# Patient Record
Sex: FEMALE | Race: WHITE | NOT HISPANIC OR LATINO | Employment: OTHER | URBAN - METROPOLITAN AREA
[De-identification: names, ages, dates, MRNs, and addresses within clinical notes are randomized per-mention and may not be internally consistent; named-entity substitution may affect disease eponyms.]

---

## 2022-01-01 ENCOUNTER — APPOINTMENT (OUTPATIENT)
Dept: CARDIOLOGY | Facility: CLINIC | Age: 87
DRG: 853 | End: 2022-01-01
Attending: SPECIALIST
Payer: MEDICARE

## 2022-01-01 ENCOUNTER — APPOINTMENT (OUTPATIENT)
Dept: GENERAL RADIOLOGY | Facility: CLINIC | Age: 87
DRG: 853 | End: 2022-01-01
Attending: EMERGENCY MEDICINE
Payer: MEDICARE

## 2022-01-01 ENCOUNTER — ANESTHESIA (OUTPATIENT)
Dept: SURGERY | Facility: CLINIC | Age: 87
DRG: 853 | End: 2022-01-01
Payer: MEDICARE

## 2022-01-01 ENCOUNTER — HOSPITAL ENCOUNTER (INPATIENT)
Facility: CLINIC | Age: 87
LOS: 14 days | DRG: 853 | End: 2022-05-08
Attending: EMERGENCY MEDICINE | Admitting: HOSPITALIST
Payer: MEDICARE

## 2022-01-01 ENCOUNTER — APPOINTMENT (OUTPATIENT)
Dept: GENERAL RADIOLOGY | Facility: CLINIC | Age: 87
DRG: 853 | End: 2022-01-01
Attending: NURSE PRACTITIONER
Payer: MEDICARE

## 2022-01-01 ENCOUNTER — ANESTHESIA EVENT (OUTPATIENT)
Dept: SURGERY | Facility: CLINIC | Age: 87
DRG: 853 | End: 2022-01-01
Payer: MEDICARE

## 2022-01-01 ENCOUNTER — APPOINTMENT (OUTPATIENT)
Dept: OCCUPATIONAL THERAPY | Facility: CLINIC | Age: 87
DRG: 853 | End: 2022-01-01
Payer: MEDICARE

## 2022-01-01 ENCOUNTER — APPOINTMENT (OUTPATIENT)
Dept: CT IMAGING | Facility: CLINIC | Age: 87
DRG: 853 | End: 2022-01-01
Attending: NURSE PRACTITIONER
Payer: MEDICARE

## 2022-01-01 ENCOUNTER — PREP FOR PROCEDURE (OUTPATIENT)
Dept: OTHER | Facility: CLINIC | Age: 87
End: 2022-01-01
Payer: MEDICARE

## 2022-01-01 ENCOUNTER — HOSPITAL ENCOUNTER (EMERGENCY)
Facility: CLINIC | Age: 87
Discharge: HOME | DRG: 853 | End: 2022-04-22
Attending: EMERGENCY MEDICINE | Admitting: EMERGENCY MEDICINE
Payer: MEDICARE

## 2022-01-01 ENCOUNTER — APPOINTMENT (OUTPATIENT)
Dept: CARDIOLOGY | Facility: CLINIC | Age: 87
DRG: 853 | End: 2022-01-01
Attending: PEDIATRICS
Payer: MEDICARE

## 2022-01-01 ENCOUNTER — APPOINTMENT (OUTPATIENT)
Dept: GENERAL RADIOLOGY | Facility: CLINIC | Age: 87
DRG: 853 | End: 2022-01-01
Attending: SPECIALIST
Payer: MEDICARE

## 2022-01-01 ENCOUNTER — APPOINTMENT (OUTPATIENT)
Dept: OCCUPATIONAL THERAPY | Facility: CLINIC | Age: 87
DRG: 853 | End: 2022-01-01
Attending: PHYSICIAN ASSISTANT
Payer: MEDICARE

## 2022-01-01 ENCOUNTER — APPOINTMENT (OUTPATIENT)
Dept: CARDIOLOGY | Facility: CLINIC | Age: 87
DRG: 853 | End: 2022-01-01
Attending: HOSPITALIST
Payer: MEDICARE

## 2022-01-01 ENCOUNTER — APPOINTMENT (OUTPATIENT)
Dept: CT IMAGING | Facility: CLINIC | Age: 87
DRG: 853 | End: 2022-01-01
Attending: EMERGENCY MEDICINE
Payer: MEDICARE

## 2022-01-01 ENCOUNTER — APPOINTMENT (OUTPATIENT)
Dept: ULTRASOUND IMAGING | Facility: CLINIC | Age: 87
DRG: 853 | End: 2022-01-01
Attending: HOSPITALIST
Payer: MEDICARE

## 2022-01-01 ENCOUNTER — PREP FOR PROCEDURE (OUTPATIENT)
Dept: OTHER | Facility: CLINIC | Age: 87
End: 2022-01-01

## 2022-01-01 VITALS
SYSTOLIC BLOOD PRESSURE: 119 MMHG | BODY MASS INDEX: 22.47 KG/M2 | DIASTOLIC BLOOD PRESSURE: 70 MMHG | HEART RATE: 100 BPM | WEIGHT: 143.2 LBS | RESPIRATION RATE: 24 BRPM | HEIGHT: 67 IN | TEMPERATURE: 98.5 F | OXYGEN SATURATION: 95 %

## 2022-01-01 VITALS
WEIGHT: 128 LBS | RESPIRATION RATE: 18 BRPM | DIASTOLIC BLOOD PRESSURE: 74 MMHG | HEIGHT: 61 IN | BODY MASS INDEX: 24.17 KG/M2 | SYSTOLIC BLOOD PRESSURE: 133 MMHG | OXYGEN SATURATION: 95 % | TEMPERATURE: 98.2 F | HEART RATE: 77 BPM

## 2022-01-01 DIAGNOSIS — N28.9 RENAL LESION: ICD-10-CM

## 2022-01-01 DIAGNOSIS — J98.11 ATELECTASIS: ICD-10-CM

## 2022-01-01 DIAGNOSIS — M00.9 JOINT INFECTION (H): Primary | ICD-10-CM

## 2022-01-01 DIAGNOSIS — M25.511 ACUTE PAIN OF RIGHT SHOULDER: ICD-10-CM

## 2022-01-01 DIAGNOSIS — M00.9 PYOGENIC ARTHRITIS OF RIGHT SHOULDER REGION, DUE TO UNSPECIFIED ORGANISM (H): Primary | ICD-10-CM

## 2022-01-01 DIAGNOSIS — M00.9 PYOGENIC ARTHRITIS OF RIGHT SHOULDER REGION, DUE TO UNSPECIFIED ORGANISM (H): ICD-10-CM

## 2022-01-01 LAB
ALBUMIN SERPL-MCNC: 1.4 G/DL (ref 3.4–5)
ALBUMIN SERPL-MCNC: 1.9 G/DL (ref 3.4–5)
ALBUMIN SERPL-MCNC: 2.2 G/DL (ref 3.4–5)
ALBUMIN UR-MCNC: 10 MG/DL
ALP SERPL-CCNC: 125 U/L (ref 40–150)
ALP SERPL-CCNC: 67 U/L (ref 40–150)
ALP SERPL-CCNC: 88 U/L (ref 40–150)
ALT SERPL W P-5'-P-CCNC: 30 U/L (ref 0–50)
ALT SERPL W P-5'-P-CCNC: 33 U/L (ref 0–50)
ALT SERPL W P-5'-P-CCNC: 38 U/L (ref 0–50)
ANION GAP SERPL CALCULATED.3IONS-SCNC: 10 MMOL/L (ref 3–14)
ANION GAP SERPL CALCULATED.3IONS-SCNC: 2 MMOL/L (ref 3–14)
ANION GAP SERPL CALCULATED.3IONS-SCNC: 4 MMOL/L (ref 3–14)
ANION GAP SERPL CALCULATED.3IONS-SCNC: 4 MMOL/L (ref 3–14)
ANION GAP SERPL CALCULATED.3IONS-SCNC: 5 MMOL/L (ref 3–14)
ANION GAP SERPL CALCULATED.3IONS-SCNC: 5 MMOL/L (ref 3–14)
ANION GAP SERPL CALCULATED.3IONS-SCNC: 6 MMOL/L (ref 3–14)
ANION GAP SERPL CALCULATED.3IONS-SCNC: 8 MMOL/L (ref 3–14)
APPEARANCE FLD: ABNORMAL
APPEARANCE UR: CLEAR
AST SERPL W P-5'-P-CCNC: 26 U/L (ref 0–45)
AST SERPL W P-5'-P-CCNC: 37 U/L (ref 0–45)
AST SERPL W P-5'-P-CCNC: 39 U/L (ref 0–45)
ATRIAL RATE - MUSE: 88 BPM
BACTERIA BLD CULT: ABNORMAL
BACTERIA BLD CULT: NO GROWTH
BACTERIA SNV CULT: ABNORMAL
BACTERIA SNV CULT: ABNORMAL
BACTERIA UR CULT: NO GROWTH
BASOPHILS # BLD AUTO: 0.1 10E3/UL (ref 0–0.2)
BASOPHILS # BLD MANUAL: 0 10E3/UL (ref 0–0.2)
BASOPHILS NFR BLD AUTO: 0 %
BASOPHILS NFR BLD MANUAL: 0 %
BILIRUB SERPL-MCNC: 0.2 MG/DL (ref 0.2–1.3)
BILIRUB SERPL-MCNC: 0.5 MG/DL (ref 0.2–1.3)
BILIRUB SERPL-MCNC: 0.6 MG/DL (ref 0.2–1.3)
BILIRUB UR QL STRIP: NEGATIVE
BUN SERPL-MCNC: 10 MG/DL (ref 7–30)
BUN SERPL-MCNC: 11 MG/DL (ref 7–30)
BUN SERPL-MCNC: 11 MG/DL (ref 7–30)
BUN SERPL-MCNC: 12 MG/DL (ref 7–30)
BUN SERPL-MCNC: 14 MG/DL (ref 7–30)
BUN SERPL-MCNC: 14 MG/DL (ref 7–30)
BUN SERPL-MCNC: 15 MG/DL (ref 7–30)
BUN SERPL-MCNC: 18 MG/DL (ref 7–30)
CALCIUM SERPL-MCNC: 6.8 MG/DL (ref 8.5–10.1)
CALCIUM SERPL-MCNC: 6.9 MG/DL (ref 8.5–10.1)
CALCIUM SERPL-MCNC: 7.7 MG/DL (ref 8.5–10.1)
CALCIUM SERPL-MCNC: 8 MG/DL (ref 8.5–10.1)
CALCIUM SERPL-MCNC: 8.1 MG/DL (ref 8.5–10.1)
CALCIUM SERPL-MCNC: 8.2 MG/DL (ref 8.5–10.1)
CELL COUNT BODY FLUID SOURCE: ABNORMAL
CHLORIDE BLD-SCNC: 105 MMOL/L (ref 94–109)
CHLORIDE BLD-SCNC: 107 MMOL/L (ref 94–109)
CHLORIDE BLD-SCNC: 108 MMOL/L (ref 94–109)
CHLORIDE BLD-SCNC: 108 MMOL/L (ref 94–109)
CHLORIDE BLD-SCNC: 109 MMOL/L (ref 94–109)
CHLORIDE BLD-SCNC: 109 MMOL/L (ref 94–109)
CHLORIDE BLD-SCNC: 110 MMOL/L (ref 94–109)
CHLORIDE BLD-SCNC: 111 MMOL/L (ref 94–109)
CO2 SERPL-SCNC: 20 MMOL/L (ref 20–32)
CO2 SERPL-SCNC: 20 MMOL/L (ref 20–32)
CO2 SERPL-SCNC: 21 MMOL/L (ref 20–32)
CO2 SERPL-SCNC: 25 MMOL/L (ref 20–32)
CO2 SERPL-SCNC: 28 MMOL/L (ref 20–32)
CO2 SERPL-SCNC: 29 MMOL/L (ref 20–32)
COLOR FLD: YELLOW
COLOR UR AUTO: ABNORMAL
CREAT BLD-MCNC: 0.8 MG/DL (ref 0.5–1)
CREAT SERPL-MCNC: 0.54 MG/DL (ref 0.52–1.04)
CREAT SERPL-MCNC: 0.61 MG/DL (ref 0.52–1.04)
CREAT SERPL-MCNC: 0.64 MG/DL (ref 0.52–1.04)
CREAT SERPL-MCNC: 0.68 MG/DL (ref 0.52–1.04)
CREAT SERPL-MCNC: 0.69 MG/DL (ref 0.52–1.04)
CREAT SERPL-MCNC: 0.78 MG/DL (ref 0.52–1.04)
CREAT SERPL-MCNC: 0.81 MG/DL (ref 0.52–1.04)
CREAT SERPL-MCNC: 0.82 MG/DL (ref 0.52–1.04)
CREAT SERPL-MCNC: 0.88 MG/DL (ref 0.52–1.04)
CRP SERPL-MCNC: 122 MG/L (ref 0–8)
CRP SERPL-MCNC: 130 MG/L (ref 0–8)
CRP SERPL-MCNC: 135 MG/L (ref 0–8)
CRP SERPL-MCNC: 147 MG/L (ref 0–8)
CRP SERPL-MCNC: 154 MG/L (ref 0–8)
CRP SERPL-MCNC: 168 MG/L (ref 0–8)
CRP SERPL-MCNC: 176 MG/L (ref 0–8)
CRP SERPL-MCNC: 185 MG/L (ref 0–8)
CRP SERPL-MCNC: 203 MG/L (ref 0–8)
CRP SERPL-MCNC: 235 MG/L (ref 0–8)
CRP SERPL-MCNC: 260 MG/L (ref 0–8)
CRP SERPL-MCNC: 296 MG/L (ref 0–8)
CRP SERPL-MCNC: 78.7 MG/L (ref 0–8)
CRYSTALS SNV MICRO: ABNORMAL
DIASTOLIC BLOOD PRESSURE - MUSE: NORMAL MMHG
ENTEROCOCCUS FAECALIS: NOT DETECTED
ENTEROCOCCUS FAECIUM: NOT DETECTED
EOSINOPHIL # BLD AUTO: 0 10E3/UL (ref 0–0.7)
EOSINOPHIL # BLD MANUAL: 0 10E3/UL (ref 0–0.7)
EOSINOPHIL NFR BLD AUTO: 0 %
EOSINOPHIL NFR BLD MANUAL: 0 %
ERYTHROCYTE [DISTWIDTH] IN BLOOD BY AUTOMATED COUNT: 15.1 % (ref 10–15)
ERYTHROCYTE [DISTWIDTH] IN BLOOD BY AUTOMATED COUNT: 15.4 % (ref 10–15)
ERYTHROCYTE [DISTWIDTH] IN BLOOD BY AUTOMATED COUNT: 15.4 % (ref 10–15)
ERYTHROCYTE [DISTWIDTH] IN BLOOD BY AUTOMATED COUNT: 15.5 % (ref 10–15)
ERYTHROCYTE [DISTWIDTH] IN BLOOD BY AUTOMATED COUNT: 15.5 % (ref 10–15)
ERYTHROCYTE [DISTWIDTH] IN BLOOD BY AUTOMATED COUNT: 15.6 % (ref 10–15)
ERYTHROCYTE [DISTWIDTH] IN BLOOD BY AUTOMATED COUNT: 15.6 % (ref 10–15)
ERYTHROCYTE [DISTWIDTH] IN BLOOD BY AUTOMATED COUNT: 15.7 % (ref 10–15)
ERYTHROCYTE [DISTWIDTH] IN BLOOD BY AUTOMATED COUNT: 15.8 % (ref 10–15)
ERYTHROCYTE [DISTWIDTH] IN BLOOD BY AUTOMATED COUNT: 15.9 % (ref 10–15)
GFR SERPL CREATININE-BSD FRML MDRD: 64 ML/MIN/1.73M2
GFR SERPL CREATININE-BSD FRML MDRD: 69 ML/MIN/1.73M2
GFR SERPL CREATININE-BSD FRML MDRD: 70 ML/MIN/1.73M2
GFR SERPL CREATININE-BSD FRML MDRD: 74 ML/MIN/1.73M2
GFR SERPL CREATININE-BSD FRML MDRD: 84 ML/MIN/1.73M2
GFR SERPL CREATININE-BSD FRML MDRD: 84 ML/MIN/1.73M2
GFR SERPL CREATININE-BSD FRML MDRD: 86 ML/MIN/1.73M2
GFR SERPL CREATININE-BSD FRML MDRD: 87 ML/MIN/1.73M2
GFR SERPL CREATININE-BSD FRML MDRD: 89 ML/MIN/1.73M2
GFR SERPL CREATININE-BSD FRML MDRD: >60 ML/MIN/1.73M2
GLUCOSE BLD-MCNC: 100 MG/DL (ref 70–99)
GLUCOSE BLD-MCNC: 110 MG/DL (ref 70–99)
GLUCOSE BLD-MCNC: 110 MG/DL (ref 70–99)
GLUCOSE BLD-MCNC: 112 MG/DL (ref 70–99)
GLUCOSE BLD-MCNC: 119 MG/DL (ref 70–99)
GLUCOSE BLD-MCNC: 148 MG/DL (ref 70–99)
GLUCOSE BLD-MCNC: 94 MG/DL (ref 70–99)
GLUCOSE BLD-MCNC: 98 MG/DL (ref 70–99)
GLUCOSE BLDC GLUCOMTR-MCNC: 107 MG/DL (ref 70–99)
GLUCOSE BLDC GLUCOMTR-MCNC: 96 MG/DL (ref 70–99)
GLUCOSE UR STRIP-MCNC: NEGATIVE MG/DL
GRAM STAIN RESULT: ABNORMAL
HCO3 BLDV-SCNC: 23 MMOL/L (ref 21–28)
HCT VFR BLD AUTO: 26.2 % (ref 35–47)
HCT VFR BLD AUTO: 27.2 % (ref 35–47)
HCT VFR BLD AUTO: 27.4 % (ref 35–47)
HCT VFR BLD AUTO: 27.7 % (ref 35–47)
HCT VFR BLD AUTO: 28.2 % (ref 35–47)
HCT VFR BLD AUTO: 28.3 % (ref 35–47)
HCT VFR BLD AUTO: 30.7 % (ref 35–47)
HCT VFR BLD AUTO: 33.2 % (ref 35–47)
HCT VFR BLD AUTO: 33.2 % (ref 35–47)
HCT VFR BLD AUTO: 37.8 % (ref 35–47)
HGB BLD-MCNC: 10 G/DL (ref 11.7–15.7)
HGB BLD-MCNC: 10.6 G/DL (ref 11.7–15.7)
HGB BLD-MCNC: 10.8 G/DL (ref 11.7–15.7)
HGB BLD-MCNC: 12.5 G/DL (ref 11.7–15.7)
HGB BLD-MCNC: 8.6 G/DL (ref 11.7–15.7)
HGB BLD-MCNC: 8.9 G/DL (ref 11.7–15.7)
HGB BLD-MCNC: 8.9 G/DL (ref 11.7–15.7)
HGB BLD-MCNC: 9 G/DL (ref 11.7–15.7)
HGB BLD-MCNC: 9.1 G/DL (ref 11.7–15.7)
HGB BLD-MCNC: 9.5 G/DL (ref 11.7–15.7)
HGB UR QL STRIP: ABNORMAL
IMM GRANULOCYTES # BLD: 0.2 10E3/UL
IMM GRANULOCYTES NFR BLD: 1 %
INTERPRETATION ECG - MUSE: NORMAL
KETONES UR STRIP-MCNC: NEGATIVE MG/DL
LACTATE BLD-SCNC: 2.1 MMOL/L
LACTATE SERPL-SCNC: 0.6 MMOL/L (ref 0.7–2)
LACTATE SERPL-SCNC: 1 MMOL/L (ref 0.7–2)
LACTATE SERPL-SCNC: 1.3 MMOL/L (ref 0.7–2)
LACTATE SERPL-SCNC: 2.4 MMOL/L (ref 0.7–2)
LEUKOCYTE ESTERASE UR QL STRIP: NEGATIVE
LISTERIA SPECIES (DETECTED/NOT DETECTED): NOT DETECTED
LVEF ECHO: NORMAL
LYMPHOCYTES # BLD AUTO: 1 10E3/UL (ref 0.8–5.3)
LYMPHOCYTES # BLD MANUAL: 0.7 10E3/UL (ref 0.8–5.3)
LYMPHOCYTES NFR BLD AUTO: 5 %
LYMPHOCYTES NFR BLD MANUAL: 4 %
LYMPHOCYTES NFR FLD MANUAL: NORMAL %
MAGNESIUM SERPL-MCNC: 1.8 MG/DL (ref 1.6–2.3)
MCH RBC QN AUTO: 30.4 PG (ref 26.5–33)
MCH RBC QN AUTO: 30.4 PG (ref 26.5–33)
MCH RBC QN AUTO: 30.5 PG (ref 26.5–33)
MCH RBC QN AUTO: 30.6 PG (ref 26.5–33)
MCH RBC QN AUTO: 30.7 PG (ref 26.5–33)
MCH RBC QN AUTO: 30.9 PG (ref 26.5–33)
MCH RBC QN AUTO: 31.2 PG (ref 26.5–33)
MCHC RBC AUTO-ENTMCNC: 31.6 G/DL (ref 31.5–36.5)
MCHC RBC AUTO-ENTMCNC: 31.9 G/DL (ref 31.5–36.5)
MCHC RBC AUTO-ENTMCNC: 32.5 G/DL (ref 31.5–36.5)
MCHC RBC AUTO-ENTMCNC: 32.5 G/DL (ref 31.5–36.5)
MCHC RBC AUTO-ENTMCNC: 32.6 G/DL (ref 31.5–36.5)
MCHC RBC AUTO-ENTMCNC: 32.7 G/DL (ref 31.5–36.5)
MCHC RBC AUTO-ENTMCNC: 32.8 G/DL (ref 31.5–36.5)
MCHC RBC AUTO-ENTMCNC: 33.1 G/DL (ref 31.5–36.5)
MCHC RBC AUTO-ENTMCNC: 33.2 G/DL (ref 31.5–36.5)
MCHC RBC AUTO-ENTMCNC: 33.6 G/DL (ref 31.5–36.5)
MCV RBC AUTO: 91 FL (ref 78–100)
MCV RBC AUTO: 92 FL (ref 78–100)
MCV RBC AUTO: 93 FL (ref 78–100)
MCV RBC AUTO: 93 FL (ref 78–100)
MCV RBC AUTO: 94 FL (ref 78–100)
MCV RBC AUTO: 95 FL (ref 78–100)
MCV RBC AUTO: 97 FL (ref 78–100)
MCV RBC AUTO: 97 FL (ref 78–100)
METAMYELOCYTES # BLD MANUAL: 0.2 10E3/UL
METAMYELOCYTES NFR BLD MANUAL: 1 %
MONOCYTES # BLD AUTO: 1.4 10E3/UL (ref 0–1.3)
MONOCYTES # BLD MANUAL: 0.2 10E3/UL (ref 0–1.3)
MONOCYTES NFR BLD AUTO: 6 %
MONOCYTES NFR BLD MANUAL: 1 %
MONOS+MACROS NFR FLD MANUAL: NORMAL %
MUCOUS THREADS #/AREA URNS LPF: PRESENT /LPF
NEUTROPHILS # BLD AUTO: 19.8 10E3/UL (ref 1.6–8.3)
NEUTROPHILS # BLD MANUAL: 17 10E3/UL (ref 1.6–8.3)
NEUTROPHILS NFR BLD AUTO: 88 %
NEUTROPHILS NFR BLD MANUAL: 94 %
NEUTS BAND NFR FLD MANUAL: NORMAL %
NITRATE UR QL: NEGATIVE
NRBC # BLD AUTO: 0 10E3/UL
NRBC # BLD AUTO: 0.2 10E3/UL
NRBC BLD AUTO-RTO: 0 /100
NRBC BLD MANUAL-RTO: 1 %
NT-PROBNP SERPL-MCNC: ABNORMAL PG/ML (ref 0–1800)
P AXIS - MUSE: 87 DEGREES
PCO2 BLDV: 49 MM HG (ref 40–50)
PH BLDV: 7.28 [PH] (ref 7.32–7.43)
PH UR STRIP: 6 [PH] (ref 5–7)
PLAT MORPH BLD: ABNORMAL
PLATELET # BLD AUTO: 239 10E3/UL (ref 150–450)
PLATELET # BLD AUTO: 244 10E3/UL (ref 150–450)
PLATELET # BLD AUTO: 246 10E3/UL (ref 150–450)
PLATELET # BLD AUTO: 257 10E3/UL (ref 150–450)
PLATELET # BLD AUTO: 260 10E3/UL (ref 150–450)
PLATELET # BLD AUTO: 290 10E3/UL (ref 150–450)
PLATELET # BLD AUTO: 298 10E3/UL (ref 150–450)
PLATELET # BLD AUTO: 340 10E3/UL (ref 150–450)
PLATELET # BLD AUTO: 359 10E3/UL (ref 150–450)
PLATELET # BLD AUTO: 418 10E3/UL (ref 150–450)
PO2 BLDV: 26 MM HG (ref 25–47)
POTASSIUM BLD-SCNC: 3.5 MMOL/L (ref 3.4–5.3)
POTASSIUM BLD-SCNC: 3.6 MMOL/L (ref 3.4–5.3)
POTASSIUM BLD-SCNC: 3.7 MMOL/L (ref 3.4–5.3)
POTASSIUM BLD-SCNC: 4 MMOL/L (ref 3.4–5.3)
POTASSIUM BLD-SCNC: 4.1 MMOL/L (ref 3.4–5.3)
POTASSIUM BLD-SCNC: 4.1 MMOL/L (ref 3.4–5.3)
POTASSIUM BLD-SCNC: 4.2 MMOL/L (ref 3.4–5.3)
POTASSIUM BLD-SCNC: 4.2 MMOL/L (ref 3.4–5.3)
PR INTERVAL - MUSE: 188 MS
PROCALCITONIN SERPL-MCNC: 1.72 NG/ML
PROT SERPL-MCNC: 4.5 G/DL (ref 6.8–8.8)
PROT SERPL-MCNC: 5.4 G/DL (ref 6.8–8.8)
PROT SERPL-MCNC: 6.2 G/DL (ref 6.8–8.8)
QRS DURATION - MUSE: 74 MS
QT - MUSE: 354 MS
QTC - MUSE: 428 MS
R AXIS - MUSE: 64 DEGREES
RBC # BLD AUTO: 2.81 10E6/UL (ref 3.8–5.2)
RBC # BLD AUTO: 2.91 10E6/UL (ref 3.8–5.2)
RBC # BLD AUTO: 2.91 10E6/UL (ref 3.8–5.2)
RBC # BLD AUTO: 2.93 10E6/UL (ref 3.8–5.2)
RBC # BLD AUTO: 2.97 10E6/UL (ref 3.8–5.2)
RBC # BLD AUTO: 3.12 10E6/UL (ref 3.8–5.2)
RBC # BLD AUTO: 3.29 10E6/UL (ref 3.8–5.2)
RBC # BLD AUTO: 3.43 10E6/UL (ref 3.8–5.2)
RBC # BLD AUTO: 3.54 10E6/UL (ref 3.8–5.2)
RBC # BLD AUTO: 4.01 10E6/UL (ref 3.8–5.2)
RBC MORPH BLD: ABNORMAL
RBC URINE: 1 /HPF
SAO2 % BLDV: 39 % (ref 94–100)
SARS-COV-2 RNA RESP QL NAA+PROBE: NEGATIVE
SARS-COV-2 RNA RESP QL NAA+PROBE: NEGATIVE
SODIUM SERPL-SCNC: 132 MMOL/L (ref 133–144)
SODIUM SERPL-SCNC: 136 MMOL/L (ref 133–144)
SODIUM SERPL-SCNC: 136 MMOL/L (ref 133–144)
SODIUM SERPL-SCNC: 138 MMOL/L (ref 133–144)
SODIUM SERPL-SCNC: 140 MMOL/L (ref 133–144)
SP GR UR STRIP: 1.02 (ref 1–1.03)
SQUAMOUS EPITHELIAL: <1 /HPF
STAPHYLOCOCCUS AUREUS: DETECTED
STAPHYLOCOCCUS EPIDERMIDIS: NOT DETECTED
STAPHYLOCOCCUS LUGDUNENSIS: NOT DETECTED
STREPTOCOCCUS AGALACTIAE: NOT DETECTED
STREPTOCOCCUS ANGINOSUS GROUP: NOT DETECTED
STREPTOCOCCUS PNEUMONIAE: NOT DETECTED
STREPTOCOCCUS PYOGENES: NOT DETECTED
STREPTOCOCCUS SPECIES: NOT DETECTED
SYSTOLIC BLOOD PRESSURE - MUSE: NORMAL MMHG
T AXIS - MUSE: 71 DEGREES
TROPONIN I SERPL HS-MCNC: 147 NG/L
TROPONIN I SERPL HS-MCNC: 204 NG/L
TROPONIN I SERPL HS-MCNC: 22 NG/L
TSH SERPL DL<=0.005 MIU/L-ACNC: 1.22 MU/L (ref 0.4–4)
UROBILINOGEN UR STRIP-MCNC: NORMAL MG/DL
VENTRICULAR RATE- MUSE: 88 BPM
WBC # BLD AUTO: 15.6 10E3/UL (ref 4–11)
WBC # BLD AUTO: 16 10E3/UL (ref 4–11)
WBC # BLD AUTO: 16.9 10E3/UL (ref 4–11)
WBC # BLD AUTO: 17.2 10E3/UL (ref 4–11)
WBC # BLD AUTO: 18.1 10E3/UL (ref 4–11)
WBC # BLD AUTO: 18.2 10E3/UL (ref 4–11)
WBC # BLD AUTO: 18.3 10E3/UL (ref 4–11)
WBC # BLD AUTO: 19.3 10E3/UL (ref 4–11)
WBC # BLD AUTO: 21.5 10E3/UL (ref 4–11)
WBC # BLD AUTO: 22.4 10E3/UL (ref 4–11)
WBC URINE: 2 /HPF

## 2022-01-01 PROCEDURE — 97535 SELF CARE MNGMENT TRAINING: CPT | Mod: GO | Performed by: OCCUPATIONAL THERAPIST

## 2022-01-01 PROCEDURE — 99356 PR PROLONGED SERV,INPATIENT,1ST HR: CPT | Performed by: REGISTERED NURSE

## 2022-01-01 PROCEDURE — 97110 THERAPEUTIC EXERCISES: CPT | Mod: GO | Performed by: OCCUPATIONAL THERAPIST

## 2022-01-01 PROCEDURE — 250N000009 HC RX 250: Performed by: INTERNAL MEDICINE

## 2022-01-01 PROCEDURE — 93325 DOPPLER ECHO COLOR FLOW MAPG: CPT | Mod: 26 | Performed by: INTERNAL MEDICINE

## 2022-01-01 PROCEDURE — 250N000009 HC RX 250: Performed by: HOSPITALIST

## 2022-01-01 PROCEDURE — 36415 COLL VENOUS BLD VENIPUNCTURE: CPT | Performed by: HOSPITALIST

## 2022-01-01 PROCEDURE — 250N000025 HC SEVOFLURANE, PER MIN: Performed by: ORTHOPAEDIC SURGERY

## 2022-01-01 PROCEDURE — 99222 1ST HOSP IP/OBS MODERATE 55: CPT | Performed by: PHYSICIAN ASSISTANT

## 2022-01-01 PROCEDURE — 250N000013 HC RX MED GY IP 250 OP 250 PS 637: Performed by: INTERNAL MEDICINE

## 2022-01-01 PROCEDURE — 250N000013 HC RX MED GY IP 250 OP 250 PS 637: Performed by: EMERGENCY MEDICINE

## 2022-01-01 PROCEDURE — 36415 COLL VENOUS BLD VENIPUNCTURE: CPT | Performed by: SPECIALIST

## 2022-01-01 PROCEDURE — 93312 ECHO TRANSESOPHAGEAL: CPT | Mod: 26 | Performed by: INTERNAL MEDICINE

## 2022-01-01 PROCEDURE — 250N000011 HC RX IP 250 OP 636: Performed by: ORTHOPAEDIC SURGERY

## 2022-01-01 PROCEDURE — 99233 SBSQ HOSP IP/OBS HIGH 50: CPT | Performed by: HOSPITALIST

## 2022-01-01 PROCEDURE — 99233 SBSQ HOSP IP/OBS HIGH 50: CPT | Performed by: INTERNAL MEDICINE

## 2022-01-01 PROCEDURE — 83605 ASSAY OF LACTIC ACID: CPT | Performed by: HOSPITALIST

## 2022-01-01 PROCEDURE — 86140 C-REACTIVE PROTEIN: CPT | Performed by: PHYSICIAN ASSISTANT

## 2022-01-01 PROCEDURE — 250N000011 HC RX IP 250 OP 636: Performed by: SPECIALIST

## 2022-01-01 PROCEDURE — 93325 DOPPLER ECHO COLOR FLOW MAPG: CPT

## 2022-01-01 PROCEDURE — 96366 THER/PROPH/DIAG IV INF ADDON: CPT

## 2022-01-01 PROCEDURE — 93005 ELECTROCARDIOGRAM TRACING: CPT

## 2022-01-01 PROCEDURE — 83605 ASSAY OF LACTIC ACID: CPT | Performed by: PEDIATRICS

## 2022-01-01 PROCEDURE — 20610 DRAIN/INJ JOINT/BURSA W/O US: CPT

## 2022-01-01 PROCEDURE — 258N000001 HC RX 258: Performed by: ORTHOPAEDIC SURGERY

## 2022-01-01 PROCEDURE — 250N000013 HC RX MED GY IP 250 OP 250 PS 637: Performed by: HOSPITALIST

## 2022-01-01 PROCEDURE — 999N000157 HC STATISTIC RCP TIME EA 10 MIN

## 2022-01-01 PROCEDURE — 250N000009 HC RX 250: Performed by: ANESTHESIOLOGY

## 2022-01-01 PROCEDURE — 36415 COLL VENOUS BLD VENIPUNCTURE: CPT | Performed by: PHYSICIAN ASSISTANT

## 2022-01-01 PROCEDURE — 87075 CULTR BACTERIA EXCEPT BLOOD: CPT | Performed by: ORTHOPAEDIC SURGERY

## 2022-01-01 PROCEDURE — 250N000011 HC RX IP 250 OP 636: Performed by: NURSE PRACTITIONER

## 2022-01-01 PROCEDURE — 250N000013 HC RX MED GY IP 250 OP 250 PS 637: Performed by: PHYSICIAN ASSISTANT

## 2022-01-01 PROCEDURE — 999N000183 HC STATISTIC TEE INCLUDES SEDATION

## 2022-01-01 PROCEDURE — 250N000011 HC RX IP 250 OP 636: Performed by: HOSPITALIST

## 2022-01-01 PROCEDURE — 82803 BLOOD GASES ANY COMBINATION: CPT

## 2022-01-01 PROCEDURE — 83605 ASSAY OF LACTIC ACID: CPT | Performed by: NURSE PRACTITIONER

## 2022-01-01 PROCEDURE — 250N000011 HC RX IP 250 OP 636: Performed by: NURSE ANESTHETIST, CERTIFIED REGISTERED

## 2022-01-01 PROCEDURE — 96367 TX/PROPH/DG ADDL SEQ IV INF: CPT

## 2022-01-01 PROCEDURE — 71045 X-RAY EXAM CHEST 1 VIEW: CPT

## 2022-01-01 PROCEDURE — 76770 US EXAM ABDO BACK WALL COMP: CPT

## 2022-01-01 PROCEDURE — 80048 BASIC METABOLIC PNL TOTAL CA: CPT | Performed by: NURSE PRACTITIONER

## 2022-01-01 PROCEDURE — 250N000011 HC RX IP 250 OP 636: Performed by: PHYSICIAN ASSISTANT

## 2022-01-01 PROCEDURE — 85027 COMPLETE CBC AUTOMATED: CPT | Performed by: HOSPITALIST

## 2022-01-01 PROCEDURE — 999N000184 HC STATISTIC TELEMETRY

## 2022-01-01 PROCEDURE — 93320 DOPPLER ECHO COMPLETE: CPT | Mod: 26 | Performed by: INTERNAL MEDICINE

## 2022-01-01 PROCEDURE — 210N000002 HC R&B HEART CARE

## 2022-01-01 PROCEDURE — 999N000141 HC STATISTIC PRE-PROCEDURE NURSING ASSESSMENT: Performed by: ORTHOPAEDIC SURGERY

## 2022-01-01 PROCEDURE — 36415 COLL VENOUS BLD VENIPUNCTURE: CPT | Performed by: NURSE PRACTITIONER

## 2022-01-01 PROCEDURE — 272N000001 HC OR GENERAL SUPPLY STERILE: Performed by: ORTHOPAEDIC SURGERY

## 2022-01-01 PROCEDURE — 258N000003 HC RX IP 258 OP 636

## 2022-01-01 PROCEDURE — 94640 AIRWAY INHALATION TREATMENT: CPT

## 2022-01-01 PROCEDURE — 120N000001 HC R&B MED SURG/OB

## 2022-01-01 PROCEDURE — U0005 INFEC AGEN DETEC AMPLI PROBE: HCPCS | Performed by: HOSPITALIST

## 2022-01-01 PROCEDURE — 87149 DNA/RNA DIRECT PROBE: CPT | Performed by: EMERGENCY MEDICINE

## 2022-01-01 PROCEDURE — 99223 1ST HOSP IP/OBS HIGH 75: CPT | Performed by: REGISTERED NURSE

## 2022-01-01 PROCEDURE — 87040 BLOOD CULTURE FOR BACTERIA: CPT | Performed by: HOSPITALIST

## 2022-01-01 PROCEDURE — 84484 ASSAY OF TROPONIN QUANT: CPT | Performed by: EMERGENCY MEDICINE

## 2022-01-01 PROCEDURE — 87077 CULTURE AEROBIC IDENTIFY: CPT | Performed by: EMERGENCY MEDICINE

## 2022-01-01 PROCEDURE — 93308 TTE F-UP OR LMTD: CPT | Mod: 26 | Performed by: INTERNAL MEDICINE

## 2022-01-01 PROCEDURE — 250N000011 HC RX IP 250 OP 636: Performed by: EMERGENCY MEDICINE

## 2022-01-01 PROCEDURE — 99233 SBSQ HOSP IP/OBS HIGH 50: CPT | Performed by: REGISTERED NURSE

## 2022-01-01 PROCEDURE — 99232 SBSQ HOSP IP/OBS MODERATE 35: CPT | Performed by: INTERNAL MEDICINE

## 2022-01-01 PROCEDURE — 93306 TTE W/DOPPLER COMPLETE: CPT | Mod: 26 | Performed by: INTERNAL MEDICINE

## 2022-01-01 PROCEDURE — 370N000017 HC ANESTHESIA TECHNICAL FEE, PER MIN: Performed by: ORTHOPAEDIC SURGERY

## 2022-01-01 PROCEDURE — 96365 THER/PROPH/DIAG IV INF INIT: CPT

## 2022-01-01 PROCEDURE — 360N000075 HC SURGERY LEVEL 2, PER MIN: Performed by: ORTHOPAEDIC SURGERY

## 2022-01-01 PROCEDURE — 99358 PROLONG SERVICE W/O CONTACT: CPT | Performed by: REGISTERED NURSE

## 2022-01-01 PROCEDURE — C9113 INJ PANTOPRAZOLE SODIUM, VIA: HCPCS | Performed by: HOSPITALIST

## 2022-01-01 PROCEDURE — 258N000003 HC RX IP 258 OP 636: Performed by: HOSPITALIST

## 2022-01-01 PROCEDURE — 250N000013 HC RX MED GY IP 250 OP 250 PS 637: Performed by: REGISTERED NURSE

## 2022-01-01 PROCEDURE — 87077 CULTURE AEROBIC IDENTIFY: CPT | Performed by: SPECIALIST

## 2022-01-01 PROCEDURE — 99232 SBSQ HOSP IP/OBS MODERATE 35: CPT | Performed by: HOSPITALIST

## 2022-01-01 PROCEDURE — 250N000009 HC RX 250: Performed by: NURSE ANESTHETIST, CERTIFIED REGISTERED

## 2022-01-01 PROCEDURE — 83735 ASSAY OF MAGNESIUM: CPT | Performed by: NURSE PRACTITIONER

## 2022-01-01 PROCEDURE — 97530 THERAPEUTIC ACTIVITIES: CPT | Mod: GO

## 2022-01-01 PROCEDURE — 258N000003 HC RX IP 258 OP 636: Performed by: ANESTHESIOLOGY

## 2022-01-01 PROCEDURE — 99291 CRITICAL CARE FIRST HOUR: CPT | Performed by: NURSE PRACTITIONER

## 2022-01-01 PROCEDURE — 84443 ASSAY THYROID STIM HORMONE: CPT | Performed by: INTERNAL MEDICINE

## 2022-01-01 PROCEDURE — 99283 EMERGENCY DEPT VISIT LOW MDM: CPT

## 2022-01-01 PROCEDURE — 710N000011 HC RECOVERY PHASE 1, LEVEL 3, PER MIN: Performed by: ORTHOPAEDIC SURGERY

## 2022-01-01 PROCEDURE — 84145 PROCALCITONIN (PCT): CPT | Performed by: HOSPITALIST

## 2022-01-01 PROCEDURE — 97535 SELF CARE MNGMENT TRAINING: CPT | Mod: GO

## 2022-01-01 PROCEDURE — 96375 TX/PRO/DX INJ NEW DRUG ADDON: CPT

## 2022-01-01 PROCEDURE — 250N000011 HC RX IP 250 OP 636: Performed by: ANESTHESIOLOGY

## 2022-01-01 PROCEDURE — 250N000009 HC RX 250: Performed by: EMERGENCY MEDICINE

## 2022-01-01 PROCEDURE — 93010 ELECTROCARDIOGRAM REPORT: CPT | Performed by: INTERNAL MEDICINE

## 2022-01-01 PROCEDURE — 86140 C-REACTIVE PROTEIN: CPT | Performed by: HOSPITALIST

## 2022-01-01 PROCEDURE — 250N000011 HC RX IP 250 OP 636: Performed by: INTERNAL MEDICINE

## 2022-01-01 PROCEDURE — 87086 URINE CULTURE/COLONY COUNT: CPT | Performed by: EMERGENCY MEDICINE

## 2022-01-01 PROCEDURE — 81001 URINALYSIS AUTO W/SCOPE: CPT | Performed by: EMERGENCY MEDICINE

## 2022-01-01 PROCEDURE — 200N000001 HC R&B ICU

## 2022-01-01 PROCEDURE — 87205 SMEAR GRAM STAIN: CPT | Performed by: ORTHOPAEDIC SURGERY

## 2022-01-01 PROCEDURE — 83880 ASSAY OF NATRIURETIC PEPTIDE: CPT | Performed by: HOSPITALIST

## 2022-01-01 PROCEDURE — 87077 CULTURE AEROBIC IDENTIFY: CPT | Performed by: HOSPITALIST

## 2022-01-01 PROCEDURE — 82565 ASSAY OF CREATININE: CPT | Performed by: HOSPITALIST

## 2022-01-01 PROCEDURE — 0RBJ4ZZ EXCISION OF RIGHT SHOULDER JOINT, PERCUTANEOUS ENDOSCOPIC APPROACH: ICD-10-PCS | Performed by: ORTHOPAEDIC SURGERY

## 2022-01-01 PROCEDURE — 99223 1ST HOSP IP/OBS HIGH 75: CPT | Mod: AI | Performed by: HOSPITALIST

## 2022-01-01 PROCEDURE — 85014 HEMATOCRIT: CPT | Performed by: HOSPITALIST

## 2022-01-01 PROCEDURE — 97166 OT EVAL MOD COMPLEX 45 MIN: CPT | Mod: GO | Performed by: OCCUPATIONAL THERAPIST

## 2022-01-01 PROCEDURE — 258N000003 HC RX IP 258 OP 636: Performed by: PHYSICIAN ASSISTANT

## 2022-01-01 PROCEDURE — 99221 1ST HOSP IP/OBS SF/LOW 40: CPT | Performed by: STUDENT IN AN ORGANIZED HEALTH CARE EDUCATION/TRAINING PROGRAM

## 2022-01-01 PROCEDURE — 250N000009 HC RX 250

## 2022-01-01 PROCEDURE — 80053 COMPREHEN METABOLIC PANEL: CPT | Performed by: EMERGENCY MEDICINE

## 2022-01-01 PROCEDURE — 93010 ELECTROCARDIOGRAM REPORT: CPT | Mod: 76 | Performed by: INTERNAL MEDICINE

## 2022-01-01 PROCEDURE — 85027 COMPLETE CBC AUTOMATED: CPT | Performed by: NURSE PRACTITIONER

## 2022-01-01 PROCEDURE — 80048 BASIC METABOLIC PNL TOTAL CA: CPT | Performed by: HOSPITALIST

## 2022-01-01 PROCEDURE — 85025 COMPLETE CBC W/AUTO DIFF WBC: CPT | Performed by: EMERGENCY MEDICINE

## 2022-01-01 PROCEDURE — 85014 HEMATOCRIT: CPT | Performed by: PEDIATRICS

## 2022-01-01 PROCEDURE — 73030 X-RAY EXAM OF SHOULDER: CPT | Mod: RT

## 2022-01-01 PROCEDURE — 93321 DOPPLER ECHO F-UP/LMTD STD: CPT | Mod: 26 | Performed by: INTERNAL MEDICINE

## 2022-01-01 PROCEDURE — 74177 CT ABD & PELVIS W/CONTRAST: CPT | Mod: ME

## 2022-01-01 PROCEDURE — G1004 CDSM NDSC: HCPCS

## 2022-01-01 PROCEDURE — 99291 CRITICAL CARE FIRST HOUR: CPT | Performed by: PEDIATRICS

## 2022-01-01 PROCEDURE — 93306 TTE W/DOPPLER COMPLETE: CPT

## 2022-01-01 PROCEDURE — 99285 EMERGENCY DEPT VISIT HI MDM: CPT | Mod: 25

## 2022-01-01 PROCEDURE — 250N000011 HC RX IP 250 OP 636

## 2022-01-01 PROCEDURE — 82565 ASSAY OF CREATININE: CPT

## 2022-01-01 PROCEDURE — 258N000003 HC RX IP 258 OP 636: Performed by: INTERNAL MEDICINE

## 2022-01-01 PROCEDURE — C9803 HOPD COVID-19 SPEC COLLECT: HCPCS

## 2022-01-01 PROCEDURE — 360N000077 HC SURGERY LEVEL 4, PER MIN: Performed by: ORTHOPAEDIC SURGERY

## 2022-01-01 PROCEDURE — 999N000128 HC STATISTIC PERIPHERAL IV START W/O US GUIDANCE

## 2022-01-01 PROCEDURE — 84484 ASSAY OF TROPONIN QUANT: CPT | Performed by: INTERNAL MEDICINE

## 2022-01-01 PROCEDURE — 87070 CULTURE OTHR SPECIMN AEROBIC: CPT | Performed by: ORTHOPAEDIC SURGERY

## 2022-01-01 PROCEDURE — 80053 COMPREHEN METABOLIC PANEL: CPT | Performed by: INTERNAL MEDICINE

## 2022-01-01 PROCEDURE — 87070 CULTURE OTHR SPECIMN AEROBIC: CPT | Performed by: EMERGENCY MEDICINE

## 2022-01-01 PROCEDURE — 258N000003 HC RX IP 258 OP 636: Performed by: EMERGENCY MEDICINE

## 2022-01-01 PROCEDURE — 87040 BLOOD CULTURE FOR BACTERIA: CPT | Performed by: SPECIALIST

## 2022-01-01 PROCEDURE — 89060 EXAM SYNOVIAL FLUID CRYSTALS: CPT | Mod: TC | Performed by: EMERGENCY MEDICINE

## 2022-01-01 PROCEDURE — 80053 COMPREHEN METABOLIC PANEL: CPT | Performed by: HOSPITALIST

## 2022-01-01 PROCEDURE — 83605 ASSAY OF LACTIC ACID: CPT | Performed by: EMERGENCY MEDICINE

## 2022-01-01 PROCEDURE — 96376 TX/PRO/DX INJ SAME DRUG ADON: CPT

## 2022-01-01 PROCEDURE — 87205 SMEAR GRAM STAIN: CPT | Performed by: EMERGENCY MEDICINE

## 2022-01-01 PROCEDURE — U0005 INFEC AGEN DETEC AMPLI PROBE: HCPCS | Performed by: EMERGENCY MEDICINE

## 2022-01-01 PROCEDURE — 89050 BODY FLUID CELL COUNT: CPT | Performed by: EMERGENCY MEDICINE

## 2022-01-01 PROCEDURE — 99291 CRITICAL CARE FIRST HOUR: CPT | Performed by: INTERNAL MEDICINE

## 2022-01-01 PROCEDURE — 96368 THER/DIAG CONCURRENT INF: CPT

## 2022-01-01 PROCEDURE — 710N000009 HC RECOVERY PHASE 1, LEVEL 1, PER MIN: Performed by: ORTHOPAEDIC SURGERY

## 2022-01-01 PROCEDURE — 0R9J3ZX DRAINAGE OF RIGHT SHOULDER JOINT, PERCUTANEOUS APPROACH, DIAGNOSTIC: ICD-10-PCS | Performed by: EMERGENCY MEDICINE

## 2022-01-01 PROCEDURE — 36415 COLL VENOUS BLD VENIPUNCTURE: CPT | Performed by: EMERGENCY MEDICINE

## 2022-01-01 PROCEDURE — 84484 ASSAY OF TROPONIN QUANT: CPT | Performed by: NURSE PRACTITIONER

## 2022-01-01 PROCEDURE — 36415 COLL VENOUS BLD VENIPUNCTURE: CPT | Performed by: PEDIATRICS

## 2022-01-01 PROCEDURE — 99207 PR SC NO CHARGE VISIT: CPT | Performed by: PHYSICIAN ASSISTANT

## 2022-01-01 PROCEDURE — 96361 HYDRATE IV INFUSION ADD-ON: CPT

## 2022-01-01 PROCEDURE — 250N000009 HC RX 250: Performed by: ORTHOPAEDIC SURGERY

## 2022-01-01 RX ORDER — MORPHINE SULFATE 10 MG/5ML
5-10 SOLUTION ORAL
Status: DISCONTINUED | OUTPATIENT
Start: 2022-01-01 | End: 2022-01-01 | Stop reason: HOSPADM

## 2022-01-01 RX ORDER — OXYCODONE AND ACETAMINOPHEN 5; 325 MG/1; MG/1
1 TABLET ORAL EVERY 6 HOURS PRN
Qty: 25 TABLET | Refills: 0 | Status: SHIPPED | OUTPATIENT
Start: 2022-01-01

## 2022-01-01 RX ORDER — SODIUM CHLORIDE, SODIUM LACTATE, POTASSIUM CHLORIDE, CALCIUM CHLORIDE 600; 310; 30; 20 MG/100ML; MG/100ML; MG/100ML; MG/100ML
INJECTION, SOLUTION INTRAVENOUS CONTINUOUS
Status: DISCONTINUED | OUTPATIENT
Start: 2022-01-01 | End: 2022-01-01

## 2022-01-01 RX ORDER — DILTIAZEM HYDROCHLORIDE 5 MG/ML
10 INJECTION INTRAVENOUS ONCE
Status: DISCONTINUED | OUTPATIENT
Start: 2022-01-01 | End: 2022-01-01

## 2022-01-01 RX ORDER — NALOXONE HYDROCHLORIDE 0.4 MG/ML
0.2 INJECTION, SOLUTION INTRAMUSCULAR; INTRAVENOUS; SUBCUTANEOUS
Status: DISCONTINUED | OUTPATIENT
Start: 2022-01-01 | End: 2022-01-01 | Stop reason: HOSPADM

## 2022-01-01 RX ORDER — AMIODARONE HYDROCHLORIDE 200 MG/1
200 TABLET ORAL 2 TIMES DAILY
Status: DISCONTINUED | OUTPATIENT
Start: 2022-01-01 | End: 2022-01-01 | Stop reason: CLARIF

## 2022-01-01 RX ORDER — CYCLOBENZAPRINE HCL 10 MG
10 TABLET ORAL EVERY 8 HOURS PRN
Status: DISCONTINUED | OUTPATIENT
Start: 2022-01-01 | End: 2022-01-01

## 2022-01-01 RX ORDER — NALOXONE HYDROCHLORIDE 0.4 MG/ML
0.2 INJECTION, SOLUTION INTRAMUSCULAR; INTRAVENOUS; SUBCUTANEOUS
Status: DISCONTINUED | OUTPATIENT
Start: 2022-01-01 | End: 2022-01-01

## 2022-01-01 RX ORDER — HYDRALAZINE HYDROCHLORIDE 20 MG/ML
2.5-5 INJECTION INTRAMUSCULAR; INTRAVENOUS EVERY 10 MIN PRN
Status: DISCONTINUED | OUTPATIENT
Start: 2022-01-01 | End: 2022-01-01 | Stop reason: HOSPADM

## 2022-01-01 RX ORDER — HYDROMORPHONE HCL IN WATER/PF 6 MG/30 ML
0.2 PATIENT CONTROLLED ANALGESIA SYRINGE INTRAVENOUS EVERY 5 MIN PRN
Status: DISCONTINUED | OUTPATIENT
Start: 2022-01-01 | End: 2022-01-01 | Stop reason: HOSPADM

## 2022-01-01 RX ORDER — NALOXONE HYDROCHLORIDE 0.4 MG/ML
0.4 INJECTION, SOLUTION INTRAMUSCULAR; INTRAVENOUS; SUBCUTANEOUS
Status: DISCONTINUED | OUTPATIENT
Start: 2022-01-01 | End: 2022-01-01

## 2022-01-01 RX ORDER — LIDOCAINE HYDROCHLORIDE 20 MG/ML
INJECTION, SOLUTION INFILTRATION; PERINEURAL PRN
Status: DISCONTINUED | OUTPATIENT
Start: 2022-01-01 | End: 2022-01-01

## 2022-01-01 RX ORDER — CEFAZOLIN SODIUM/WATER 2 G/20 ML
2 SYRINGE (ML) INTRAVENOUS
Status: COMPLETED | OUTPATIENT
Start: 2022-01-01 | End: 2022-01-01

## 2022-01-01 RX ORDER — SODIUM CHLORIDE 9 MG/ML
INJECTION, SOLUTION INTRAVENOUS CONTINUOUS
Status: DISCONTINUED | OUTPATIENT
Start: 2022-01-01 | End: 2022-01-01

## 2022-01-01 RX ORDER — LIDOCAINE 50 MG/G
OINTMENT TOPICAL ONCE
Status: COMPLETED | OUTPATIENT
Start: 2022-01-01 | End: 2022-01-01

## 2022-01-01 RX ORDER — LORAZEPAM 2 MG/ML
0.25 CONCENTRATE ORAL EVERY 4 HOURS PRN
Status: DISCONTINUED | OUTPATIENT
Start: 2022-01-01 | End: 2022-01-01

## 2022-01-01 RX ORDER — SODIUM CHLORIDE, SODIUM LACTATE, POTASSIUM CHLORIDE, CALCIUM CHLORIDE 600; 310; 30; 20 MG/100ML; MG/100ML; MG/100ML; MG/100ML
INJECTION, SOLUTION INTRAVENOUS CONTINUOUS
Status: DISCONTINUED | OUTPATIENT
Start: 2022-01-01 | End: 2022-01-01 | Stop reason: HOSPADM

## 2022-01-01 RX ORDER — NALOXONE HYDROCHLORIDE 0.4 MG/ML
0.4 INJECTION, SOLUTION INTRAMUSCULAR; INTRAVENOUS; SUBCUTANEOUS
Status: DISCONTINUED | OUTPATIENT
Start: 2022-01-01 | End: 2022-01-01 | Stop reason: HOSPADM

## 2022-01-01 RX ORDER — HYDROMORPHONE HCL IN WATER/PF 6 MG/30 ML
0.4 PATIENT CONTROLLED ANALGESIA SYRINGE INTRAVENOUS ONCE
Status: COMPLETED | OUTPATIENT
Start: 2022-01-01 | End: 2022-01-01

## 2022-01-01 RX ORDER — ROSUVASTATIN CALCIUM 20 MG/1
20 TABLET, COATED ORAL DAILY
COMMUNITY
Start: 2021-01-01

## 2022-01-01 RX ORDER — OXYCODONE HYDROCHLORIDE 5 MG/1
5 TABLET ORAL EVERY 6 HOURS PRN
Status: DISCONTINUED | OUTPATIENT
Start: 2022-01-01 | End: 2022-01-01 | Stop reason: ALTCHOICE

## 2022-01-01 RX ORDER — POLYETHYLENE GLYCOL 3350 17 G/17G
17 POWDER, FOR SOLUTION ORAL DAILY PRN
Status: DISCONTINUED | OUTPATIENT
Start: 2022-01-01 | End: 2022-01-01 | Stop reason: HOSPADM

## 2022-01-01 RX ORDER — CEFAZOLIN SODIUM/WATER 2 G/20 ML
2 SYRINGE (ML) INTRAVENOUS SEE ADMIN INSTRUCTIONS
Status: DISCONTINUED | OUTPATIENT
Start: 2022-01-01 | End: 2022-01-01 | Stop reason: HOSPADM

## 2022-01-01 RX ORDER — METOPROLOL TARTRATE 1 MG/ML
5 INJECTION, SOLUTION INTRAVENOUS EVERY 4 HOURS PRN
Status: DISCONTINUED | OUTPATIENT
Start: 2022-01-01 | End: 2022-01-01

## 2022-01-01 RX ORDER — FUROSEMIDE 10 MG/ML
40 INJECTION INTRAMUSCULAR; INTRAVENOUS ONCE
Status: COMPLETED | OUTPATIENT
Start: 2022-01-01 | End: 2022-01-01

## 2022-01-01 RX ORDER — LIDOCAINE 40 MG/G
CREAM TOPICAL
Status: DISCONTINUED | OUTPATIENT
Start: 2022-01-01 | End: 2022-01-01 | Stop reason: HOSPADM

## 2022-01-01 RX ORDER — FUROSEMIDE 10 MG/ML
20 INJECTION INTRAMUSCULAR; INTRAVENOUS ONCE
Status: COMPLETED | OUTPATIENT
Start: 2022-01-01 | End: 2022-01-01

## 2022-01-01 RX ORDER — ONDANSETRON 2 MG/ML
INJECTION INTRAMUSCULAR; INTRAVENOUS PRN
Status: DISCONTINUED | OUTPATIENT
Start: 2022-01-01 | End: 2022-01-01

## 2022-01-01 RX ORDER — LIDOCAINE 40 MG/G
CREAM TOPICAL
Status: DISCONTINUED | OUTPATIENT
Start: 2022-01-01 | End: 2022-01-01

## 2022-01-01 RX ORDER — POLYETHYLENE GLYCOL 3350 17 G/17G
17 POWDER, FOR SOLUTION ORAL DAILY
Status: DISCONTINUED | OUTPATIENT
Start: 2022-01-01 | End: 2022-01-01

## 2022-01-01 RX ORDER — METOPROLOL TARTRATE 1 MG/ML
5 INJECTION, SOLUTION INTRAVENOUS EVERY 4 HOURS
Status: DISCONTINUED | OUTPATIENT
Start: 2022-01-01 | End: 2022-01-01 | Stop reason: ALTCHOICE

## 2022-01-01 RX ORDER — ACETAMINOPHEN 325 MG/1
975 TABLET ORAL EVERY 8 HOURS
Status: DISPENSED | OUTPATIENT
Start: 2022-01-01 | End: 2022-01-01

## 2022-01-01 RX ORDER — ATROPINE SULFATE 10 MG/ML
2-4 SOLUTION/ DROPS OPHTHALMIC
Status: DISCONTINUED | OUTPATIENT
Start: 2022-01-01 | End: 2022-01-01 | Stop reason: HOSPADM

## 2022-01-01 RX ORDER — OXYCODONE AND ACETAMINOPHEN 5; 325 MG/1; MG/1
.5-1 TABLET ORAL EVERY 6 HOURS PRN
Qty: 6 TABLET | Refills: 0 | Status: ON HOLD | OUTPATIENT
Start: 2022-01-01 | End: 2022-01-01

## 2022-01-01 RX ORDER — FENTANYL CITRATE 0.05 MG/ML
25 INJECTION, SOLUTION INTRAMUSCULAR; INTRAVENOUS EVERY 5 MIN PRN
Status: DISCONTINUED | OUTPATIENT
Start: 2022-01-01 | End: 2022-01-01 | Stop reason: HOSPADM

## 2022-01-01 RX ORDER — OXYCODONE AND ACETAMINOPHEN 5; 325 MG/1; MG/1
1 TABLET ORAL EVERY 6 HOURS PRN
Status: DISCONTINUED | OUTPATIENT
Start: 2022-01-01 | End: 2022-01-01

## 2022-01-01 RX ORDER — LIDOCAINE HYDROCHLORIDE 40 MG/ML
1.5 SOLUTION TOPICAL ONCE
Status: COMPLETED | OUTPATIENT
Start: 2022-01-01 | End: 2022-01-01

## 2022-01-01 RX ORDER — FLUMAZENIL 0.1 MG/ML
0.2 INJECTION, SOLUTION INTRAVENOUS
Status: DISCONTINUED | OUTPATIENT
Start: 2022-01-01 | End: 2022-01-01

## 2022-01-01 RX ORDER — SODIUM CHLORIDE, SODIUM LACTATE, POTASSIUM CHLORIDE, CALCIUM CHLORIDE 600; 310; 30; 20 MG/100ML; MG/100ML; MG/100ML; MG/100ML
INJECTION, SOLUTION INTRAVENOUS CONTINUOUS
Status: ACTIVE | OUTPATIENT
Start: 2022-01-01 | End: 2022-01-01

## 2022-01-01 RX ORDER — HYDROMORPHONE HCL IN WATER/PF 6 MG/30 ML
0.4 PATIENT CONTROLLED ANALGESIA SYRINGE INTRAVENOUS
Status: DISCONTINUED | OUTPATIENT
Start: 2022-01-01 | End: 2022-01-01

## 2022-01-01 RX ORDER — NITROGLYCERIN 0.4 MG/1
0.4 TABLET SUBLINGUAL EVERY 5 MIN PRN
Status: DISCONTINUED | OUTPATIENT
Start: 2022-01-01 | End: 2022-01-01

## 2022-01-01 RX ORDER — FENTANYL CITRATE 50 UG/ML
INJECTION, SOLUTION INTRAMUSCULAR; INTRAVENOUS PRN
Status: DISCONTINUED | OUTPATIENT
Start: 2022-01-01 | End: 2022-01-01

## 2022-01-01 RX ORDER — ACETAMINOPHEN 650 MG/1
650 SUPPOSITORY RECTAL EVERY 6 HOURS PRN
Status: DISCONTINUED | OUTPATIENT
Start: 2022-01-01 | End: 2022-01-01 | Stop reason: HOSPADM

## 2022-01-01 RX ORDER — ROSUVASTATIN CALCIUM 20 MG/1
20 TABLET, COATED ORAL DAILY
Status: DISCONTINUED | OUTPATIENT
Start: 2022-01-01 | End: 2022-01-01 | Stop reason: CLARIF

## 2022-01-01 RX ORDER — MORPHINE SULFATE 20 MG/ML
5-10 SOLUTION ORAL
Status: DISCONTINUED | OUTPATIENT
Start: 2022-01-01 | End: 2022-01-01 | Stop reason: HOSPADM

## 2022-01-01 RX ORDER — IOPAMIDOL 755 MG/ML
58 INJECTION, SOLUTION INTRAVASCULAR ONCE
Status: COMPLETED | OUTPATIENT
Start: 2022-01-01 | End: 2022-01-01

## 2022-01-01 RX ORDER — METOPROLOL TARTRATE 1 MG/ML
5 INJECTION, SOLUTION INTRAVENOUS EVERY 4 HOURS PRN
Status: DISCONTINUED | OUTPATIENT
Start: 2022-01-01 | End: 2022-01-01 | Stop reason: CLARIF

## 2022-01-01 RX ORDER — HYDROMORPHONE HCL IN WATER/PF 6 MG/30 ML
0.4 PATIENT CONTROLLED ANALGESIA SYRINGE INTRAVENOUS ONCE
Status: DISCONTINUED | OUTPATIENT
Start: 2022-01-01 | End: 2022-01-01 | Stop reason: HOSPADM

## 2022-01-01 RX ORDER — PROPOFOL 10 MG/ML
INJECTION, EMULSION INTRAVENOUS CONTINUOUS PRN
Status: DISCONTINUED | OUTPATIENT
Start: 2022-01-01 | End: 2022-01-01

## 2022-01-01 RX ORDER — PROPOFOL 10 MG/ML
INJECTION, EMULSION INTRAVENOUS PRN
Status: DISCONTINUED | OUTPATIENT
Start: 2022-01-01 | End: 2022-01-01

## 2022-01-01 RX ORDER — OXYCODONE AND ACETAMINOPHEN 5; 325 MG/1; MG/1
1 TABLET ORAL ONCE
Status: COMPLETED | OUTPATIENT
Start: 2022-01-01 | End: 2022-01-01

## 2022-01-01 RX ORDER — DEXAMETHASONE SODIUM PHOSPHATE 4 MG/ML
INJECTION, SOLUTION INTRA-ARTICULAR; INTRALESIONAL; INTRAMUSCULAR; INTRAVENOUS; SOFT TISSUE PRN
Status: DISCONTINUED | OUTPATIENT
Start: 2022-01-01 | End: 2022-01-01

## 2022-01-01 RX ORDER — DEXTROSE MONOHYDRATE 25 G/50ML
9.5 INJECTION, SOLUTION INTRAVENOUS
Status: DISCONTINUED | OUTPATIENT
Start: 2022-01-01 | End: 2022-01-01

## 2022-01-01 RX ORDER — AMOXICILLIN 250 MG
1 CAPSULE ORAL 2 TIMES DAILY
Status: DISCONTINUED | OUTPATIENT
Start: 2022-01-01 | End: 2022-01-01

## 2022-01-01 RX ORDER — AMIODARONE HYDROCHLORIDE 200 MG/1
200 TABLET ORAL DAILY
Status: DISCONTINUED | OUTPATIENT
Start: 2022-05-16 | End: 2022-01-01 | Stop reason: CLARIF

## 2022-01-01 RX ORDER — VANCOMYCIN HYDROCHLORIDE 500 MG/10ML
500 INJECTION, POWDER, LYOPHILIZED, FOR SOLUTION INTRAVENOUS EVERY 12 HOURS
Status: DISCONTINUED | OUTPATIENT
Start: 2022-01-01 | End: 2022-01-01 | Stop reason: CLARIF

## 2022-01-01 RX ORDER — MAGNESIUM HYDROXIDE 1200 MG/15ML
LIQUID ORAL PRN
Status: DISCONTINUED | OUTPATIENT
Start: 2022-01-01 | End: 2022-01-01 | Stop reason: HOSPADM

## 2022-01-01 RX ORDER — METOPROLOL TARTRATE 1 MG/ML
5 INJECTION, SOLUTION INTRAVENOUS ONCE
Status: COMPLETED | OUTPATIENT
Start: 2022-01-01 | End: 2022-01-01

## 2022-01-01 RX ORDER — ACETAMINOPHEN 325 MG/1
975 TABLET ORAL 3 TIMES DAILY
Status: DISCONTINUED | OUTPATIENT
Start: 2022-01-01 | End: 2022-01-01 | Stop reason: HOSPADM

## 2022-01-01 RX ORDER — SODIUM CHLORIDE 9 MG/ML
INJECTION, SOLUTION INTRAVENOUS CONTINUOUS PRN
Status: DISCONTINUED | OUTPATIENT
Start: 2022-01-01 | End: 2022-01-01 | Stop reason: CLARIF

## 2022-01-01 RX ORDER — ASPIRIN 325 MG
325 TABLET ORAL ONCE
Status: DISCONTINUED | OUTPATIENT
Start: 2022-01-01 | End: 2022-01-01 | Stop reason: HOSPADM

## 2022-01-01 RX ORDER — CEFTRIAXONE 2 G/1
2 INJECTION, POWDER, FOR SOLUTION INTRAMUSCULAR; INTRAVENOUS ONCE
Status: COMPLETED | OUTPATIENT
Start: 2022-01-01 | End: 2022-01-01

## 2022-01-01 RX ORDER — DEXTROSE MONOHYDRATE 25 G/50ML
9.5 INJECTION, SOLUTION INTRAVENOUS
Status: DISCONTINUED | OUTPATIENT
Start: 2022-01-01 | End: 2022-01-01 | Stop reason: CLARIF

## 2022-01-01 RX ORDER — HALOPERIDOL 2 MG/ML
1 SOLUTION ORAL
Status: DISCONTINUED | OUTPATIENT
Start: 2022-01-01 | End: 2022-01-01 | Stop reason: HOSPADM

## 2022-01-01 RX ORDER — PROCHLORPERAZINE MALEATE 5 MG
5 TABLET ORAL EVERY 6 HOURS PRN
Status: DISCONTINUED | OUTPATIENT
Start: 2022-01-01 | End: 2022-01-01 | Stop reason: HOSPADM

## 2022-01-01 RX ORDER — CEFTRIAXONE 2 G/1
2 INJECTION, POWDER, FOR SOLUTION INTRAMUSCULAR; INTRAVENOUS ONCE
Status: DISCONTINUED | OUTPATIENT
Start: 2022-01-01 | End: 2022-01-01

## 2022-01-01 RX ORDER — ONDANSETRON 4 MG/1
4 TABLET, ORALLY DISINTEGRATING ORAL EVERY 30 MIN PRN
Status: DISCONTINUED | OUTPATIENT
Start: 2022-01-01 | End: 2022-01-01 | Stop reason: HOSPADM

## 2022-01-01 RX ORDER — ONDANSETRON 2 MG/ML
4 INJECTION INTRAMUSCULAR; INTRAVENOUS EVERY 6 HOURS PRN
Status: DISCONTINUED | OUTPATIENT
Start: 2022-01-01 | End: 2022-01-01 | Stop reason: HOSPADM

## 2022-01-01 RX ORDER — LORAZEPAM 2 MG/ML
1 CONCENTRATE ORAL
Status: DISCONTINUED | OUTPATIENT
Start: 2022-01-01 | End: 2022-01-01 | Stop reason: HOSPADM

## 2022-01-01 RX ORDER — IBUPROFEN 200 MG
200 TABLET ORAL EVERY 4 HOURS PRN
COMMUNITY

## 2022-01-01 RX ORDER — ONDANSETRON 4 MG/1
4 TABLET, ORALLY DISINTEGRATING ORAL EVERY 6 HOURS PRN
Status: DISCONTINUED | OUTPATIENT
Start: 2022-01-01 | End: 2022-01-01 | Stop reason: HOSPADM

## 2022-01-01 RX ORDER — GLYCOPYRROLATE 1 MG/1
2 TABLET ORAL EVERY 4 HOURS PRN
Status: DISCONTINUED | OUTPATIENT
Start: 2022-01-01 | End: 2022-01-01

## 2022-01-01 RX ORDER — LORAZEPAM 2 MG/ML
1 INJECTION INTRAMUSCULAR
Status: DISCONTINUED | OUTPATIENT
Start: 2022-01-01 | End: 2022-01-01 | Stop reason: HOSPADM

## 2022-01-01 RX ORDER — HYDROMORPHONE HCL IN WATER/PF 6 MG/30 ML
0.2 PATIENT CONTROLLED ANALGESIA SYRINGE INTRAVENOUS
Status: DISCONTINUED | OUTPATIENT
Start: 2022-01-01 | End: 2022-01-01

## 2022-01-01 RX ORDER — AZITHROMYCIN 500 MG/1
500 INJECTION, POWDER, LYOPHILIZED, FOR SOLUTION INTRAVENOUS ONCE
Status: COMPLETED | OUTPATIENT
Start: 2022-01-01 | End: 2022-01-01

## 2022-01-01 RX ORDER — METHYLPREDNISOLONE 4 MG
TABLET, DOSE PACK ORAL SEE ADMIN INSTRUCTIONS
COMMUNITY

## 2022-01-01 RX ORDER — FENTANYL CITRATE 50 UG/ML
25 INJECTION, SOLUTION INTRAMUSCULAR; INTRAVENOUS
Status: DISCONTINUED | OUTPATIENT
Start: 2022-01-01 | End: 2022-01-01

## 2022-01-01 RX ORDER — ONDANSETRON 2 MG/ML
4 INJECTION INTRAMUSCULAR; INTRAVENOUS EVERY 6 HOURS PRN
Status: DISCONTINUED | OUTPATIENT
Start: 2022-01-01 | End: 2022-01-01

## 2022-01-01 RX ORDER — HYDROMORPHONE HYDROCHLORIDE 1 MG/ML
0.3 INJECTION, SOLUTION INTRAMUSCULAR; INTRAVENOUS; SUBCUTANEOUS ONCE
Status: COMPLETED | OUTPATIENT
Start: 2022-01-01 | End: 2022-01-01

## 2022-01-01 RX ORDER — ONDANSETRON 2 MG/ML
4 INJECTION INTRAMUSCULAR; INTRAVENOUS EVERY 30 MIN PRN
Status: DISCONTINUED | OUTPATIENT
Start: 2022-01-01 | End: 2022-01-01

## 2022-01-01 RX ORDER — LABETALOL HYDROCHLORIDE 5 MG/ML
10 INJECTION, SOLUTION INTRAVENOUS
Status: DISCONTINUED | OUTPATIENT
Start: 2022-01-01 | End: 2022-01-01 | Stop reason: HOSPADM

## 2022-01-01 RX ORDER — FLUMAZENIL 0.1 MG/ML
0.2 INJECTION, SOLUTION INTRAVENOUS
Status: DISCONTINUED | OUTPATIENT
Start: 2022-01-01 | End: 2022-01-01 | Stop reason: HOSPADM

## 2022-01-01 RX ORDER — ONDANSETRON 4 MG/1
4 TABLET, ORALLY DISINTEGRATING ORAL EVERY 6 HOURS PRN
Status: DISCONTINUED | OUTPATIENT
Start: 2022-01-01 | End: 2022-01-01

## 2022-01-01 RX ORDER — SODIUM CHLORIDE 9 MG/ML
INJECTION, SOLUTION INTRAVENOUS CONTINUOUS PRN
Status: DISCONTINUED | OUTPATIENT
Start: 2022-01-01 | End: 2022-01-01

## 2022-01-01 RX ORDER — ASPIRIN 81 MG/1
81 TABLET ORAL 2 TIMES DAILY
Status: DISCONTINUED | OUTPATIENT
Start: 2022-01-01 | End: 2022-01-01

## 2022-01-01 RX ORDER — ACETAMINOPHEN 325 MG/1
650 TABLET ORAL EVERY 6 HOURS PRN
Qty: 30 TABLET | Refills: 0 | DISCHARGE
Start: 2022-01-01

## 2022-01-01 RX ORDER — FENTANYL CITRATE 50 UG/ML
25 INJECTION, SOLUTION INTRAMUSCULAR; INTRAVENOUS
Status: DISCONTINUED | OUTPATIENT
Start: 2022-01-01 | End: 2022-01-01 | Stop reason: CLARIF

## 2022-01-01 RX ORDER — AMOXICILLIN 250 MG
1 CAPSULE ORAL 2 TIMES DAILY PRN
Qty: 20 TABLET | DISCHARGE
Start: 2022-01-01

## 2022-01-01 RX ORDER — CEFAZOLIN SODIUM 1 G/3ML
1 INJECTION, POWDER, FOR SOLUTION INTRAMUSCULAR; INTRAVENOUS EVERY 8 HOURS
Status: DISCONTINUED | OUTPATIENT
Start: 2022-01-01 | End: 2022-01-01

## 2022-01-01 RX ORDER — IOPAMIDOL 755 MG/ML
65 INJECTION, SOLUTION INTRAVASCULAR ONCE
Status: COMPLETED | OUTPATIENT
Start: 2022-01-01 | End: 2022-01-01

## 2022-01-01 RX ORDER — ACETAMINOPHEN 325 MG/1
650 TABLET ORAL 4 TIMES DAILY
Status: DISCONTINUED | OUTPATIENT
Start: 2022-01-01 | End: 2022-01-01

## 2022-01-01 RX ORDER — HYDROMORPHONE HCL IN WATER/PF 6 MG/30 ML
.2-.4 PATIENT CONTROLLED ANALGESIA SYRINGE INTRAVENOUS
Status: DISCONTINUED | OUTPATIENT
Start: 2022-01-01 | End: 2022-01-01 | Stop reason: HOSPADM

## 2022-01-01 RX ORDER — HYDROMORPHONE HYDROCHLORIDE 1 MG/ML
1-2 SOLUTION ORAL
Status: DISCONTINUED | OUTPATIENT
Start: 2022-01-01 | End: 2022-01-01 | Stop reason: ALTCHOICE

## 2022-01-01 RX ORDER — LORAZEPAM 1 MG/1
1 TABLET ORAL
Status: DISCONTINUED | OUTPATIENT
Start: 2022-01-01 | End: 2022-01-01 | Stop reason: ALTCHOICE

## 2022-01-01 RX ORDER — LIDOCAINE HYDROCHLORIDE 20 MG/ML
INJECTION, SOLUTION INFILTRATION; PERINEURAL
Status: COMPLETED | OUTPATIENT
Start: 2022-01-01 | End: 2022-01-01

## 2022-01-01 RX ORDER — AMOXICILLIN 250 MG
1 CAPSULE ORAL 2 TIMES DAILY
Status: DISCONTINUED | OUTPATIENT
Start: 2022-01-01 | End: 2022-01-01 | Stop reason: HOSPADM

## 2022-01-01 RX ORDER — GLYCOPYRROLATE 0.2 MG/ML
0.1 INJECTION, SOLUTION INTRAMUSCULAR; INTRAVENOUS ONCE
Status: COMPLETED | OUTPATIENT
Start: 2022-01-01 | End: 2022-01-01

## 2022-01-01 RX ORDER — ACETAMINOPHEN 325 MG/1
650 TABLET ORAL EVERY 6 HOURS PRN
Status: DISCONTINUED | OUTPATIENT
Start: 2022-01-01 | End: 2022-01-01 | Stop reason: HOSPADM

## 2022-01-01 RX ORDER — OXYCODONE HYDROCHLORIDE 5 MG/1
5 TABLET ORAL EVERY 4 HOURS PRN
Status: DISCONTINUED | OUTPATIENT
Start: 2022-01-01 | End: 2022-01-01 | Stop reason: HOSPADM

## 2022-01-01 RX ORDER — EPINEPHRINE IN SOD CHLOR,ISO 1 MG/10 ML
SYRINGE (ML) INTRAVENOUS PRN
Status: DISCONTINUED | OUTPATIENT
Start: 2022-01-01 | End: 2022-01-01 | Stop reason: HOSPADM

## 2022-01-01 RX ORDER — BISACODYL 10 MG
10 SUPPOSITORY, RECTAL RECTAL DAILY PRN
Status: DISCONTINUED | OUTPATIENT
Start: 2022-01-01 | End: 2022-01-01 | Stop reason: HOSPADM

## 2022-01-01 RX ORDER — ACETAMINOPHEN 325 MG/1
650 TABLET ORAL EVERY 4 HOURS PRN
Status: DISCONTINUED | OUTPATIENT
Start: 2022-01-01 | End: 2022-01-01

## 2022-01-01 RX ORDER — ALBUTEROL SULFATE 0.83 MG/ML
2.5 SOLUTION RESPIRATORY (INHALATION) EVERY 4 HOURS PRN
Status: DISCONTINUED | OUTPATIENT
Start: 2022-01-01 | End: 2022-01-01 | Stop reason: CLARIF

## 2022-01-01 RX ORDER — CEFAZOLIN SODIUM 2 G/100ML
2 INJECTION, SOLUTION INTRAVENOUS EVERY 8 HOURS
Status: DISCONTINUED | OUTPATIENT
Start: 2022-01-01 | End: 2022-01-01 | Stop reason: CLARIF

## 2022-01-01 RX ORDER — DEXMEDETOMIDINE HYDROCHLORIDE 4 UG/ML
INJECTION, SOLUTION INTRAVENOUS PRN
Status: DISCONTINUED | OUTPATIENT
Start: 2022-01-01 | End: 2022-01-01

## 2022-01-01 RX ORDER — AMOXICILLIN 250 MG
2 CAPSULE ORAL 2 TIMES DAILY
Status: DISCONTINUED | OUTPATIENT
Start: 2022-01-01 | End: 2022-01-01 | Stop reason: HOSPADM

## 2022-01-01 RX ORDER — SODIUM CHLORIDE, SODIUM LACTATE, POTASSIUM CHLORIDE, CALCIUM CHLORIDE 600; 310; 30; 20 MG/100ML; MG/100ML; MG/100ML; MG/100ML
INJECTION, SOLUTION INTRAVENOUS CONTINUOUS PRN
Status: DISCONTINUED | OUTPATIENT
Start: 2022-01-01 | End: 2022-01-01

## 2022-01-01 RX ORDER — HYDROXYZINE HYDROCHLORIDE 25 MG/1
25 TABLET, FILM COATED ORAL 3 TIMES DAILY
Status: DISCONTINUED | OUTPATIENT
Start: 2022-01-01 | End: 2022-01-01 | Stop reason: CLARIF

## 2022-01-01 RX ORDER — CALCIUM CARBONATE 500 MG/1
500 TABLET, CHEWABLE ORAL 3 TIMES DAILY PRN
Status: DISCONTINUED | OUTPATIENT
Start: 2022-01-01 | End: 2022-01-01 | Stop reason: HOSPADM

## 2022-01-01 RX ORDER — ONDANSETRON 2 MG/ML
4 INJECTION INTRAMUSCULAR; INTRAVENOUS EVERY 30 MIN PRN
Status: DISCONTINUED | OUTPATIENT
Start: 2022-01-01 | End: 2022-01-01 | Stop reason: HOSPADM

## 2022-01-01 RX ORDER — DIGOXIN 0.25 MG/ML
250 INJECTION INTRAMUSCULAR; INTRAVENOUS ONCE
Status: COMPLETED | OUTPATIENT
Start: 2022-01-01 | End: 2022-01-01

## 2022-01-01 RX ORDER — ACETAMINOPHEN 500 MG
1000 TABLET ORAL 3 TIMES DAILY
Status: DISCONTINUED | OUTPATIENT
Start: 2022-01-01 | End: 2022-01-01

## 2022-01-01 RX ORDER — HYDROMORPHONE HCL IN WATER/PF 6 MG/30 ML
0.2 PATIENT CONTROLLED ANALGESIA SYRINGE INTRAVENOUS ONCE
Status: COMPLETED | OUTPATIENT
Start: 2022-01-01 | End: 2022-01-01

## 2022-01-01 RX ADMIN — ACETAMINOPHEN 975 MG: 325 TABLET ORAL at 04:13

## 2022-01-01 RX ADMIN — CEFAZOLIN SODIUM 2 G: 2 INJECTION, SOLUTION INTRAVENOUS at 21:46

## 2022-01-01 RX ADMIN — DILTIAZEM HYDROCHLORIDE 5 MG/HR: 5 INJECTION, SOLUTION INTRAVENOUS at 18:10

## 2022-01-01 RX ADMIN — OXYCODONE HYDROCHLORIDE AND ACETAMINOPHEN 1 TABLET: 5; 325 TABLET ORAL at 08:09

## 2022-01-01 RX ADMIN — DEXMEDETOMIDINE HYDROCHLORIDE 8 MCG: 200 INJECTION INTRAVENOUS at 12:00

## 2022-01-01 RX ADMIN — MORPHINE SULFATE 10 MG: 10 SOLUTION ORAL at 12:19

## 2022-01-01 RX ADMIN — PROCHLORPERAZINE MALEATE 5 MG: 5 TABLET ORAL at 00:58

## 2022-01-01 RX ADMIN — CEFAZOLIN SODIUM 2 G: 2 INJECTION, SOLUTION INTRAVENOUS at 05:18

## 2022-01-01 RX ADMIN — ATROPINE SULFATE 4 DROP: 10 SOLUTION OPHTHALMIC at 12:38

## 2022-01-01 RX ADMIN — TOPICAL ANESTHETIC 1 ML: 200 SPRAY DENTAL; PERIODONTAL at 15:19

## 2022-01-01 RX ADMIN — ASPIRIN 81 MG: 81 TABLET, COATED ORAL at 08:37

## 2022-01-01 RX ADMIN — ONDANSETRON 4 MG: 2 INJECTION INTRAMUSCULAR; INTRAVENOUS at 12:20

## 2022-01-01 RX ADMIN — CALCIUM CARBONATE (ANTACID) CHEW TAB 500 MG 500 MG: 500 CHEW TAB at 15:21

## 2022-01-01 RX ADMIN — MORPHINE SULFATE 10 MG: 10 SOLUTION ORAL at 23:56

## 2022-01-01 RX ADMIN — HYDROMORPHONE HYDROCHLORIDE 1 MG: 2 TABLET ORAL at 22:32

## 2022-01-01 RX ADMIN — ONDANSETRON 4 MG: 2 INJECTION INTRAMUSCULAR; INTRAVENOUS at 14:53

## 2022-01-01 RX ADMIN — HYDROMORPHONE HYDROCHLORIDE 0.2 MG: 0.2 INJECTION, SOLUTION INTRAMUSCULAR; INTRAVENOUS; SUBCUTANEOUS at 08:38

## 2022-01-01 RX ADMIN — ONDANSETRON 4 MG: 2 INJECTION INTRAMUSCULAR; INTRAVENOUS at 17:28

## 2022-01-01 RX ADMIN — ROCURONIUM BROMIDE 30 MG: 50 INJECTION, SOLUTION INTRAVENOUS at 13:58

## 2022-01-01 RX ADMIN — ASPIRIN 81 MG: 81 TABLET, COATED ORAL at 20:56

## 2022-01-01 RX ADMIN — CEFTRIAXONE SODIUM 2 G: 2 INJECTION, POWDER, FOR SOLUTION INTRAMUSCULAR; INTRAVENOUS at 17:12

## 2022-01-01 RX ADMIN — HYDROMORPHONE HYDROCHLORIDE 1 MG: 2 TABLET ORAL at 17:42

## 2022-01-01 RX ADMIN — SUCCINYLCHOLINE CHLORIDE 80 MG: 20 INJECTION, SOLUTION INTRAMUSCULAR; INTRAVENOUS; PARENTERAL at 11:19

## 2022-01-01 RX ADMIN — ALBUTEROL SULFATE 2.5 MG: 2.5 SOLUTION RESPIRATORY (INHALATION) at 02:18

## 2022-01-01 RX ADMIN — PROPOFOL 30 MG: 10 INJECTION, EMULSION INTRAVENOUS at 12:05

## 2022-01-01 RX ADMIN — METOPROLOL TARTRATE 5 MG: 5 INJECTION INTRAVENOUS at 20:10

## 2022-01-01 RX ADMIN — AMIODARONE HYDROCHLORIDE 200 MG: 200 TABLET ORAL at 21:07

## 2022-01-01 RX ADMIN — ASPIRIN 81 MG: 81 TABLET, COATED ORAL at 21:01

## 2022-01-01 RX ADMIN — OXYCODONE HYDROCHLORIDE 5 MG: 5 TABLET ORAL at 14:35

## 2022-01-01 RX ADMIN — CEFAZOLIN SODIUM 2 G: 2 INJECTION, SOLUTION INTRAVENOUS at 06:10

## 2022-01-01 RX ADMIN — PHENYLEPHRINE HYDROCHLORIDE 100 MCG: 10 INJECTION INTRAVENOUS at 14:01

## 2022-01-01 RX ADMIN — CEFAZOLIN SODIUM 2 G: 2 INJECTION, SOLUTION INTRAVENOUS at 04:11

## 2022-01-01 RX ADMIN — ACETAMINOPHEN 975 MG: 325 TABLET ORAL at 08:11

## 2022-01-01 RX ADMIN — ONDANSETRON 4 MG: 2 INJECTION INTRAMUSCULAR; INTRAVENOUS at 15:34

## 2022-01-01 RX ADMIN — MORPHINE SULFATE 10 MG: 10 SOLUTION ORAL at 14:03

## 2022-01-01 RX ADMIN — ACETAMINOPHEN 975 MG: 325 TABLET ORAL at 12:08

## 2022-01-01 RX ADMIN — ASPIRIN 81 MG: 81 TABLET, COATED ORAL at 08:40

## 2022-01-01 RX ADMIN — FENTANYL CITRATE 25 MCG: 50 INJECTION, SOLUTION INTRAMUSCULAR; INTRAVENOUS at 16:38

## 2022-01-01 RX ADMIN — SODIUM CHLORIDE, POTASSIUM CHLORIDE, SODIUM LACTATE AND CALCIUM CHLORIDE: 600; 310; 30; 20 INJECTION, SOLUTION INTRAVENOUS at 15:22

## 2022-01-01 RX ADMIN — MIDAZOLAM HYDROCHLORIDE 2 MG: 1 INJECTION, SOLUTION INTRAMUSCULAR; INTRAVENOUS at 15:23

## 2022-01-01 RX ADMIN — ACETAMINOPHEN 975 MG: 325 TABLET ORAL at 09:19

## 2022-01-01 RX ADMIN — MIDAZOLAM HYDROCHLORIDE 1 MG: 1 INJECTION, SOLUTION INTRAMUSCULAR; INTRAVENOUS at 15:29

## 2022-01-01 RX ADMIN — CEFAZOLIN SODIUM 2 G: 2 INJECTION, SOLUTION INTRAVENOUS at 20:41

## 2022-01-01 RX ADMIN — CEFAZOLIN SODIUM 2 G: 2 INJECTION, SOLUTION INTRAVENOUS at 12:54

## 2022-01-01 RX ADMIN — ACETAMINOPHEN 650 MG: 325 TABLET ORAL at 23:55

## 2022-01-01 RX ADMIN — SODIUM CHLORIDE, POTASSIUM CHLORIDE, SODIUM LACTATE AND CALCIUM CHLORIDE: 600; 310; 30; 20 INJECTION, SOLUTION INTRAVENOUS at 12:56

## 2022-01-01 RX ADMIN — SODIUM CHLORIDE 60 ML: 900 INJECTION INTRAVENOUS at 14:23

## 2022-01-01 RX ADMIN — ROSUVASTATIN CALCIUM 20 MG: 20 TABLET, FILM COATED ORAL at 08:24

## 2022-01-01 RX ADMIN — ALBUTEROL SULFATE 2.5 MG: 2.5 SOLUTION RESPIRATORY (INHALATION) at 05:21

## 2022-01-01 RX ADMIN — ROSUVASTATIN CALCIUM 20 MG: 20 TABLET, FILM COATED ORAL at 08:19

## 2022-01-01 RX ADMIN — AMIODARONE HYDROCHLORIDE 200 MG: 200 TABLET ORAL at 08:11

## 2022-01-01 RX ADMIN — ACETAMINOPHEN 650 MG: 325 TABLET ORAL at 06:07

## 2022-01-01 RX ADMIN — VANCOMYCIN HYDROCHLORIDE 1500 MG: 5 INJECTION, POWDER, LYOPHILIZED, FOR SOLUTION INTRAVENOUS at 20:33

## 2022-01-01 RX ADMIN — CALCIUM CARBONATE (ANTACID) CHEW TAB 500 MG 500 MG: 500 CHEW TAB at 22:08

## 2022-01-01 RX ADMIN — PROPOFOL 30 MCG/KG/MIN: 10 INJECTION, EMULSION INTRAVENOUS at 11:40

## 2022-01-01 RX ADMIN — HYDROMORPHONE HYDROCHLORIDE 2 MG: 2 TABLET ORAL at 21:14

## 2022-01-01 RX ADMIN — HYDROMORPHONE HYDROCHLORIDE 1 MG: 2 TABLET ORAL at 00:03

## 2022-01-01 RX ADMIN — LORAZEPAM 1 MG: 1 TABLET ORAL at 13:04

## 2022-01-01 RX ADMIN — FENTANYL CITRATE 25 MCG: 50 INJECTION, SOLUTION INTRAMUSCULAR; INTRAVENOUS at 15:25

## 2022-01-01 RX ADMIN — MORPHINE SULFATE 10 MG: 10 SOLUTION ORAL at 14:17

## 2022-01-01 RX ADMIN — HYDROXYZINE HYDROCHLORIDE 25 MG: 25 TABLET, FILM COATED ORAL at 08:04

## 2022-01-01 RX ADMIN — AMIODARONE HYDROCHLORIDE 150 MG: 1.5 INJECTION, SOLUTION INTRAVENOUS at 20:17

## 2022-01-01 RX ADMIN — ACETAMINOPHEN 650 MG: 325 TABLET ORAL at 13:14

## 2022-01-01 RX ADMIN — FENTANYL CITRATE 25 MCG: 50 INJECTION, SOLUTION INTRAMUSCULAR; INTRAVENOUS at 17:00

## 2022-01-01 RX ADMIN — SODIUM CHLORIDE 1000 ML: 9 INJECTION, SOLUTION INTRAVENOUS at 12:00

## 2022-01-01 RX ADMIN — LORAZEPAM 1 MG: 1 TABLET ORAL at 03:46

## 2022-01-01 RX ADMIN — MORPHINE SULFATE 10 MG: 10 SOLUTION ORAL at 09:54

## 2022-01-01 RX ADMIN — CEFAZOLIN SODIUM 2 G: 2 INJECTION, SOLUTION INTRAVENOUS at 13:25

## 2022-01-01 RX ADMIN — ASPIRIN 325 MG: 81 TABLET, COATED ORAL at 16:53

## 2022-01-01 RX ADMIN — ROSUVASTATIN CALCIUM 20 MG: 20 TABLET, FILM COATED ORAL at 08:31

## 2022-01-01 RX ADMIN — METOPROLOL TARTRATE 5 MG: 5 INJECTION INTRAVENOUS at 13:16

## 2022-01-01 RX ADMIN — PHENYLEPHRINE HYDROCHLORIDE 50 MCG: 10 INJECTION INTRAVENOUS at 14:35

## 2022-01-01 RX ADMIN — CEFAZOLIN SODIUM 2 G: 2 INJECTION, SOLUTION INTRAVENOUS at 04:05

## 2022-01-01 RX ADMIN — ROSUVASTATIN CALCIUM 20 MG: 20 TABLET, FILM COATED ORAL at 08:34

## 2022-01-01 RX ADMIN — AMIODARONE HYDROCHLORIDE 200 MG: 200 TABLET ORAL at 20:41

## 2022-01-01 RX ADMIN — CEFAZOLIN SODIUM 2 G: 2 INJECTION, SOLUTION INTRAVENOUS at 12:09

## 2022-01-01 RX ADMIN — ASPIRIN 81 MG: 81 TABLET, COATED ORAL at 21:03

## 2022-01-01 RX ADMIN — ACETAMINOPHEN 975 MG: 325 TABLET ORAL at 21:01

## 2022-01-01 RX ADMIN — SODIUM CHLORIDE 1 MG/MIN: 9 INJECTION, SOLUTION INTRAVENOUS at 20:49

## 2022-01-01 RX ADMIN — CYCLOBENZAPRINE HYDROCHLORIDE 10 MG: 5 TABLET, FILM COATED ORAL at 08:03

## 2022-01-01 RX ADMIN — METOPROLOL TARTRATE 5 MG: 5 INJECTION INTRAVENOUS at 07:59

## 2022-01-01 RX ADMIN — PANTOPRAZOLE SODIUM 40 MG: 40 INJECTION, POWDER, FOR SOLUTION INTRAVENOUS at 08:15

## 2022-01-01 RX ADMIN — OXYCODONE HYDROCHLORIDE 5 MG: 5 TABLET ORAL at 08:03

## 2022-01-01 RX ADMIN — OXYCODONE HYDROCHLORIDE 5 MG: 5 TABLET ORAL at 18:03

## 2022-01-01 RX ADMIN — HYDROXYZINE HYDROCHLORIDE 25 MG: 25 TABLET, FILM COATED ORAL at 21:46

## 2022-01-01 RX ADMIN — HYDROXYZINE HYDROCHLORIDE 25 MG: 25 TABLET, FILM COATED ORAL at 13:09

## 2022-01-01 RX ADMIN — ACETAMINOPHEN 975 MG: 325 TABLET ORAL at 04:27

## 2022-01-01 RX ADMIN — MORPHINE SULFATE 10 MG: 10 SOLUTION ORAL at 02:09

## 2022-01-01 RX ADMIN — SODIUM CHLORIDE: 9 INJECTION, SOLUTION INTRAVENOUS at 23:25

## 2022-01-01 RX ADMIN — CEFAZOLIN SODIUM 2 G: 2 INJECTION, SOLUTION INTRAVENOUS at 04:10

## 2022-01-01 RX ADMIN — CALCIUM CARBONATE (ANTACID) CHEW TAB 500 MG 500 MG: 500 CHEW TAB at 13:16

## 2022-01-01 RX ADMIN — HYDROXYZINE HYDROCHLORIDE 25 MG: 25 TABLET, FILM COATED ORAL at 16:11

## 2022-01-01 RX ADMIN — ASPIRIN 81 MG: 81 TABLET, COATED ORAL at 08:12

## 2022-01-01 RX ADMIN — ALBUTEROL SULFATE 2.5 MG: 2.5 SOLUTION RESPIRATORY (INHALATION) at 19:10

## 2022-01-01 RX ADMIN — SODIUM CHLORIDE: 9 INJECTION, SOLUTION INTRAVENOUS at 16:39

## 2022-01-01 RX ADMIN — AMIODARONE HYDROCHLORIDE 200 MG: 200 TABLET ORAL at 08:38

## 2022-01-01 RX ADMIN — AMIODARONE HYDROCHLORIDE 200 MG: 200 TABLET ORAL at 21:01

## 2022-01-01 RX ADMIN — METOPROLOL TARTRATE 5 MG: 5 INJECTION INTRAVENOUS at 11:18

## 2022-01-01 RX ADMIN — CEFAZOLIN SODIUM 2 G: 2 INJECTION, SOLUTION INTRAVENOUS at 12:57

## 2022-01-01 RX ADMIN — ACETAMINOPHEN 650 MG: 325 TABLET ORAL at 17:49

## 2022-01-01 RX ADMIN — DILTIAZEM HYDROCHLORIDE 15 MG/HR: 5 INJECTION, SOLUTION INTRAVENOUS at 16:34

## 2022-01-01 RX ADMIN — CEFAZOLIN SODIUM 2 G: 2 INJECTION, SOLUTION INTRAVENOUS at 13:09

## 2022-01-01 RX ADMIN — CEFAZOLIN SODIUM 2 G: 2 INJECTION, SOLUTION INTRAVENOUS at 21:54

## 2022-01-01 RX ADMIN — FENTANYL CITRATE 75 MCG: 50 INJECTION, SOLUTION INTRAMUSCULAR; INTRAVENOUS at 14:28

## 2022-01-01 RX ADMIN — ROSUVASTATIN CALCIUM 20 MG: 20 TABLET, FILM COATED ORAL at 09:38

## 2022-01-01 RX ADMIN — ACETAMINOPHEN 975 MG: 325 TABLET ORAL at 04:37

## 2022-01-01 RX ADMIN — METOPROLOL TARTRATE 5 MG: 5 INJECTION INTRAVENOUS at 07:26

## 2022-01-01 RX ADMIN — ASPIRIN 81 MG: 81 TABLET, COATED ORAL at 20:41

## 2022-01-01 RX ADMIN — FENTANYL CITRATE 50 MCG: 50 INJECTION, SOLUTION INTRAMUSCULAR; INTRAVENOUS at 15:23

## 2022-01-01 RX ADMIN — ACETAMINOPHEN 650 MG: 325 TABLET ORAL at 18:52

## 2022-01-01 RX ADMIN — ROSUVASTATIN CALCIUM 20 MG: 20 TABLET, FILM COATED ORAL at 08:37

## 2022-01-01 RX ADMIN — DILTIAZEM HYDROCHLORIDE 10 MG/HR: 5 INJECTION, SOLUTION INTRAVENOUS at 17:54

## 2022-01-01 RX ADMIN — ASPIRIN 81 MG: 81 TABLET, COATED ORAL at 20:09

## 2022-01-01 RX ADMIN — CEFAZOLIN SODIUM 2 G: 2 INJECTION, SOLUTION INTRAVENOUS at 20:08

## 2022-01-01 RX ADMIN — CEFAZOLIN SODIUM 2 G: 2 INJECTION, SOLUTION INTRAVENOUS at 20:49

## 2022-01-01 RX ADMIN — CEFAZOLIN SODIUM 2 G: 2 INJECTION, SOLUTION INTRAVENOUS at 20:02

## 2022-01-01 RX ADMIN — LIDOCAINE HYDROCHLORIDE 100 MG: 20 INJECTION, SOLUTION INFILTRATION; PERINEURAL at 11:18

## 2022-01-01 RX ADMIN — CEFAZOLIN SODIUM 2 G: 2 INJECTION, SOLUTION INTRAVENOUS at 11:57

## 2022-01-01 RX ADMIN — OXYCODONE HYDROCHLORIDE 5 MG: 5 TABLET ORAL at 13:49

## 2022-01-01 RX ADMIN — POLYETHYLENE GLYCOL 3350 17 G: 17 POWDER, FOR SOLUTION ORAL at 08:19

## 2022-01-01 RX ADMIN — CEFAZOLIN SODIUM 2 G: 2 INJECTION, SOLUTION INTRAVENOUS at 21:08

## 2022-01-01 RX ADMIN — Medication 1 MG: at 21:07

## 2022-01-01 RX ADMIN — ACETAMINOPHEN 650 MG: 325 TABLET ORAL at 19:43

## 2022-01-01 RX ADMIN — FENTANYL CITRATE 25 MCG: 50 INJECTION, SOLUTION INTRAMUSCULAR; INTRAVENOUS at 13:58

## 2022-01-01 RX ADMIN — FENTANYL CITRATE 50 MCG: 50 INJECTION, SOLUTION INTRAMUSCULAR; INTRAVENOUS at 11:42

## 2022-01-01 RX ADMIN — HYDROMORPHONE HYDROCHLORIDE 1 MG: 2 TABLET ORAL at 06:05

## 2022-01-01 RX ADMIN — HYDROMORPHONE HYDROCHLORIDE 2 MG: 2 TABLET ORAL at 05:18

## 2022-01-01 RX ADMIN — ACETAMINOPHEN 650 MG: 325 TABLET ORAL at 21:46

## 2022-01-01 RX ADMIN — POLYETHYLENE GLYCOL 3350 17 G: 17 POWDER, FOR SOLUTION ORAL at 08:31

## 2022-01-01 RX ADMIN — OXYCODONE HYDROCHLORIDE 5 MG: 5 TABLET ORAL at 00:50

## 2022-01-01 RX ADMIN — Medication 1 MG: at 21:01

## 2022-01-01 RX ADMIN — CEFAZOLIN SODIUM 2 G: 2 INJECTION, SOLUTION INTRAVENOUS at 13:11

## 2022-01-01 RX ADMIN — AMIODARONE HYDROCHLORIDE 200 MG: 200 TABLET ORAL at 08:04

## 2022-01-01 RX ADMIN — LORAZEPAM 1 MG: 1 TABLET ORAL at 16:35

## 2022-01-01 RX ADMIN — ASPIRIN 81 MG: 81 TABLET, COATED ORAL at 08:38

## 2022-01-01 RX ADMIN — SODIUM CHLORIDE, POTASSIUM CHLORIDE, SODIUM LACTATE AND CALCIUM CHLORIDE: 600; 310; 30; 20 INJECTION, SOLUTION INTRAVENOUS at 11:14

## 2022-01-01 RX ADMIN — HYDROMORPHONE HYDROCHLORIDE 1 MG: 2 TABLET ORAL at 15:26

## 2022-01-01 RX ADMIN — GLYCOPYRROLATE 0.1 MG: 0.2 INJECTION, SOLUTION INTRAMUSCULAR; INTRAVENOUS at 15:13

## 2022-01-01 RX ADMIN — SENNOSIDES AND DOCUSATE SODIUM 2 TABLET: 50; 8.6 TABLET ORAL at 08:31

## 2022-01-01 RX ADMIN — HYDROMORPHONE HYDROCHLORIDE 1 MG: 2 TABLET ORAL at 17:28

## 2022-01-01 RX ADMIN — ROSUVASTATIN CALCIUM 20 MG: 20 TABLET, FILM COATED ORAL at 08:04

## 2022-01-01 RX ADMIN — FENTANYL CITRATE 25 MCG: 50 INJECTION, SOLUTION INTRAMUSCULAR; INTRAVENOUS at 13:25

## 2022-01-01 RX ADMIN — HYDROMORPHONE HYDROCHLORIDE 0.2 MG: 0.2 INJECTION, SOLUTION INTRAMUSCULAR; INTRAVENOUS; SUBCUTANEOUS at 19:09

## 2022-01-01 RX ADMIN — ACETAMINOPHEN 650 MG: 325 TABLET ORAL at 17:38

## 2022-01-01 RX ADMIN — METOPROLOL TARTRATE 5 MG: 5 INJECTION INTRAVENOUS at 16:45

## 2022-01-01 RX ADMIN — OXYCODONE HYDROCHLORIDE AND ACETAMINOPHEN 1 TABLET: 5; 325 TABLET ORAL at 01:21

## 2022-01-01 RX ADMIN — IOPAMIDOL 58 ML: 755 INJECTION, SOLUTION INTRAVENOUS at 18:45

## 2022-01-01 RX ADMIN — CEFAZOLIN SODIUM 2 G: 2 INJECTION, SOLUTION INTRAVENOUS at 21:45

## 2022-01-01 RX ADMIN — ACETAMINOPHEN 650 MG: 325 TABLET ORAL at 21:44

## 2022-01-01 RX ADMIN — METOPROLOL TARTRATE 5 MG: 5 INJECTION INTRAVENOUS at 10:07

## 2022-01-01 RX ADMIN — LORAZEPAM 1 MG: 1 TABLET ORAL at 09:57

## 2022-01-01 RX ADMIN — ACETAMINOPHEN 975 MG: 325 TABLET ORAL at 21:56

## 2022-01-01 RX ADMIN — ACETAMINOPHEN 650 MG: 325 TABLET ORAL at 05:28

## 2022-01-01 RX ADMIN — ACETAMINOPHEN 650 MG: 325 TABLET ORAL at 18:02

## 2022-01-01 RX ADMIN — PHENYLEPHRINE HYDROCHLORIDE 50 MCG: 10 INJECTION INTRAVENOUS at 14:44

## 2022-01-01 RX ADMIN — ACETAMINOPHEN 650 MG: 325 TABLET ORAL at 12:57

## 2022-01-01 RX ADMIN — ROSUVASTATIN CALCIUM 20 MG: 20 TABLET, FILM COATED ORAL at 08:14

## 2022-01-01 RX ADMIN — ACETAMINOPHEN 650 MG: 325 TABLET ORAL at 08:03

## 2022-01-01 RX ADMIN — MORPHINE SULFATE 10 MG: 10 SOLUTION ORAL at 11:11

## 2022-01-01 RX ADMIN — CEFAZOLIN SODIUM 2 G: 2 INJECTION, SOLUTION INTRAVENOUS at 04:37

## 2022-01-01 RX ADMIN — AMIODARONE HYDROCHLORIDE 200 MG: 200 TABLET ORAL at 21:03

## 2022-01-01 RX ADMIN — LIDOCAINE HYDROCHLORIDE 2 ML: 20 INJECTION, SOLUTION INFILTRATION; PERINEURAL at 13:34

## 2022-01-01 RX ADMIN — CALCIUM CARBONATE (ANTACID) CHEW TAB 500 MG 500 MG: 500 CHEW TAB at 10:36

## 2022-01-01 RX ADMIN — OXYCODONE HYDROCHLORIDE AND ACETAMINOPHEN 1 TABLET: 5; 325 TABLET ORAL at 07:04

## 2022-01-01 RX ADMIN — MORPHINE SULFATE 10 MG: 10 SOLUTION ORAL at 12:01

## 2022-01-01 RX ADMIN — CEFAZOLIN SODIUM 2 G: 2 INJECTION, SOLUTION INTRAVENOUS at 05:54

## 2022-01-01 RX ADMIN — ACETAMINOPHEN 650 MG: 325 TABLET ORAL at 14:34

## 2022-01-01 RX ADMIN — OXYCODONE HYDROCHLORIDE 5 MG: 5 TABLET ORAL at 13:08

## 2022-01-01 RX ADMIN — PHENYLEPHRINE HYDROCHLORIDE 100 MCG: 10 INJECTION INTRAVENOUS at 11:33

## 2022-01-01 RX ADMIN — DEXAMETHASONE SODIUM PHOSPHATE 4 MG: 4 INJECTION, SOLUTION INTRA-ARTICULAR; INTRALESIONAL; INTRAMUSCULAR; INTRAVENOUS; SOFT TISSUE at 11:54

## 2022-01-01 RX ADMIN — ACETAMINOPHEN 650 MG: 325 TABLET ORAL at 13:15

## 2022-01-01 RX ADMIN — CEFAZOLIN SODIUM 2 G: 2 INJECTION, SOLUTION INTRAVENOUS at 04:27

## 2022-01-01 RX ADMIN — PANTOPRAZOLE SODIUM 40 MG: 40 INJECTION, POWDER, FOR SOLUTION INTRAVENOUS at 08:33

## 2022-01-01 RX ADMIN — CEFAZOLIN SODIUM 2 G: 2 INJECTION, SOLUTION INTRAVENOUS at 05:00

## 2022-01-01 RX ADMIN — SENNOSIDES AND DOCUSATE SODIUM 2 TABLET: 50; 8.6 TABLET ORAL at 08:40

## 2022-01-01 RX ADMIN — Medication 2 G: at 14:04

## 2022-01-01 RX ADMIN — SENNOSIDES AND DOCUSATE SODIUM 1 TABLET: 50; 8.6 TABLET ORAL at 08:19

## 2022-01-01 RX ADMIN — LORAZEPAM 1 MG: 1 TABLET ORAL at 11:46

## 2022-01-01 RX ADMIN — SODIUM CHLORIDE: 9 INJECTION, SOLUTION INTRAVENOUS at 10:47

## 2022-01-01 RX ADMIN — HYDROMORPHONE HYDROCHLORIDE 0.2 MG: 0.2 INJECTION, SOLUTION INTRAMUSCULAR; INTRAVENOUS; SUBCUTANEOUS at 02:42

## 2022-01-01 RX ADMIN — ASPIRIN 81 MG: 81 TABLET, COATED ORAL at 08:24

## 2022-01-01 RX ADMIN — SODIUM CHLORIDE, POTASSIUM CHLORIDE, SODIUM LACTATE AND CALCIUM CHLORIDE: 600; 310; 30; 20 INJECTION, SOLUTION INTRAVENOUS at 15:27

## 2022-01-01 RX ADMIN — SODIUM CHLORIDE: 9 INJECTION, SOLUTION INTRAVENOUS at 15:16

## 2022-01-01 RX ADMIN — VANCOMYCIN HYDROCHLORIDE 500 MG: 500 INJECTION, POWDER, LYOPHILIZED, FOR SOLUTION INTRAVENOUS at 08:15

## 2022-01-01 RX ADMIN — HYDROXYZINE HYDROCHLORIDE 25 MG: 25 TABLET, FILM COATED ORAL at 08:38

## 2022-01-01 RX ADMIN — AMIODARONE HYDROCHLORIDE 200 MG: 200 TABLET ORAL at 08:05

## 2022-01-01 RX ADMIN — PHENYLEPHRINE HYDROCHLORIDE 50 MCG: 10 INJECTION INTRAVENOUS at 14:33

## 2022-01-01 RX ADMIN — HYDROMORPHONE HYDROCHLORIDE 1 MG: 2 TABLET ORAL at 11:46

## 2022-01-01 RX ADMIN — ROSUVASTATIN CALCIUM 20 MG: 20 TABLET, FILM COATED ORAL at 08:38

## 2022-01-01 RX ADMIN — CEFAZOLIN SODIUM 2 G: 2 INJECTION, SOLUTION INTRAVENOUS at 21:56

## 2022-01-01 RX ADMIN — ROCURONIUM BROMIDE 5 MG: 50 INJECTION, SOLUTION INTRAVENOUS at 11:18

## 2022-01-01 RX ADMIN — CYCLOBENZAPRINE HYDROCHLORIDE 10 MG: 5 TABLET, FILM COATED ORAL at 19:06

## 2022-01-01 RX ADMIN — FENTANYL CITRATE 25 MCG: 50 INJECTION, SOLUTION INTRAMUSCULAR; INTRAVENOUS at 13:15

## 2022-01-01 RX ADMIN — HYDROXYZINE HYDROCHLORIDE 25 MG: 25 TABLET, FILM COATED ORAL at 21:45

## 2022-01-01 RX ADMIN — HYDROMORPHONE HYDROCHLORIDE 0.2 MG: 0.2 INJECTION, SOLUTION INTRAMUSCULAR; INTRAVENOUS; SUBCUTANEOUS at 18:12

## 2022-01-01 RX ADMIN — Medication 2 G: at 11:21

## 2022-01-01 RX ADMIN — CALCIUM CARBONATE (ANTACID) CHEW TAB 500 MG 500 MG: 500 CHEW TAB at 21:55

## 2022-01-01 RX ADMIN — FUROSEMIDE 40 MG: 10 INJECTION, SOLUTION INTRAVENOUS at 08:21

## 2022-01-01 RX ADMIN — IOPAMIDOL 65 ML: 755 INJECTION, SOLUTION INTRAVENOUS at 14:23

## 2022-01-01 RX ADMIN — SENNOSIDES AND DOCUSATE SODIUM 1 TABLET: 50; 8.6 TABLET ORAL at 20:09

## 2022-01-01 RX ADMIN — LIDOCAINE HYDROCHLORIDE 1.5 ML: 40 SOLUTION TOPICAL at 15:15

## 2022-01-01 RX ADMIN — OXYCODONE HYDROCHLORIDE 5 MG: 5 TABLET ORAL at 17:57

## 2022-01-01 RX ADMIN — FENTANYL CITRATE 25 MCG: 50 INJECTION, SOLUTION INTRAMUSCULAR; INTRAVENOUS at 16:46

## 2022-01-01 RX ADMIN — MORPHINE SULFATE 10 MG: 10 SOLUTION ORAL at 06:11

## 2022-01-01 RX ADMIN — SUGAMMADEX 200 MG: 100 INJECTION, SOLUTION INTRAVENOUS at 14:59

## 2022-01-01 RX ADMIN — HYDROMORPHONE HYDROCHLORIDE 0.4 MG: 0.2 INJECTION, SOLUTION INTRAMUSCULAR; INTRAVENOUS; SUBCUTANEOUS at 17:34

## 2022-01-01 RX ADMIN — HALOPERIDOL 1 MG: 2 SOLUTION ORAL at 12:36

## 2022-01-01 RX ADMIN — LORAZEPAM 1 MG: 1 TABLET ORAL at 02:07

## 2022-01-01 RX ADMIN — ACETAMINOPHEN 650 MG: 325 TABLET ORAL at 09:42

## 2022-01-01 RX ADMIN — HALOPERIDOL 1 MG: 2 SOLUTION ORAL at 12:01

## 2022-01-01 RX ADMIN — AZITHROMYCIN MONOHYDRATE 500 MG: 500 INJECTION, POWDER, LYOPHILIZED, FOR SOLUTION INTRAVENOUS at 18:16

## 2022-01-01 RX ADMIN — METOPROLOL TARTRATE 5 MG: 5 INJECTION INTRAVENOUS at 05:13

## 2022-01-01 RX ADMIN — HYDROMORPHONE HYDROCHLORIDE 0.2 MG: 0.2 INJECTION, SOLUTION INTRAMUSCULAR; INTRAVENOUS; SUBCUTANEOUS at 15:25

## 2022-01-01 RX ADMIN — CEFAZOLIN SODIUM 2 G: 2 INJECTION, SOLUTION INTRAVENOUS at 12:36

## 2022-01-01 RX ADMIN — ACETAMINOPHEN 650 MG: 325 TABLET ORAL at 08:38

## 2022-01-01 RX ADMIN — PHENYLEPHRINE HYDROCHLORIDE 100 MCG: 10 INJECTION INTRAVENOUS at 11:32

## 2022-01-01 RX ADMIN — HYDROMORPHONE HYDROCHLORIDE 0.3 MG: 1 INJECTION, SOLUTION INTRAMUSCULAR; INTRAVENOUS; SUBCUTANEOUS at 14:16

## 2022-01-01 RX ADMIN — OXYCODONE HYDROCHLORIDE 5 MG: 5 TABLET ORAL at 09:17

## 2022-01-01 RX ADMIN — SENNOSIDES AND DOCUSATE SODIUM 1 TABLET: 50; 8.6 TABLET ORAL at 21:01

## 2022-01-01 RX ADMIN — ASPIRIN 81 MG: 81 TABLET, COATED ORAL at 09:16

## 2022-01-01 RX ADMIN — FENTANYL CITRATE 25 MCG: 50 INJECTION, SOLUTION INTRAMUSCULAR; INTRAVENOUS at 15:29

## 2022-01-01 RX ADMIN — SODIUM CHLORIDE: 9 INJECTION, SOLUTION INTRAVENOUS at 15:59

## 2022-01-01 RX ADMIN — ROSUVASTATIN CALCIUM 20 MG: 20 TABLET, FILM COATED ORAL at 14:32

## 2022-01-01 RX ADMIN — ACETAMINOPHEN 975 MG: 325 TABLET ORAL at 20:09

## 2022-01-01 RX ADMIN — HYDROMORPHONE HYDROCHLORIDE 1 MG: 2 TABLET ORAL at 10:36

## 2022-01-01 RX ADMIN — ACETAMINOPHEN 975 MG: 325 TABLET ORAL at 22:14

## 2022-01-01 RX ADMIN — ACETAMINOPHEN 975 MG: 325 TABLET ORAL at 16:22

## 2022-01-01 RX ADMIN — PANTOPRAZOLE SODIUM 40 MG: 40 INJECTION, POWDER, FOR SOLUTION INTRAVENOUS at 08:03

## 2022-01-01 RX ADMIN — CALCIUM CARBONATE (ANTACID) CHEW TAB 500 MG 500 MG: 500 CHEW TAB at 02:50

## 2022-01-01 RX ADMIN — CEFAZOLIN SODIUM 2 G: 2 INJECTION, SOLUTION INTRAVENOUS at 05:51

## 2022-01-01 RX ADMIN — DEXAMETHASONE SODIUM PHOSPHATE 4 MG: 4 INJECTION, SOLUTION INTRA-ARTICULAR; INTRALESIONAL; INTRAMUSCULAR; INTRAVENOUS; SOFT TISSUE at 14:26

## 2022-01-01 RX ADMIN — FENTANYL CITRATE 50 MCG: 50 INJECTION, SOLUTION INTRAMUSCULAR; INTRAVENOUS at 12:00

## 2022-01-01 RX ADMIN — DIGOXIN 250 MCG: 0.25 INJECTION INTRAMUSCULAR; INTRAVENOUS at 17:53

## 2022-01-01 RX ADMIN — ASPIRIN 81 MG: 81 TABLET, COATED ORAL at 21:56

## 2022-01-01 RX ADMIN — ASPIRIN 81 MG: 81 TABLET, COATED ORAL at 08:03

## 2022-01-01 RX ADMIN — HYDROMORPHONE HYDROCHLORIDE 2 MG: 2 TABLET ORAL at 13:28

## 2022-01-01 RX ADMIN — LIDOCAINE HYDROCHLORIDE 60 MG: 20 INJECTION, SOLUTION INFILTRATION; PERINEURAL at 13:58

## 2022-01-01 RX ADMIN — CEFAZOLIN SODIUM 2 G: 2 INJECTION, SOLUTION INTRAVENOUS at 21:06

## 2022-01-01 RX ADMIN — METOPROLOL TARTRATE 5 MG: 5 INJECTION INTRAVENOUS at 21:58

## 2022-01-01 RX ADMIN — ROCURONIUM BROMIDE 10 MG: 50 INJECTION, SOLUTION INTRAVENOUS at 14:29

## 2022-01-01 RX ADMIN — HYDROMORPHONE HYDROCHLORIDE 0.2 MG: 0.2 INJECTION, SOLUTION INTRAMUSCULAR; INTRAVENOUS; SUBCUTANEOUS at 19:01

## 2022-01-01 RX ADMIN — SODIUM CHLORIDE 770 ML: 9 INJECTION, SOLUTION INTRAVENOUS at 13:16

## 2022-01-01 RX ADMIN — AMIODARONE HYDROCHLORIDE 200 MG: 200 TABLET ORAL at 20:56

## 2022-01-01 RX ADMIN — SENNOSIDES AND DOCUSATE SODIUM 1 TABLET: 50; 8.6 TABLET ORAL at 08:09

## 2022-01-01 RX ADMIN — PHENYLEPHRINE HYDROCHLORIDE 0.5 MCG/KG/MIN: 10 INJECTION INTRAVENOUS at 14:03

## 2022-01-01 RX ADMIN — PROPOFOL 150 MG: 10 INJECTION, EMULSION INTRAVENOUS at 13:58

## 2022-01-01 RX ADMIN — ASPIRIN 81 MG: 81 TABLET, COATED ORAL at 20:49

## 2022-01-01 RX ADMIN — SODIUM CHLORIDE 84 ML: 9 INJECTION, SOLUTION INTRAVENOUS at 18:45

## 2022-01-01 RX ADMIN — MORPHINE SULFATE 10 MG: 10 SOLUTION ORAL at 13:04

## 2022-01-01 RX ADMIN — SODIUM CHLORIDE 1000 ML: 9 INJECTION, SOLUTION INTRAVENOUS at 15:17

## 2022-01-01 RX ADMIN — ACETAMINOPHEN 1000 MG: 500 TABLET, FILM COATED ORAL at 16:13

## 2022-01-01 RX ADMIN — METOPROLOL TARTRATE 5 MG: 5 INJECTION INTRAVENOUS at 04:20

## 2022-01-01 RX ADMIN — FENTANYL CITRATE 25 MCG: 50 INJECTION, SOLUTION INTRAMUSCULAR; INTRAVENOUS at 15:45

## 2022-01-01 RX ADMIN — SODIUM CHLORIDE, POTASSIUM CHLORIDE, SODIUM LACTATE AND CALCIUM CHLORIDE: 600; 310; 30; 20 INJECTION, SOLUTION INTRAVENOUS at 16:06

## 2022-01-01 RX ADMIN — ACETAMINOPHEN 975 MG: 325 TABLET ORAL at 21:07

## 2022-01-01 RX ADMIN — HYDROMORPHONE HYDROCHLORIDE 0.2 MG: 0.2 INJECTION, SOLUTION INTRAMUSCULAR; INTRAVENOUS; SUBCUTANEOUS at 14:02

## 2022-01-01 RX ADMIN — OXYCODONE HYDROCHLORIDE 5 MG: 5 TABLET ORAL at 03:41

## 2022-01-01 RX ADMIN — OXYCODONE HYDROCHLORIDE 5 MG: 5 TABLET ORAL at 04:18

## 2022-01-01 RX ADMIN — SENNOSIDES AND DOCUSATE SODIUM 1 TABLET: 50; 8.6 TABLET ORAL at 21:56

## 2022-01-01 RX ADMIN — ONDANSETRON 4 MG: 4 TABLET, ORALLY DISINTEGRATING ORAL at 21:02

## 2022-01-01 RX ADMIN — MORPHINE SULFATE 10 MG: 10 SOLUTION ORAL at 22:27

## 2022-01-01 RX ADMIN — ASPIRIN 81 MG: 81 TABLET, COATED ORAL at 08:34

## 2022-01-01 RX ADMIN — ASPIRIN 81 MG: 81 TABLET, COATED ORAL at 08:19

## 2022-01-01 RX ADMIN — OXYCODONE HYDROCHLORIDE 5 MG: 5 TABLET ORAL at 04:33

## 2022-01-01 RX ADMIN — ATROPINE SULFATE 4 DROP: 10 SOLUTION OPHTHALMIC at 14:18

## 2022-01-01 RX ADMIN — ASPIRIN 81 MG: 81 TABLET, COATED ORAL at 21:07

## 2022-01-01 RX ADMIN — PROPOFOL 150 MG: 10 INJECTION, EMULSION INTRAVENOUS at 11:18

## 2022-01-01 RX ADMIN — FUROSEMIDE 20 MG: 10 INJECTION, SOLUTION INTRAVENOUS at 01:21

## 2022-01-01 RX ADMIN — ACETAMINOPHEN 650 MG: 325 TABLET ORAL at 13:08

## 2022-01-01 RX ADMIN — ACETAMINOPHEN 650 MG: 325 TABLET ORAL at 13:09

## 2022-01-01 RX ADMIN — ACETAMINOPHEN 650 MG: 325 TABLET ORAL at 08:36

## 2022-01-01 RX ADMIN — MORPHINE SULFATE 10 MG: 10 SOLUTION ORAL at 13:17

## 2022-01-01 RX ADMIN — HYDROMORPHONE HYDROCHLORIDE 0.4 MG: 0.2 INJECTION, SOLUTION INTRAMUSCULAR; INTRAVENOUS; SUBCUTANEOUS at 08:28

## 2022-01-01 RX ADMIN — CEFAZOLIN SODIUM 2 G: 2 INJECTION, SOLUTION INTRAVENOUS at 19:43

## 2022-01-01 RX ADMIN — ASPIRIN 81 MG: 81 TABLET, COATED ORAL at 08:31

## 2022-01-01 RX ADMIN — MORPHINE SULFATE 10 MG: 10 SOLUTION ORAL at 16:35

## 2022-01-01 RX ADMIN — OXYCODONE HYDROCHLORIDE 5 MG: 5 TABLET ORAL at 11:55

## 2022-01-01 RX ADMIN — PHENYLEPHRINE HYDROCHLORIDE 200 MCG: 10 INJECTION INTRAVENOUS at 11:22

## 2022-01-01 RX ADMIN — PHENYLEPHRINE HYDROCHLORIDE 0.5 MCG/KG/MIN: 10 INJECTION INTRAVENOUS at 11:35

## 2022-01-01 ASSESSMENT — ACTIVITIES OF DAILY LIVING (ADL)
ADLS_ACUITY_SCORE: 16
ADLS_ACUITY_SCORE: 12
ADLS_ACUITY_SCORE: 7
ADLS_ACUITY_SCORE: 5
ADLS_ACUITY_SCORE: 7
ADLS_ACUITY_SCORE: 12
ADLS_ACUITY_SCORE: 5
ADLS_ACUITY_SCORE: 9
ADLS_ACUITY_SCORE: 5
ADLS_ACUITY_SCORE: 12
ADLS_ACUITY_SCORE: 7
ADLS_ACUITY_SCORE: 12
ADLS_ACUITY_SCORE: 9
ADLS_ACUITY_SCORE: 14
ADLS_ACUITY_SCORE: 12
ADLS_ACUITY_SCORE: 10
ADLS_ACUITY_SCORE: 14
ADLS_ACUITY_SCORE: 9
ADLS_ACUITY_SCORE: 16
ADLS_ACUITY_SCORE: 7
ADLS_ACUITY_SCORE: 9
ADLS_ACUITY_SCORE: 16
ADLS_ACUITY_SCORE: 9
ADLS_ACUITY_SCORE: 7
ADLS_ACUITY_SCORE: 16
ADLS_ACUITY_SCORE: 7
ADLS_ACUITY_SCORE: 9
ADLS_ACUITY_SCORE: 7
ADLS_ACUITY_SCORE: 12
ADLS_ACUITY_SCORE: 7
ADLS_ACUITY_SCORE: 4
ADLS_ACUITY_SCORE: 12
ADLS_ACUITY_SCORE: 12
ADLS_ACUITY_SCORE: 5
ADLS_ACUITY_SCORE: 12
ADLS_ACUITY_SCORE: 12
ADLS_ACUITY_SCORE: 10
ADLS_ACUITY_SCORE: 5
ADLS_ACUITY_SCORE: 4
ADLS_ACUITY_SCORE: 9
ADLS_ACUITY_SCORE: 16
ADLS_ACUITY_SCORE: 5
ADLS_ACUITY_SCORE: 5
ADLS_ACUITY_SCORE: 16
ADLS_ACUITY_SCORE: 9
ADLS_ACUITY_SCORE: 9
ADLS_ACUITY_SCORE: 12
ADLS_ACUITY_SCORE: 12
ADLS_ACUITY_SCORE: 9
ADLS_ACUITY_SCORE: 12
ADLS_ACUITY_SCORE: 7
ADLS_ACUITY_SCORE: 16
ADLS_ACUITY_SCORE: 9
ADLS_ACUITY_SCORE: 5
ADLS_ACUITY_SCORE: 9
ADLS_ACUITY_SCORE: 5
ADLS_ACUITY_SCORE: 16
ADLS_ACUITY_SCORE: 12
ADLS_ACUITY_SCORE: 5
ADLS_ACUITY_SCORE: 9
ADLS_ACUITY_SCORE: 5
ADLS_ACUITY_SCORE: 9
ADLS_ACUITY_SCORE: 7
ADLS_ACUITY_SCORE: 9
ADLS_ACUITY_SCORE: 12
ADLS_ACUITY_SCORE: 5
ADLS_ACUITY_SCORE: 16
ADLS_ACUITY_SCORE: 9
ADLS_ACUITY_SCORE: 9
ADLS_ACUITY_SCORE: 16
ADLS_ACUITY_SCORE: 12
ADLS_ACUITY_SCORE: 9
ADLS_ACUITY_SCORE: 9
ADLS_ACUITY_SCORE: 12
ADLS_ACUITY_SCORE: 9
ADLS_ACUITY_SCORE: 14
ADLS_ACUITY_SCORE: 4
ADLS_ACUITY_SCORE: 10
ADLS_ACUITY_SCORE: 12
ADLS_ACUITY_SCORE: 14
ADLS_ACUITY_SCORE: 7
ADLS_ACUITY_SCORE: 9
ADLS_ACUITY_SCORE: 7
ADLS_ACUITY_SCORE: 9
ADLS_ACUITY_SCORE: 12
ADLS_ACUITY_SCORE: 12
ADLS_ACUITY_SCORE: 7
ADLS_ACUITY_SCORE: 9
ADLS_ACUITY_SCORE: 4
ADLS_ACUITY_SCORE: 16
ADLS_ACUITY_SCORE: 9
ADLS_ACUITY_SCORE: 10
ADLS_ACUITY_SCORE: 5
ADLS_ACUITY_SCORE: 5
ADLS_ACUITY_SCORE: 12
ADLS_ACUITY_SCORE: 16
ADLS_ACUITY_SCORE: 16
ADLS_ACUITY_SCORE: 5
ADLS_ACUITY_SCORE: 9
ADLS_ACUITY_SCORE: 9
ADLS_ACUITY_SCORE: 12
ADLS_ACUITY_SCORE: 5
ADLS_ACUITY_SCORE: 16
ADLS_ACUITY_SCORE: 7
ADLS_ACUITY_SCORE: 5
ADLS_ACUITY_SCORE: 9
ADLS_ACUITY_SCORE: 16
ADLS_ACUITY_SCORE: 4
ADLS_ACUITY_SCORE: 16
ADLS_ACUITY_SCORE: 7
ADLS_ACUITY_SCORE: 9
ADLS_ACUITY_SCORE: 5
ADLS_ACUITY_SCORE: 9
ADLS_ACUITY_SCORE: 9
ADLS_ACUITY_SCORE: 5
ADLS_ACUITY_SCORE: 9
ADLS_ACUITY_SCORE: 9
ADLS_ACUITY_SCORE: 16
ADLS_ACUITY_SCORE: 16
ADLS_ACUITY_SCORE: 9
ADLS_ACUITY_SCORE: 9
ADLS_ACUITY_SCORE: 7
ADLS_ACUITY_SCORE: 12
ADLS_ACUITY_SCORE: 5
ADLS_ACUITY_SCORE: 12
ADLS_ACUITY_SCORE: 12
ADLS_ACUITY_SCORE: 7
ADLS_ACUITY_SCORE: 12
ADLS_ACUITY_SCORE: 16
ADLS_ACUITY_SCORE: 12
ADLS_ACUITY_SCORE: 12
ADLS_ACUITY_SCORE: 5
ADLS_ACUITY_SCORE: 16
ADLS_ACUITY_SCORE: 4
ADLS_ACUITY_SCORE: 10
ADLS_ACUITY_SCORE: 9
ADLS_ACUITY_SCORE: 12
ADLS_ACUITY_SCORE: 7
ADLS_ACUITY_SCORE: 12
ADLS_ACUITY_SCORE: 16
ADLS_ACUITY_SCORE: 7
ADLS_ACUITY_SCORE: 5
ADLS_ACUITY_SCORE: 9
ADLS_ACUITY_SCORE: 12
ADLS_ACUITY_SCORE: 12
ADLS_ACUITY_SCORE: 7
ADLS_ACUITY_SCORE: 5
ADLS_ACUITY_SCORE: 9
ADLS_ACUITY_SCORE: 12
ADLS_ACUITY_SCORE: 12
ADLS_ACUITY_SCORE: 9
ADLS_ACUITY_SCORE: 9
ADLS_ACUITY_SCORE: 12
ADLS_ACUITY_SCORE: 10
ADLS_ACUITY_SCORE: 5
ADLS_ACUITY_SCORE: 9
ADLS_ACUITY_SCORE: 4
ADLS_ACUITY_SCORE: 7
ADLS_ACUITY_SCORE: 9
ADLS_ACUITY_SCORE: 9
ADLS_ACUITY_SCORE: 5
ADLS_ACUITY_SCORE: 12
ADLS_ACUITY_SCORE: 9
ADLS_ACUITY_SCORE: 5
ADLS_ACUITY_SCORE: 9
ADLS_ACUITY_SCORE: 12
ADLS_ACUITY_SCORE: 9
ADLS_ACUITY_SCORE: 5
ADLS_ACUITY_SCORE: 9
ADLS_ACUITY_SCORE: 9
ADLS_ACUITY_SCORE: 5
ADLS_ACUITY_SCORE: 9
ADLS_ACUITY_SCORE: 5
ADLS_ACUITY_SCORE: 9
ADLS_ACUITY_SCORE: 10
ADLS_ACUITY_SCORE: 9
ADLS_ACUITY_SCORE: 9
ADLS_ACUITY_SCORE: 16
ADLS_ACUITY_SCORE: 7
ADLS_ACUITY_SCORE: 12
ADLS_ACUITY_SCORE: 9
ADLS_ACUITY_SCORE: 9
ADLS_ACUITY_SCORE: 12
ADLS_ACUITY_SCORE: 9
ADLS_ACUITY_SCORE: 7
ADLS_ACUITY_SCORE: 16
ADLS_ACUITY_SCORE: 5
ADLS_ACUITY_SCORE: 9
ADLS_ACUITY_SCORE: 7
ADLS_ACUITY_SCORE: 7
ADLS_ACUITY_SCORE: 9
ADLS_ACUITY_SCORE: 16
ADLS_ACUITY_SCORE: 12
ADLS_ACUITY_SCORE: 10
ADLS_ACUITY_SCORE: 9
ADLS_ACUITY_SCORE: 16
ADLS_ACUITY_SCORE: 12
ADLS_ACUITY_SCORE: 10
ADLS_ACUITY_SCORE: 5
ADLS_ACUITY_SCORE: 12
ADLS_ACUITY_SCORE: 12
ADLS_ACUITY_SCORE: 9
ADLS_ACUITY_SCORE: 16
ADLS_ACUITY_SCORE: 9
ADLS_ACUITY_SCORE: 16
ADLS_ACUITY_SCORE: 16
ADLS_ACUITY_SCORE: 10
ADLS_ACUITY_SCORE: 16
ADLS_ACUITY_SCORE: 12
ADLS_ACUITY_SCORE: 9
ADLS_ACUITY_SCORE: 5
ADLS_ACUITY_SCORE: 9
ADLS_ACUITY_SCORE: 16
ADLS_ACUITY_SCORE: 12
ADLS_ACUITY_SCORE: 5
ADLS_ACUITY_SCORE: 9
ADLS_ACUITY_SCORE: 12
ADLS_ACUITY_SCORE: 16
ADLS_ACUITY_SCORE: 9
ADLS_ACUITY_SCORE: 5
ADLS_ACUITY_SCORE: 7
ADLS_ACUITY_SCORE: 16
ADLS_ACUITY_SCORE: 9
ADLS_ACUITY_SCORE: 12
ADLS_ACUITY_SCORE: 9
ADLS_ACUITY_SCORE: 10
ADLS_ACUITY_SCORE: 16
ADLS_ACUITY_SCORE: 14
ADLS_ACUITY_SCORE: 9
ADLS_ACUITY_SCORE: 16
ADLS_ACUITY_SCORE: 9
ADLS_ACUITY_SCORE: 16
ADLS_ACUITY_SCORE: 9
ADLS_ACUITY_SCORE: 5
ADLS_ACUITY_SCORE: 9
ADLS_ACUITY_SCORE: 9
ADLS_ACUITY_SCORE: 4
ADLS_ACUITY_SCORE: 10
ADLS_ACUITY_SCORE: 9
ADLS_ACUITY_SCORE: 10
ADLS_ACUITY_SCORE: 16
ADLS_ACUITY_SCORE: 9
ADLS_ACUITY_SCORE: 16
ADLS_ACUITY_SCORE: 16
ADLS_ACUITY_SCORE: 5
ADLS_ACUITY_SCORE: 10
ADLS_ACUITY_SCORE: 12
ADLS_ACUITY_SCORE: 10
ADLS_ACUITY_SCORE: 9
ADLS_ACUITY_SCORE: 16
ADLS_ACUITY_SCORE: 7
ADLS_ACUITY_SCORE: 9
ADLS_ACUITY_SCORE: 12
ADLS_ACUITY_SCORE: 16
ADLS_ACUITY_SCORE: 7
ADLS_ACUITY_SCORE: 9
ADLS_ACUITY_SCORE: 10
ADLS_ACUITY_SCORE: 9
ADLS_ACUITY_SCORE: 12
ADLS_ACUITY_SCORE: 7
ADLS_ACUITY_SCORE: 4
ADLS_ACUITY_SCORE: 16
ADLS_ACUITY_SCORE: 14
ADLS_ACUITY_SCORE: 12
ADLS_ACUITY_SCORE: 12
ADLS_ACUITY_SCORE: 5
ADLS_ACUITY_SCORE: 10
ADLS_ACUITY_SCORE: 16
ADLS_ACUITY_SCORE: 16
ADLS_ACUITY_SCORE: 9
ADLS_ACUITY_SCORE: 16
ADLS_ACUITY_SCORE: 12
ADLS_ACUITY_SCORE: 9
ADLS_ACUITY_SCORE: 10
ADLS_ACUITY_SCORE: 7
ADLS_ACUITY_SCORE: 9
ADLS_ACUITY_SCORE: 9
ADLS_ACUITY_SCORE: 12
ADLS_ACUITY_SCORE: 16
ADLS_ACUITY_SCORE: 7
ADLS_ACUITY_SCORE: 16
ADLS_ACUITY_SCORE: 16
ADLS_ACUITY_SCORE: 9
ADLS_ACUITY_SCORE: 9

## 2022-01-01 ASSESSMENT — ENCOUNTER SYMPTOMS
ARTHRALGIAS: 1
NAUSEA: 0
NAUSEA: 1
DIARRHEA: 0
SHORTNESS OF BREATH: 0
NUMBNESS: 0
DIARRHEA: 1
ABDOMINAL PAIN: 0
VOMITING: 1
DYSURIA: 0
ABDOMINAL PAIN: 0
FEVER: 0
VOMITING: 0
MYALGIAS: 1

## 2022-04-22 NOTE — ED PROVIDER NOTES
History   Chief Complaint:  Shoulder Pain    HPI   Tamar Rose is a right hand dominant 87 year old female with history of colon cancer who presents with severe right shoulder pain that started 3 days ago. The patient reports that she received bilateral shoulder cortisone injections at [facility] for extensive torn rotator cuffs. She notes that she did not have pain at that time. However, the next day she developed severe right shoulder pain, worse last night. The patient's daughter states that they went on a long shopping trip after this and her mother was using a short walker at this time. Notes that her left shoulder feels fine. She tried Tylenol, ibuprofen, and Excedrin. Last night she tried her son-in-laws arthritis medication. Mentions that she has had 4 cortisone injections prior to this but her daughter reports that she has been receiving them for years. Denies fever, rash, nausea, vomiting, diarrhea, abdominal or chest pain. No numbness or tingling. She denies recent falls or injury.     Review of Systems   Constitutional: Negative for fever.   Cardiovascular: Negative for chest pain.   Gastrointestinal: Negative for abdominal pain, diarrhea, nausea and vomiting.   Musculoskeletal: Positive for arthralgias.   Skin: Negative for rash.   Neurological: Negative for numbness.   All other systems reviewed and are negative.    Allergies:  Sulfa     Medications:  Crestor    Past Medical History:     Closed compression fracture of L5  Hyperlipidemia  Torticollis  Osteoporosis  Colitis  Colon cancer    Past Surgical History:    Hysterectomy  Appendectomy  Colonoscopy x3  Upper GI endoscopy  Right hemicolectomy  Vertebroplasty    Family History:    Sister: Breast cancer    Social History:  The patient presents to the ED with daughter.    Physical Exam     Patient Vitals for the past 24 hrs:   BP Temp Temp src Pulse Resp SpO2 Height Weight   04/22/22 0638 129/65 98.1  F (36.7  C) Temporal 88 20 95 % 1.549 m  "(5' 1\") 58.1 kg (128 lb)       Physical Exam  Physical Exam   General:  Sitting on bed with her daughter at bedside, comfortable appearing.   HENT:  No obvious trauma to head  Right Ear:  External ear normal.   Left Ear:  External ear normal.   Nose:  Nose normal.   Eyes:  Conjunctivae and EOM are normal.  Neck: Normal range of motion. Neck supple. No tracheal deviation present.   Pulm/Chest: No respiratory distress.  M/S: Tenderness to the right proximal humerus. Range of motion difficult secondary to pain. No erythema or warmth. Right radial pulse +2. No pain to elbow, forearm, or wrist.   Neuro: Alert. GCS 15.  Skin: Skin is warm and dry. No rash noted. Not diaphoretic.   Psych: Normal mood and affect. Behavior is normal.     Emergency Department Course     Imaging:  XR Shoulder Right G/E 3 Views   Preliminary Result   IMPRESSION: Moderate to severe osteoarthrosis of the glenohumeral   joint. Small amount of calcification in the   supraspinatus/infraspinatus tendons consistent with calcific   tendinosis. Otherwise negative.        Report per radiology    Emergency Department Course:  Reviewed:  I reviewed nursing notes, vitals and past medical history    Assessments:  0651 I obtained history and examined the patient as noted above.     0738 I rechecked the patient and explained findings.     Interventions:  0704 Percocet 1 tablet PO    Disposition:  The patient was discharged to home.     Impression & Plan   Medical Decision Making:  Tamar Rose is a very pleasant 87 year old year old patient who presents to the emergency department with concern of right dominant upper extremity shoulder pain.  This has been ongoing issue for years.  She had a steroid injection few days ago.  She felt much better immediately after that and went shopping using a walker for an hour and a half at the mall.  The pain has been worse since then.  She has tried over-the-counter pain medicines to no avail.  An x-ray is obtained and " shows no evidence of dislocation or acute fracture.  There is notable amount of arthritis.  There is no erythema or warmth to the joint or fever here to suggest septic joint.  No chest pain or shortness of breath to suggest referred pain to the shoulder from the intrathoracic process such as pneumothorax, cardiac concern, or referred pain from an intra-abdominal process such as cholelithiasis/cholecystitis.  She is here with her daughter.  She was provided 1 Percocet.  I prescribed a limited amount of these. Discussed the risk and benefits of using a narcotic pain medication and that it can cause sedation and constipation. The pt expressed verbal understanding and agrees not to drive or operate heavy machinery or use it where her judgement would be compromised by these medications.  I discussed narcotics are not a good long-term solution for shoulder pain.  I recommended she follow-up with the orthopedic surgeon.  She was placed in an arm sling.  She and her daughter agree.    The treatment plan was discussed with the patient and they expressed understanding of this plan and consented to the plan.  In addition, the patient will return to the emergency department if their symptoms persist, worsen, if new symptoms arise or if there is any concern as other pathology may be present that is not evident at this time. They also understand the importance of close follow up in the clinic and if unable to do so will return to the emergency department for a reevaluation. All questions were answered.    Diagnosis:    ICD-10-CM    1. Acute pain of right shoulder  M25.511      Discharge Medications:  New Prescriptions    OXYCODONE-ACETAMINOPHEN (PERCOCET) 5-325 MG TABLET    Take 0.5-1 tablets by mouth every 6 hours as needed for pain       Scribe Disclosure:  Finesse GILL, am serving as a scribe at 6:43 AM on 4/22/2022 to document services personally performed by Florentino Monson DO based on my observations and the  provider's statements to me.            Florentino Monson, DO  04/22/22 0752

## 2022-04-22 NOTE — ED TRIAGE NOTES
Right shoulder pain that prevented her from sleep since last night after receiving a cortisone shot at [facility] on Tuesday.

## 2022-04-22 NOTE — ED NOTES
Discharge instructions given, questions encouraged and answered, opioid safety reviewed, patient being wheeled to car and daughter driving home.

## 2022-04-22 NOTE — ED NOTES
Agree with triage note.   Patient states she received bilateral cortisone shots for torn rotator cuffs on Tuesday. Pain in R shoulder started Wednesday night, continuing into Thursday and today. Has tried tylenol, ibuprofen, Excedrin with no relief.   Denies numbness/tingling.

## 2022-04-24 PROBLEM — M00.9 PYOGENIC ARTHRITIS OF RIGHT SHOULDER REGION, DUE TO UNSPECIFIED ORGANISM (H): Status: ACTIVE | Noted: 2022-01-01

## 2022-04-24 NOTE — PHARMACY-ADMISSION MEDICATION HISTORY
Pharmacy Medication History  Admission medication history interview status for the 4/24/2022  admission is complete. See EPIC admission navigator for prior to admission medications     Location of Interview: Patient room  Medication history sources: Patient, Patient's family/friend (daughter), Surescripts and Care Everywhere    Significant changes made to the medication list:  Added:  - methylprednisolone  - ibuprofen (prn)    In the past week, patient estimated taking medication this percent of the time: greater than 90%    Additional medication history information:   None    Medication reconciliation completed by provider prior to medication history? No    Time spent in this activity: 10 minutes    Prior to Admission medications    Medication Sig Last Dose Taking? Auth Provider   ibuprofen (ADVIL/MOTRIN) 200 MG tablet Take 200 mg by mouth every 4 hours as needed for mild pain  at prn Yes Unknown, Entered By History   methylPREDNISolone (MEDROL DOSEPAK) 4 MG tablet therapy pack Take by mouth See Admin Instructions Follow Package Directions 4/24/2022 at AM Yes Unknown, Entered By History   oxyCODONE-acetaminophen (PERCOCET) 5-325 MG tablet Take 0.5-1 tablets by mouth every 6 hours as needed for pain 4/24/2022 at 0200 Yes Florentino Monson,    rosuvastatin (CRESTOR) 20 MG tablet Take 20 mg by mouth daily 4/23/2022 at AM Yes Reported, Patient       The information provided in this note is only as accurate as the sources available at the time of update(s)

## 2022-04-24 NOTE — ED TRIAGE NOTES
Pt lives with daughter, here for N/V/D. Weak, has right shoulder pain and on prednisone and oxy. Pt. Given 4 zofran, 2.5 of droperidol in rig. Glucose 191. 18g in place.

## 2022-04-24 NOTE — ED PROVIDER NOTES
History   Chief Complaint:  Nausea, Vomiting, & Diarrhea       The history is provided by the patient. The history is limited by the condition of the patient.      Tamar Rose is a 87 year old female with history of colon cancer, hyperlipidemia, and osteoporosis who presents with nausea, vomiting, and diarrhea. The patient reports that she began experiencing nausea, vomiting, and diarrhea last night. She also endorses right shoulder pain for the past 4-5 days. She also endorses dry mouth. She was reportedly seen by a provider for this pain and is currently taking Oxycodone for pain with no relief (see shoulder x-ray below). Per chart review, she has a history of rectal cancer. She is reportedly on Crestor and Prolia. She reports no known sick contacts. At this time, she denies shortness of breath or chest pain. She also reports no abdominal pain or dysuria.     Imaging from [facility] on 4/22/22   XR Right Shoulder G/E   IMPRESSION: Moderate to severe osteoarthrosis of the glenohumeral   joint. Small amount of calcification in the supraspinatus/infraspinatus tendons consistent with calcific   tendinosis. Otherwise negative.    Review of Systems   Unable to perform ROS: Acuity of condition   Respiratory: Negative for shortness of breath.    Cardiovascular: Negative for chest pain.   Gastrointestinal: Positive for diarrhea, nausea and vomiting. Negative for abdominal pain.   Genitourinary: Negative for dysuria.   Musculoskeletal: Positive for myalgias (R shoulder).   All other systems reviewed and are negative.    Allergies:  Sulfa drugs    Medications:  Prolia  Oxycodone  Crestor    Past Medical History:     Acute pain of right shoulder  Colon cancer  Leg length discrepancy  Iron deficiency anemia  Osteoporosis  Hyperlipidemia  Bowel incontinence  Gingivitis  Cataracts  Torticollis  Microscopic colitis      Past Surgical History:    MATT   Appendectomy  Colonoscopy x3  EGD  Right  "hemicolectomy  Vertebroplasty     Family History:    Sister: breast cancer  Mother: MVA/blood clot    Social History:  Presents to the emergency department alone.  Arrives via EMS.    Physical Exam     Patient Vitals for the past 24 hrs:   BP Temp Temp src Pulse Resp SpO2 Height Weight   04/24/22 1729 -- -- -- -- -- 92 % -- --   04/24/22 1728 -- -- -- -- -- 90 % -- --   04/24/22 1700 100/64 -- -- 114 -- 91 % -- --   04/24/22 1500 101/51 -- -- 97 -- 93 % -- --   04/24/22 1400 -- -- -- -- -- 94 % -- --   04/24/22 1330 -- -- -- -- -- 92 % -- --   04/24/22 1300 102/56 -- -- -- -- -- -- --   04/24/22 1220 -- -- -- 84 -- -- -- --   04/24/22 1202 -- -- -- -- -- -- 1.702 m (5' 7\") 59 kg (130 lb)   04/24/22 1200 107/51 97.5  F (36.4  C) Temporal -- 16 90 % -- --   04/24/22 1155 -- -- -- -- -- 92 % -- --   04/24/22 1145 107/51 -- -- -- -- -- -- --       Physical Exam  Eyes:  The pupils are equal and round    Conjunctivae and sclerae are normal  ENT:    The nose is normal    Pinnae are normal  CV:  Regular rate and rhythm     No edema  Resp:  Lungs are clear    Non-labored    No rales    No wheezing   GI:  Abdomen is soft with left-sided abdominal tenderness, there is no rigidity    No distension    No rebound tenderness   MS:  Normal muscular tone    No asymmetric leg swelling    Swelling and tenderness of the anterior right shoulder. No erythema.  Skin:  No rash or acute skin lesions noted  Neuro:   Awake, alert.      Speech is normal and fluent.    Face is symmetric.     Moves all extremities    Emergency Department Course   Procedure  Arthrocentesis    Date/Time: 4/24/2022 7:21 PM  Performed by: Jaycob Montes MD  Authorized by: Jaycob Montes MD     Risks, benefits and alternatives discussed.      LOCATION:   Location:  Shoulder  Shoulder:  R glenohumeral  ANESTHESIA (see MAR for exact dosages):   Anesthesia method:  Local infiltration  Local anesthetic:  Lidocaine 1% WITH epi      PROCEDURE DETAILS:     " Needle gauge:  18 G    Ultrasound guidance: yes      Approach:  Anterior    Aspirate characteristics:  Yellow and purulent    Steroid injected: no      Specimen collected: yes      Dressing: adhesive bandage, gauze roll and sterile dressing      PROCEDURE    Patient Tolerance:  Patient tolerated the procedure well with no immediate complications   The patient is asked to continue to rest the joint for a few more days before resuming regular activities.  It may be more painful for the first 1-2 days.       ECG  ECG obtained at 1151, ECG read at 1152  Sinus rhythm with premature supraventricular complexes  Septal infarct, age undetermined   Rate 88 bpm. OH interval 188 ms. QRS duration 74 ms. QT/QTc 354/428 ms. P-R-T axes 87 64 71.     Imaging:  CT Abdomen Pelvis w Contrast   Final Result   IMPRESSION:    1.  Wedge-shaped area of hypoattenuation the upper pole of the right kidney. Given morphology, favor evolving infarction but consider correlation with urinalysis.   2.  Possible gastritis involving the distal stomach.   3.  Bibasilar groundglass pulmonary opacities, most pronounced in the right lower lobe, can be seen with aspiration and/or pneumonia.   4.  Multiple prior vertebroplasties with additional age-indeterminate compression deformity of L1.      Chest XR,  PA & LAT    (Results Pending)     Report per radiology    Laboratory:  Labs Ordered and Resulted from Time of ED Arrival to Time of ED Departure   COMPREHENSIVE METABOLIC PANEL - Abnormal       Result Value    Sodium 132 (*)     Potassium 4.2      Chloride 107      Carbon Dioxide (CO2) 20      Anion Gap 5      Urea Nitrogen 18      Creatinine 0.82      Calcium 8.2 (*)     Glucose 148 (*)     Alkaline Phosphatase 67      AST 26      ALT 33      Protein Total 6.2 (*)     Albumin 2.2 (*)     Bilirubin Total 0.5      GFR Estimate 69     LACTIC ACID WHOLE BLOOD - Abnormal    Lactic Acid 2.4 (*)    CBC WITH PLATELETS AND DIFFERENTIAL - Abnormal    WBC Count  22.4 (*)     RBC Count 4.01      Hemoglobin 12.5      Hematocrit 37.8      MCV 94      MCH 31.2      MCHC 33.1      RDW 15.4 (*)     Platelet Count 260      % Neutrophils 88      % Lymphocytes 5      % Monocytes 6      % Eosinophils 0      % Basophils 0      % Immature Granulocytes 1      NRBCs per 100 WBC 0      Absolute Neutrophils 19.8 (*)     Absolute Lymphocytes 1.0      Absolute Monocytes 1.4 (*)     Absolute Eosinophils 0.0      Absolute Basophils 0.1      Absolute Immature Granulocytes 0.2      Absolute NRBCs 0.0     ROUTINE UA WITH MICROSCOPIC - Abnormal    Color Urine Light Yellow      Appearance Urine Clear      Glucose Urine Negative      Bilirubin Urine Negative      Ketones Urine Negative      Specific Gravity Urine 1.023      Blood Urine Small (*)     pH Urine 6.0      Protein Albumin Urine 10  (*)     Urobilinogen Urine Normal      Nitrite Urine Negative      Leukocyte Esterase Urine Negative      Mucus Urine Present (*)     RBC Urine 1      WBC Urine 2      Squamous Epithelials Urine <1     ISTAT GASES LACTATE VENOUS POCT - Abnormal    Lactic Acid POCT 2.1 (*)     Bicarbonate Venous POCT 23      O2 Sat, Venous POCT 39 (*)     pCO2V Venous POCT 49      pH Venous POCT 7.28 (*)     pO2 Venous POCT 26     CRYSTAL ID SYNOVIAL FLUID - Abnormal    Crystals Analysis   (*)     Value: Positive for intracellular crystals, consistent with calcium pyrophosphate dihydrate crystals.   TROPONIN I - Normal    Troponin I High Sensitivity 22     ISTAT CREATININE POCT - Normal    Creatinine POCT 0.8      GFR, ESTIMATED POCT >60     COVID-19 VIRUS (CORONAVIRUS) BY PCR - Normal    SARS CoV2 PCR Negative     CELL COUNT BODY FLUID   DIFERENTIAL BODY FLUID   ISTAT CREATININE POCT   BLOOD CULTURE   BLOOD CULTURE   URINE CULTURE   GRAM STAIN   CELL COUNT WITH DIFFERENTIAL FLUID        Emergency Department Course:       Reviewed:  I reviewed nursing notes, vitals, past medical history and Care  Everywhere.    Assessments:  1137 I obtained history and examined the patient as noted above.   1315 I rechecked the patient and explained findings.   1335 I rechecked the patient and performed US.  1351 I rechecked the patient again.   1623 I rechecked the patient and performed arthrocentesis.    Consults:  I spoke with Dr. Kelley of the hospitalist service, who agreed to admit the patient.  1422 I spoke with Orthopedics.     Interventions:  Medications   0.9% sodium chloride BOLUS (0 mLs Intravenous Stopped 4/24/22 1316)     Followed by   sodium chloride 0.9% infusion (has no administration in time range)   ondansetron (ZOFRAN) injection 4 mg (4 mg Intravenous Given 4/24/22 1728)   sodium chloride (PF) 0.9% PF flush 3 mL (has no administration in time range)   sodium chloride (PF) 0.9% PF flush 3 mL (has no administration in time range)   vancomycin 1500 mg in 0.9% NaCl 250 ml intermittent infusion 1,500 mg (has no administration in time range)   0.9% sodium chloride BOLUS (0 mLs Intravenous Stopped 4/24/22 1512)   HYDROmorphone (PF) (DILAUDID) injection 0.3 mg (0.3 mg Intravenous Given 4/24/22 1416)   0.9% sodium chloride BOLUS (0 mLs Intravenous Stopped 4/24/22 1812)   iopamidol (ISOVUE-370) solution 65 mL (65 mLs Intravenous Given 4/24/22 1423)   Saline Flush (60 mLs Intravenous Given 4/24/22 1423)   cefTRIAXone (ROCEPHIN) 2 g vial to attach to  ml bag for ADULTS or NS 50 ml bag for PEDS (0 g Intravenous Stopped 4/24/22 1812)   azithromycin (ZITHROMAX) 500 mg vial to attach to  mL bag (500 mg Intravenous New Bag 4/24/22 1816)   HYDROmorphone (DILAUDID) injection 0.2 mg (0.2 mg Intravenous Given 4/24/22 1812)        Disposition:  The patient was admitted to the hospital under the care of Dr. Kelley.    Impression & Plan     Medical Decision Making:  Tamar Rose is a 87 year old female who presents to the emergency department with weakness, diarrhea, and right shoulder pain.  She had been seen  for joint injection earlier this week.  She followed up due to increased pain here in the emergency department and had x-ray performed and was discharged.  Here she has evidence of sepsis with an elevated white blood cell count and elevated lactic acid level.  Discussed with orthopedics regarding her right shoulder pain.  On bedside ultrasound there was apparent fluid pocket in her shoulder joint.  After discussing with orthopedics, was recommended to attempt to aspirate the joint prior to antibiotic initiation.  There was a delay due to another critical patient arriving the emergency department which delayed aspiration and antibiotic administration.  The aspiration went uneventfully and purulent material was drawn from the shoulder.  Discussed this with orthopedics who plan on evaluating patient tomorrow for consideration of any joint washout for other treatment plan.  Patient was given Ceftriaxone, azithromycin, and vancomycin.  Aspirate showed 4+ gram-positive cocci as well as intracellular crystals.    During the course of her work-up due to her abdominal pain and diarrhea, CT scan of her abdomen pelvis was obtained.  There were findings of possible lower lobe pneumonia as well as findings of a possible renal infarct versus infectious findings on her kidney.  Her urinalysis did not appear suspicious for a UTI.  Patient was given IV fluids and antibiotics as noted above.    Patient was discussed with hospitalist agreed accept the patient as an admission.    Diagnosis:    ICD-10-CM    1. Pyogenic arthritis of right shoulder region, due to unspecified organism (H)  M00.9    2. Atelectasis  J98.11    3. Renal lesion  N28.9          Scribe Disclosure:  Chavo GILL, am serving as a scribe at 11:36 AM on 4/24/2022 to document services personally performed by Jaycob Montes MD based on my observations and the provider's statements to me.          Jaycob Montes MD  04/24/22 1928

## 2022-04-24 NOTE — ED NOTES
Bed: ED30  Expected date: 4/24/22  Expected time: 11:20 AM  Means of arrival: Ambulance  Comments:  422 88f nausea ETA 1129

## 2022-04-24 NOTE — ED NOTES
[facility]  ED Nurse Handoff Report    ED Chief complaint: Nausea, Vomiting, & Diarrhea      ED Diagnosis:   Final diagnoses:   None       Code Status: Full Code    Allergies: No Known Allergies    Patient [place]: [place], comes to [facility] to get injections in right shoulder. Here for eval of N/V/D  Focused Assessment:  AOx4, drowsy from EMS droperidol,can maintain sats in low 90s. Right shoulder pain.    Treatments and/or interventions provided: IV labs, CT scan, fluids, BC  Patient's response to treatments and/or interventions: Well    To be done/followed up on inpatient unit:  Monitor    Does this patient have any cognitive concerns?: None    Activity level - Baseline/Home:  Cane  Activity Level - Current:   Total Care    Patient's Preferred language: English   Needed?: No    Isolation: None  Infection: Not Applicable  Patient tested for COVID 19 prior to admission: YES  Bariatric?: No    Vital Signs:   Vitals:    04/24/22 1220 04/24/22 1300 04/24/22 1330 04/24/22 1400   BP:  102/56     Pulse: 84      Resp:       Temp:       TempSrc:       SpO2:   92% 94%   Weight:       Height:           Cardiac Rhythm:     Was the PSS-3 completed:   Yes  What interventions are required if any?  None             Family Comments: Daughter  OBS brochure/video discussed/provided to patient/family: N/A              Name of person given brochure if not patient: NA              Relationship to patient: NA    For the majority of the shift this patient's behavior was Green.   Behavioral interventions performed were None.    ED NURSE PHONE NUMBER: [phone]

## 2022-04-25 NOTE — CONSULTS
[facility] Consultation by [facility]    Tamar Rose MRN# [id number]   Age: 87 year old YOB: 1935     Date of Admission:  4/24/2022    Reason for consult: Retention       Requesting PA/MD: Dr. Gonzalez       Level of consult: Consult, follow and place orders           Assessment and Plan:   Assessment:   Urinary retention  Intractable shoulder pain  Septic shoulder, going to OR      Plan:   Place Coley catheter and leave in place post op until off narcotics, ambulating.  Discussed with RN and she relayed this plan to pre-op team.     Please call if unable to pass TOV and will arrange outpatient follow-up.    Elle Phelps PA-C  [facility]  [phone]               Chief Complaint:   shoulder pain     History is obtained from the patient and EMR.         History of Present Illness:   This patient is a 87 year old female admitted with septic right shoulder. In a tremendous amount of pain despite narcotics. Unable to void and was straight cath'd with 8Fr pediatric catheter. Heading down to OR now with Ortho.      Reports no urinary concerns prior to this. Denies frequency, urgency or incontinence.               Past Medical History:     Past Medical History:   Diagnosis Date     NO ACTIVE PROBLEMS              Past Surgical History:     Past Surgical History:   Procedure Laterality Date     HYSTERECTOMY, MATT               Social History:     Social History     Tobacco Use     Smoking status: Never Smoker     Smokeless tobacco: Not on file   Substance Use Topics     Alcohol use: Yes     Comment: occ             Family History:     Family History   Problem Relation Age of Onset     Cancer Maternal Grandmother      Family history reviewed.             Allergies:     Allergies   Allergen Reactions     Sulfa Drugs Rash             Medications:     Current Facility-Administered Medications   Medication     acetaminophen (TYLENOL) tablet 650 mg    Or     acetaminophen (TYLENOL)  "Suppository 650 mg     acetaminophen (TYLENOL) tablet 1,000 mg     ceFAZolin Sodium (ANCEF) injection 2 g     ceFAZolin Sodium (ANCEF) injection 2 g     cefTRIAXone (ROCEPHIN) 2 g vial to attach to  ml bag for ADULTS or NS 50 ml bag for PEDS     HYDROmorphone (DILAUDID) injection 0.2 mg     lidocaine (LMX4) cream     lidocaine 1 % 0.1-1 mL     melatonin tablet 1 mg     naloxone (NARCAN) injection 0.2 mg    Or     naloxone (NARCAN) injection 0.4 mg    Or     naloxone (NARCAN) injection 0.2 mg    Or     naloxone (NARCAN) injection 0.4 mg     ondansetron (ZOFRAN-ODT) ODT tab 4 mg    Or     ondansetron (ZOFRAN) injection 4 mg     oxyCODONE-acetaminophen (PERCOCET) 5-325 MG per tablet 1 tablet     polyethylene glycol (MIRALAX) Packet 17 g     senna-docusate (SENOKOT-S/PERICOLACE) 8.6-50 MG per tablet 1 tablet    Or     senna-docusate (SENOKOT-S/PERICOLACE) 8.6-50 MG per tablet 2 tablet     sodium chloride (PF) 0.9% PF flush 3 mL     sodium chloride (PF) 0.9% PF flush 3 mL     sodium chloride (PF) 0.9% PF flush 3 mL     sodium chloride 0.9% infusion     vancomycin (VANCOCIN) 500 mg vial to attach to  mL bag             Review of Systems:   A comprehensive 10-point review of systems was performed and found to be negative except as described in the HPI.     BP 90/46 (BP Location: Left arm)   Pulse 100   Temp 98  F (36.7  C) (Oral)   Resp 20   Ht 1.702 m (5' 7\")   Wt 59.1 kg (130 lb 6.4 oz)   SpO2 92%   BMI 20.42 kg/m    PSYCH: NAD, moderate distress  EYES: EOMI            Data:     Lab Results   Component Value Date    WBC 15.6 (H) 04/25/2022    HGB 10.6 (L) 04/25/2022    HCT 33.2 (L) 04/25/2022    MCV 97 04/25/2022     04/25/2022     Lab Results   Component Value Date    CR 0.88 04/25/2022                  "

## 2022-04-25 NOTE — PROGRESS NOTES
SPIRITUAL HEALTH SERVICES Progress Note  [facility]  ON-CALL VISIT    REFERRAL SOURCE: Request at admission for chaplaincy care    Introduced self/role to Tamar who expressed no need for support at this time, simply requesting help with repositioning. Facilitated connection with unit staff to address her need.    PLAN: Spiritual Health remains available.    Rossy Florian  Associate   Phone: [phone]

## 2022-04-25 NOTE — PROVIDER NOTIFICATION
MD Notification    Notified Person: MD    Notified Person Name: Dr. Story       Notification Date/Time: 4/25/22  0337    Notification Interaction: [employer] Messaging     Purpose of Notification: Positive Blood cultures gram positive cocci in clusters, 4/24/22 122pm.      Orders Received:    Comments:

## 2022-04-25 NOTE — PROVIDER NOTIFICATION
Elle lab called with positive BC from shoulder synovial fluid drawn on 3/24 @ 9403. Positive for Staph Aureus

## 2022-04-25 NOTE — OP NOTE
Procedure Date: 04/25/2022    PREOPERATIVE DIAGNOSES:    1.  Right septic shoulder.  2.  Right shoulder degenerative joint disease.    POSTOPERATIVE DIAGNOSES:    1.  Right septic shoulder.  2.  Right shoulder degenerative joint disease.    PROCEDURES:     1.  Right shoulder arthroscopic I and D.  2.  Aggressive debridement of the glenohumeral joint as well as the subacromial space.  3.  Labral debridement.    SURGEON:  Baljinder Cline MD    FIRST ASSISTANT:  Madai Velasquez PA-C.  A skilled first assistant was necessary for patient positioning, prepping and draping, all soft tissue retraction, help with camera management, arm positioning, closing and patient transfer.    ANESTHESIA:  General and block.    COMPLICATIONS:  None apparent.    ESTIMATED BLOOD LOSS:  Minimal.    INDICATIONS FOR PROCEDURE:  The patient is an 87-year-old female who has been complaining of worsening shoulder pain over the last few weeks.  She received a cortisone injection in clinic 5 days ago.  Unfortunately, 3 days later she developed severe shoulder pain, nausea, diarrhea and difficulty with range of motion of the shoulder as well as swelling.  She presented to the ER and aspirate was obtained that was purulent and did grow gram-positive cocci; therefore, we decided the best way to move forward would be an arthroscopic I and D.  She agreed to proceed.    DESCRIPTION OF PROCEDURE:  On the day of the procedure, the patient was met in the preoperative area by the Surgery and Anesthesia teams.  The right shoulder was marked by the operative surgeon.  Informed consent was obtained.  Risks and benefits of surgery were discussed with the patient including risk of bleeding, infection, damage to neurovascular structures, stiffness, continued pain and need for future revision surgery.  She understood and agreed to proceed.    Our Anesthesia colleagues then performed a block.  She was then brought to the operating room and general anesthesia was  administered.  She was placed in the beach chair position.  The right shoulder was prepped and draped in the usual sterile fashion.  Preoperative antibiotics given.  A preoperative timeout was performed.  We began the procedure by making a standard posterior portal shoulder arthroscopy, followed by an anterior portal under direct visualization.  We immediately had purulent fluid come out of the scope and sent it for cultures as well as Gram stain.  We then ran approximately 6 liters of double antibiotic irrigation of normal saline through the joint and used the shaver in order to debride out any infected-looking tissue.  The patient did have severe DJD in the glenoid as well as the humeral head with full loss of cartilage.  She must have had a previous long head of the biceps tear given the biceps was gone and she did have a rotator cuff tear of the anterior supraspinatus as well.  We debrided the rotator cuff.  We debrided the labrum down to a nice stable edge and cleaned out the infected-looking tissue.  We then proceeded into the subacromial space and cleaned out the bursa as well, but that did not appear grossly purulent.  We also ran a liter of double antibiotic irrigation through the shoulder and then turned our attention to closure.      The portal sites were closed in the usual sterile fashion.  Sterile dressings were applied.  The patient was then awakened from anesthesia and brought to the PACU for recovery.  Postoperatively, we will have the Infectious Disease doctors weigh in on antibiotics and we will continue to monitor the cultures for which bug is growing in her shoulder.    Baljinder Cline MD        D: 2022   T: 2022   MT: GERMAN    Name:     RAGHAVENDRA MILLANDorothea  MRN:      [id number]        Account:        [id number]   :      1935           Procedure Date: 2022     Document: [id number]

## 2022-04-25 NOTE — PROGRESS NOTES
Ortho treatment plan    Patient with + gram stain and + crystals from right shoulder aspiration    Plan:  OR today for right shoulder (septic arthritis) I&D with Dr. Cline  NPO   Pain medication as needed, limit narcotics as able  Pre-op optimization per hospitalist    Armida Mcnulty PAC

## 2022-04-25 NOTE — PLAN OF CARE
Summary:  Admitted with R shoulder pain, also found to have R side PNA  DATE & TIME: 4/24/22 1800   Cognitive Concerns/ Orientation : Alert and oriented x4; after daughter left, patient trying to get out of bed   BEHAVIOR & AGGRESSION TOOL COLOR: GREEN  CIWA SCORE: NA   ABNL VS/O2: WDL on 4L of O2  MOBILITY: Lift due to pain, baseline walks with cane  PAIN MANAGMENT: None this shift, but ED gave IV dilaudid  DIET: NPO  BOWEL/BLADDER: Retaining urine x2, carpenter will be inserted   ABNL LAB/BG: Na 132, Pro eliza 1.72, , WBC 22.4  DRAIN/DEVICES: IV x2;  ml/hr  TELEMETRY RHYTHM: NSR  SKIN: Intact, but bruised and fragile skin  TESTS/PROCEDURES: Ortho consulted  D/C DAY/GOALS/PLACE: Just admitted this shift  OTHER IMPORTANT INFO:

## 2022-04-25 NOTE — ANESTHESIA POSTPROCEDURE EVALUATION
Patient: Tamar Rose    Procedure: Procedure(s):  IRRIGATION AND DEBRIDEMENT, RIGHT NATIVE SHOULDER       Anesthesia Type:  General    Note:     Postop Pain Control: Uneventful            Sign Out: Well controlled pain   PONV: No   Neuro/Psych: Uneventful            Sign Out: Acceptable/Baseline neuro status   Airway/Respiratory: Uneventful            Sign Out: Acceptable/Baseline resp. status   CV/Hemodynamics: Uneventful            Sign Out: Acceptable CV status; No obvious hypovolemia; No obvious fluid overload   Other NRE: NONE   DID A NON-ROUTINE EVENT OCCUR? No           Last vitals:  Vitals Value Taken Time   /86 04/25/22 1420   Temp 36.1  C (97  F) 04/25/22 1410   Pulse 105 04/25/22 1420   Resp 15 04/25/22 1420   SpO2 99 % 04/25/22 1414   Vitals shown include unvalidated device data.    Electronically Signed By: Paula Jung MD, MD  April 25, 2022  2:50 PM

## 2022-04-25 NOTE — H&P
[facility]    History and Physical - Hospitalist Service       Date of Admission:  4/24/2022    Assessment & Plan      Tamar Rose is an 87 year old female with history of colon cancer, hyperlipidemia, osteoporosis, microscopic colitis, and iron deficiency anemia who presented to the ED with 3 days of nausea, diarrhea, and progressive right shoulder pain.  Arthrocentesis in ED concerning for septic joint.     Right septic arthritis of the shoulder  No history of joint replacements or surgery per patient's daughter.  She apparently received a steroid injection 5 days ago and since then has had increasing pain refractory to PTA oxycodone, as well as edema.  She is also had nausea and diarrhea in the past few days.  Arthrocentesis in the ED congruent with septic joint with purulent fluid aspirated and gram stain etc. pending.  ED MD discussed with orthopedics who recommended IV antibiotics and Ortho will see them in the morning.  - Admit to inpatient  - Continue broad-spectrum antibiotics  - Orthopedic surgery consultation  - As needed analgesia  - N.p.o. at midnight    Right upper pole kidney hypoattenuation-concern for infarction  No back or flank pain reported.  UA not congruent with infection.  Finding was noted on CT incidentally.  - obtain ultrasound of the kidney    Nausea, vomiting, and diarrhea   Correlating with progressive worsening of right shoulder, possible gastritis on CT.  No bloody or black BM reported.  - Continue to monitor and treat symptomatically      Chronic conditions  Osteoporosis  FRANCESCO  Hyperlipidemia  History of colon cancer    COVID 19 screening  Low suspicion. PCR 4/24 negative.      Diet:   reg, npo midnight  DVT Prophylaxis: Pneumatic Compression Devices  Coley Catheter: Not present  Central Lines: None  Cardiac Monitoring: None  Code Status:   Full Code - confirmed with daughter at bedside, patient somewhat out of it right now    Clinically Significant Risk  Factors Present on Admission         # Hyponatremia: Na = 132 mmol/L (Ref range: 133 - 144 mmol/L) on admission, will monitor as appropriate     # Hypoalbuminemia: Albumin = 2.2 g/dL (Ref range: 3.4 - 5.0 g/dL) on admission, will monitor as appropriate          Disposition Plan   Expected Discharge: 2+days  The patient's care was discussed with the Patient, Patient's Family and ED MD.    Ronnie Kelley MD  Hospitalist Service  [facility]  Securely message with the Vocera Web Console (learn more here)  Text page via [facility] Paging/Directory         ______________________________________________________________________    Chief Complaint   Right shoulder pain, nausea and diarrhea    History is obtained from the patient's daughter primarily, chart review, and discussion with patient    History of Present Illness   Tamar Rose is an 87 year old female with history of colon cancer, hyperlipidemia, osteoporosis, microscopic colitis, and iron deficiency anemia who presented to the ED with 3 days of nausea, diarrhea, and progressive right shoulder pain.  Patient is somewhat out of it now but can follow commands and answer questions with repeated verbal prompts.  Her daughter is at bedside and assisting with history.  Patient received a steroid injection in her right shoulder on Tuesday which was 5 days ago.  Since then she has had increased pain and swelling and her oxycodone was no longer providing relief.  She was also on oral steroids for her shoulder, presumably.  Daughter reports she has not had any shoulder surgery or replacement.  No new cough, fevers, chills, shortness of breath or chest pain reported.  No dysuria or abdominal pain reported.  CT of the abdomen and pelvis with contrast was completed in the ED showing bibasilar groundglass pulmonary opacities more so on the right lower lobe concerning for aspiration and/or/pneumonia-of note she was recently treated about 2 weeks ago as  an outpatient with antibiotics for concern of pneumonia.  Her cough was resolved and had not returned.  Additionally concern for gastritis in the distal stomach is noted as well as a wedge-shaped area of hypoattenuation in the upper pole of the right kidney concerning for evolving infarction.  UA fairly bland.  She does have a leukocytosis of 22,000.  Likely has initially 2.4 improving to 2.1 with fluids.  Orthopedics has been alerted of patient's concerning arthrocentesis findings with purulent aspirated fluid.  She has been started on broad-spectrum antibiotics and will be admitted to hospital.    Review of Systems    Review of systems not obtained due to patient factors - condition    Past Medical History    I have reviewed this patient's medical history and updated it with pertinent information if needed.   Past Medical History:   Diagnosis Date     NO ACTIVE PROBLEMS        Past Surgical History   I have reviewed this patient's surgical history and updated it with pertinent information if needed.  Past Surgical History:   Procedure Laterality Date     HYSTERECTOMY, MATT         Social History   I have reviewed this patient's social history and updated it with pertinent information if needed.  Social History     Tobacco Use     Smoking status: Never Smoker   Substance Use Topics     Alcohol use: Yes     Comment: occ       Family History   I have reviewed this patient's family history and updated it with pertinent information if needed.  Family History   Problem Relation Age of Onset     Cancer Maternal Grandmother        Prior to Admission Medications   Prior to Admission Medications   Prescriptions Last Dose Informant Patient Reported? Taking?   ibuprofen (ADVIL/MOTRIN) 200 MG tablet  at prn  Yes Yes   Sig: Take 200 mg by mouth every 4 hours as needed for mild pain   methylPREDNISolone (MEDROL DOSEPAK) 4 MG tablet therapy pack 4/24/2022 at AM  Yes Yes   Sig: Take by mouth See Admin Instructions Follow Package  Directions   oxyCODONE-acetaminophen (PERCOCET) 5-325 MG tablet 4/24/2022 at 0200  No Yes   Sig: Take 0.5-1 tablets by mouth every 6 hours as needed for pain   rosuvastatin (CRESTOR) 20 MG tablet 4/23/2022 at AM  Yes Yes   Sig: Take 20 mg by mouth daily      Facility-Administered Medications: None     Allergies   Allergies   Allergen Reactions     Sulfa Drugs Rash       Physical Exam   Vital Signs: Temp: 97.5  F (36.4  C) Temp src: Temporal BP: 118/72 Pulse: 109   Resp: 16 SpO2: 95 % O2 Device: Nasal cannula Oxygen Delivery: 3 LPM  Weight: 130 lbs 0 oz    Gen: NAD, elderly, ill, fatigued  HEENT: EOMI, MMM  Resp: no crackles,  no wheezes, no increased work of resp  CV: S1S2 heard, reg rhythm, reg rate  Abdo: soft, nontender, nondistended, bowel sounds present  Ext: calves nontender, well perfused  Neuro: sleepy awakens to voice, follows simple commands, moving all ext, CN grossly intact, no facial asymmetry      Data   Data reviewed today: I reviewed all medications, new labs and imaging results over the last 24 hours. I personally reviewed no images or EKG's today.    Recent Labs   Lab 04/24/22  1323 04/24/22  1322 04/24/22  1158   WBC  --   --  22.4*   HGB  --   --  12.5   MCV  --   --  94   PLT  --   --  260   NA  --  132*  --    POTASSIUM  --  4.2  --    CHLORIDE  --  107  --    CO2  --  20  --    BUN  --  18  --    CR 0.8 0.82  --    ANIONGAP  --  5  --    MYRIAM  --  8.2*  --    GLC  --  148*  --    ALBUMIN  --  2.2*  --    PROTTOTAL  --  6.2*  --    BILITOTAL  --  0.5  --    ALKPHOS  --  67  --    ALT  --  33  --    AST  --  26  --      Recent Results (from the past 24 hour(s))   CT Abdomen Pelvis w Contrast    Narrative    EXAM: CT ABDOMEN PELVIS W CONTRAST  LOCATION: [facility]  DATE/TIME: 4/24/2022 2:14 PM    INDICATION: Abdominal pain, acute, nonlocalized  COMPARISON: None.  TECHNIQUE: CT scan of the abdomen and pelvis was performed following injection of IV contrast. Multiplanar  reformats were obtained. Dose reduction techniques were used.  CONTRAST: 60mL Isovue 370    FINDINGS:   LOWER CHEST: Bibasilar primarily groundglass opacities, most pronounced in the right lower lobe. No pleural effusion. Small hiatal hernia.    HEPATOBILIARY: Cholelithiasis without evidence of acute cholecystitis. No biliary obstruction.    PANCREAS: Normal.    SPLEEN: Normal.    ADRENAL GLANDS: Normal.    KIDNEYS/BLADDER: Wedge-shaped area of hypoenhancement in the upper pole of the right kidney measuring up to 1.9 cm (coronal image 59). No hydronephrosis. Urinary bladder is distended but otherwise unremarkable.    BOWEL: Prior right hemicolectomy without evidence of bowel obstruction. There is mucosal hyperenhancement and submucosal edema involving the gastric antrum (series 4 image 55).    LYMPH NODES: Normal.    VASCULATURE: Ectasia of the infrarenal abdominal aorta measuring up to 2.7 cm with scattered calcified atherosclerosis.    PELVIC ORGANS: Prior hysterectomy.    MUSCULOSKELETAL: Prior vertebroplasty of L2, L3 and L5 with age-indeterminate compression deformity of L1. 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      Impression    IMPRESSION:   1.  Wedge-shaped area of hypoattenuation the upper pole of the right kidney. Given morphology, favor evolving infarction but consider correlation with urinalysis.  2.  Possible gastritis involving the distal stomach.  3.  Bibasilar groundglass pulmonary opacities, most pronounced in the right lower lobe, can be seen with aspiration and/or pneumonia.  4.  Multiple prior vertebroplasties with additional age-indeterminate compression deformity of L1.

## 2022-04-25 NOTE — PHARMACY-VANCOMYCIN DOSING SERVICE
Pharmacy Vancomycin Initial Note  Date of Service 2022  Patient's  1935  87 year old, female    Indication: Bone and Joint Infection    Current estimated CrCl = Estimated Creatinine Clearance: 47.1 mL/min (based on SCr of 0.8 mg/dL).    Creatinine for last 3 days  2022:  1:22 PM Creatinine 0.82 mg/dL;  1:23 PM Creatinine POCT 0.8 mg/dL    Recent Vancomycin Level(s) for last 3 days  No results found for requested labs within last 72 hours.      Vancomycin IV Administrations (past 72 hours)                   vancomycin 1500 mg in 0.9% NaCl 250 ml intermittent infusion 1,500 mg (mg) 1,500 mg New Bag 22                Nephrotoxins and other renal medications (From now, onward)    Start     Dose/Rate Route Frequency Ordered Stop    22 0830  vancomycin (VANCOCIN) 500 mg vial to attach to  mL bag         500 mg  over 1 Hours Intravenous EVERY 12 HOURS 22 1830  vancomycin 1500 mg in 0.9% NaCl 250 ml intermittent infusion 1,500 mg         1,500 mg  over 90 Minutes Intravenous ONCE 22 1828            Contrast Orders - past 72 hours (72h ago, onward)    Start     Dose/Rate Route Frequency Stop    22 1425  iopamidol (ISOVUE-370) solution 65 mL         65 mL Intravenous ONCE 22 1423          [employer] Prediction of Planned Initial Vancomycin Regimen  Loading dose: 1500 mg x 1  Regimen: 500 mg IV every 12 hours.  Start time: 08:33 on 2022  Exposure target: AUC24 (range)400-600 mg/L.hr   AUC24,ss: 466 mg/L.hr  Probability of AUC24 > 400: 67 %  Ctrough,ss: 16.1 mg/L  Probability of Ctrough,ss > 20: 29 %  Probability of nephrotoxicity (Lodise ROMA ): 11 %      Plan:  1. Start vancomycin  500 mg IV q12h. (ED received load of 1500 mg)  2. Vancomycin monitoring method: AUC  3. Vancomycin therapeutic monitoring goal: 400-600 mg*h/L  4. Pharmacy will check vancomycin levels as appropriate in 1-3 Days.    5. Serum creatinine levels will be  ordered a minimum of twice weekly.      Emerson Ricardo [facility]

## 2022-04-25 NOTE — CONSULTS
[facility]    Orthopedic Consultation    Tamar Rose MRN# [id number]   Age: 87 year old YOB: 1935     Date of Admission:  4/24/2022    Reason for consult: R septic shoulder       Requesting physician: Warren       Level of consult: Consult, follow and place orders           Assessment and Plan:   Assessment:   R septic shoulder with aspirate growing GPCs  R shoulder djd      Plan:   Plan for arthroscopic I&D of the R shoulder this am  ID for management of abx           Chief Complaint:   R shoulder pain         History of Present Illness:   This patient is a 87 year old female who presents with the following condition requiring a hospital admission:    88 yo F with multiple medical problems who has a history of progressive pain in her R shoulder over the last few months. 6 days ago she received a cortisone injection in the R shoulder and over the last 3 days she developed nausea, diarrhea, and progressivelly worsening R shoulder pain. She presnted to the ED where there was concern about her R shoulder given significant swelling and pain. An aspiration was performed which was positive for crystals as well as positive for gram stain and GPCs. Currently continues to complain of significant shoulder pain diffusely, swelling about the shoulder, and pain that is worsened with any ROM. No fevers/chills.          Past Medical History:     Past Medical History:   Diagnosis Date     NO ACTIVE PROBLEMS              Past Surgical History:     Past Surgical History:   Procedure Laterality Date     HYSTERECTOMY, MATT               Social History:     Social History     Tobacco Use     Smoking status: Never Smoker     Smokeless tobacco: Not on file   Substance Use Topics     Alcohol use: Yes     Comment: occ             Family History:     Family History   Problem Relation Age of Onset     Cancer Maternal Grandmother              Immunizations:     VACCINE/DOSE   Diptheria   DPT   DTAP    HBIG   Hepatitis A   Hepatitis B   HIB   Influenza   Measles   Meningococcal   MMR   Mumps   Pneumococcal   Polio   Rubella   Small Pox   TDAP   Varicella   Zoster             Allergies:     Allergies   Allergen Reactions     Sulfa Drugs Rash             Medications:     Current Facility-Administered Medications   Medication     [Auto Hold] acetaminophen (TYLENOL) tablet 650 mg    Or     [Auto Hold] acetaminophen (TYLENOL) Suppository 650 mg     [Auto Hold] acetaminophen (TYLENOL) tablet 1,000 mg     [Auto Hold] ceFAZolin (ANCEF) intermittent infusion 2 g in 100 mL dextrose PRE-MIX     ceFAZolin Sodium (ANCEF) injection 2 g     ceFAZolin Sodium (ANCEF) injection 2 g     [Auto Hold] HYDROmorphone (DILAUDID) injection 0.2 mg     [Auto Hold] lidocaine (LMX4) cream     [Auto Hold] lidocaine 1 % 0.1-1 mL     [Auto Hold] melatonin tablet 1 mg     [Auto Hold] naloxone (NARCAN) injection 0.2 mg    Or     [Auto Hold] naloxone (NARCAN) injection 0.4 mg    Or     [Auto Hold] naloxone (NARCAN) injection 0.2 mg    Or     [Auto Hold] naloxone (NARCAN) injection 0.4 mg     [Auto Hold] ondansetron (ZOFRAN-ODT) ODT tab 4 mg    Or     [Auto Hold] ondansetron (ZOFRAN) injection 4 mg     [Auto Hold] oxyCODONE-acetaminophen (PERCOCET) 5-325 MG per tablet 1 tablet     [Auto Hold] polyethylene glycol (MIRALAX) Packet 17 g     [Auto Hold] senna-docusate (SENOKOT-S/PERICOLACE) 8.6-50 MG per tablet 1 tablet    Or     [Auto Hold] senna-docusate (SENOKOT-S/PERICOLACE) 8.6-50 MG per tablet 2 tablet     [Auto Hold] sodium chloride (PF) 0.9% PF flush 3 mL     [Auto Hold] sodium chloride (PF) 0.9% PF flush 3 mL     [Auto Hold] sodium chloride (PF) 0.9% PF flush 3 mL     sodium chloride 0.9% infusion     [Auto Hold] vancomycin (VANCOCIN) 500 mg vial to attach to  mL bag             Review of Systems:   All review of systems was performed and was negative except as per hpi            Physical Exam:   All vitals have been reviewed  Patient  "Vitals for the past 24 hrs:   BP Temp Temp src Pulse Resp SpO2 Height Weight   04/25/22 1050 111/60 (!) 100.9  F (38.3  C) Temporal -- 18 97 % -- --   04/25/22 0900 -- -- -- -- 20 -- -- --   04/25/22 0812 90/46 98  F (36.7  C) Oral 100 22 92 % -- --   04/25/22 0115 103/54 98.3  F (36.8  C) Oral 70 20 -- -- --   04/24/22 2100 104/61 97.1  F (36.2  C) Oral 116 20 98 % -- 59.1 kg (130 lb 6.4 oz)   04/24/22 2030 108/65 -- -- 106 -- 95 % -- --   04/24/22 2000 93/62 -- -- 110 -- 96 % -- --   04/24/22 1830 118/72 -- -- 109 -- 95 % -- --   04/24/22 1800 115/75 -- -- 112 -- 96 % -- --   04/24/22 1752 -- -- -- -- -- 96 % -- --   04/24/22 1749 -- -- -- -- -- 95 % -- --   04/24/22 1730 103/80 -- -- 110 -- 90 % -- --   04/24/22 1729 -- -- -- -- -- 92 % -- --   04/24/22 1728 -- -- -- -- -- 90 % -- --   04/24/22 1700 100/64 -- -- 114 -- 91 % -- --   04/24/22 1500 101/51 -- -- 97 -- 93 % -- --   04/24/22 1400 -- -- -- -- -- 94 % -- --   04/24/22 1330 -- -- -- -- -- 92 % -- --   04/24/22 1300 102/56 -- -- -- -- -- -- --   04/24/22 1220 -- -- -- 84 -- -- -- --   04/24/22 1202 -- -- -- -- -- -- 1.702 m (5' 7\") 59 kg (130 lb)   04/24/22 1200 107/51 97.5  F (36.4  C) Temporal -- 16 90 % -- --   04/24/22 1155 -- -- -- -- -- 92 % -- --   04/24/22 1145 107/51 -- -- -- -- -- -- --     No intake or output data in the 24 hours ending 04/25/22 1114  NAD  nonlabored breathing  No peripheral edema  Skin intact  White sclera  RUE:  +swelling about the R shoulder  Skin intact  Pain with any attempted ROM  Unable to test strength            Data:   All laboratory data reviewed  Results for orders placed or performed during the hospital encounter of 04/24/22   Arthrocentesis     Status: None    Narrative    Jaycob Montes MD     4/24/2022  7:28 PM  Arthrocentesis    Date/Time: 4/24/2022 7:21 PM  Performed by: Jaycob Montes MD  Authorized by: Jaycob Montes MD     Risks, benefits and alternatives discussed.      LOCATION: "   Location:  Shoulder  Shoulder:  R glenohumeral  ANESTHESIA (see MAR for exact dosages):   Anesthesia method:  Local infiltration  Local anesthetic:  Lidocaine 1% WITH epi      PROCEDURE DETAILS:     Needle gauge:  18 G    Ultrasound guidance: yes      Approach:  Anterior    Aspirate characteristics:  Yellow and purulent    Steroid injected: no      Specimen collected: yes      Dressing: adhesive bandage, gauze roll and sterile dressing      PROCEDURE    Patient Tolerance:  Patient tolerated the procedure well with no immediate   complications   The patient is asked to continue to rest the joint for a few more days   before resuming regular activities.  It may be more painful for the first   1-2 days.   CT Abdomen Pelvis w Contrast     Status: None    Narrative    EXAM: CT ABDOMEN PELVIS W CONTRAST  LOCATION: [facility]  DATE/TIME: 4/24/2022 2:14 PM    INDICATION: Abdominal pain, acute, nonlocalized  COMPARISON: None.  TECHNIQUE: CT scan of the abdomen and pelvis was performed following injection of IV contrast. Multiplanar reformats were obtained. Dose reduction techniques were used.  CONTRAST: 60mL Isovue 370    FINDINGS:   LOWER CHEST: Bibasilar primarily groundglass opacities, most pronounced in the right lower lobe. No pleural effusion. Small hiatal hernia.    HEPATOBILIARY: Cholelithiasis without evidence of acute cholecystitis. No biliary obstruction.    PANCREAS: Normal.    SPLEEN: Normal.    ADRENAL GLANDS: Normal.    KIDNEYS/BLADDER: Wedge-shaped area of hypoenhancement in the upper pole of the right kidney measuring up to 1.9 cm (coronal image 59). No hydronephrosis. Urinary bladder is distended but otherwise unremarkable.    BOWEL: Prior right hemicolectomy without evidence of bowel obstruction. There is mucosal hyperenhancement and submucosal edema involving the gastric antrum (series 4 image 55).    LYMPH NODES: Normal.    VASCULATURE: Ectasia of the infrarenal abdominal  aorta measuring up to 2.7 cm with scattered calcified atherosclerosis.    PELVIC ORGANS: Prior hysterectomy.    MUSCULOSKELETAL: Prior vertebroplasty of L2, L3 and L5 with age-indeterminate compression deformity of L1. 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      Impression    IMPRESSION:   1.  Wedge-shaped area of hypoattenuation the upper pole of the right kidney. Given morphology, favor evolving infarction but consider correlation with urinalysis.  2.  Possible gastritis involving the distal stomach.  3.  Bibasilar groundglass pulmonary opacities, most pronounced in the right lower lobe, can be seen with aspiration and/or pneumonia.  4.  Multiple prior vertebroplasties with additional age-indeterminate compression deformity of L1.   US Renal Complete     Status: None    Narrative    EXAM: US RENAL COMPLETE  LOCATION: [facility]  DATE/TIME: 4/24/2022 7:40 PM    INDICATION: Abdominal pain. Possible infarct upper pole right kidney seen on CT.   COMPARISON: CT abdomen and pelvis 04/24/2022  TECHNIQUE: Routine Bilateral Renal and Bladder Ultrasound.    FINDINGS:    RIGHT KIDNEY: 8.9 x 5.1 x 3.3 cm. Normal without hydronephrosis or masses.     LEFT KIDNEY: 9.5 x 5.0 x 5.4 cm. Normal without hydronephrosis or masses.     BLADDER: Normal.      Impression    IMPRESSION:  1.  Normal kidney ultrasound. Hypodense lesion upper pole right kidney seen on CT not appreciated sonographically.   XR Chest Port 1 View     Status: None    Narrative    EXAM: XR CHEST PORT 1 VIEW  LOCATION: [facility]  DATE/TIME: 4/24/2022 8:08 PM    INDICATION: Sepsis.  COMPARISON: None.      Impression    IMPRESSION: Moderate infiltrates within the right lower lung most consistent with pneumonia. Additional mild infiltrates in the left lung base may be related. Normal heart size and pulmonary vascularity.   Comprehensive metabolic panel     Status: Abnormal   Result  Value Ref Range    Sodium 132 (L) 133 - 144 mmol/L    Potassium 4.2 3.4 - 5.3 mmol/L    Chloride 107 94 - 109 mmol/L    Carbon Dioxide (CO2) 20 20 - 32 mmol/L    Anion Gap 5 3 - 14 mmol/L    Urea Nitrogen 18 7 - 30 mg/dL    Creatinine 0.82 0.52 - 1.04 mg/dL    Calcium 8.2 (L) 8.5 - 10.1 mg/dL    Glucose 148 (H) 70 - 99 mg/dL    Alkaline Phosphatase 67 40 - 150 U/L    AST 26 0 - 45 U/L    ALT 33 0 - 50 U/L    Protein Total 6.2 (L) 6.8 - 8.8 g/dL    Albumin 2.2 (L) 3.4 - 5.0 g/dL    Bilirubin Total 0.5 0.2 - 1.3 mg/dL    GFR Estimate 69 >60 mL/min/1.73m2   Lactic acid whole blood     Status: Abnormal   Result Value Ref Range    Lactic Acid 2.4 (H) 0.7 - 2.0 mmol/L   Troponin I     Status: Normal   Result Value Ref Range    Troponin I High Sensitivity 22 <54 ng/L   CBC with platelets and differential     Status: Abnormal   Result Value Ref Range    WBC Count 22.4 (H) 4.0 - 11.0 10e3/uL    RBC Count 4.01 3.80 - 5.20 10e6/uL    Hemoglobin 12.5 11.7 - 15.7 g/dL    Hematocrit 37.8 35.0 - 47.0 %    MCV 94 78 - 100 fL    MCH 31.2 26.5 - 33.0 pg    MCHC 33.1 31.5 - 36.5 g/dL    RDW 15.4 (H) 10.0 - 15.0 %    Platelet Count 260 150 - 450 10e3/uL    % Neutrophils 88 %    % Lymphocytes 5 %    % Monocytes 6 %    % Eosinophils 0 %    % Basophils 0 %    % Immature Granulocytes 1 %    NRBCs per 100 WBC 0 <1 /100    Absolute Neutrophils 19.8 (H) 1.6 - 8.3 10e3/uL    Absolute Lymphocytes 1.0 0.8 - 5.3 10e3/uL    Absolute Monocytes 1.4 (H) 0.0 - 1.3 10e3/uL    Absolute Eosinophils 0.0 0.0 - 0.7 10e3/uL    Absolute Basophils 0.1 0.0 - 0.2 10e3/uL    Absolute Immature Granulocytes 0.2 <=0.4 10e3/uL    Absolute NRBCs 0.0 10e3/uL   UA with Microscopic     Status: Abnormal   Result Value Ref Range    Color Urine Light Yellow Colorless, Straw, Light Yellow, Yellow    Appearance Urine Clear Clear    Glucose Urine Negative Negative mg/dL    Bilirubin Urine Negative Negative    Ketones Urine Negative Negative mg/dL    Specific Gravity Urine  1.023 1.003 - 1.035    Blood Urine Small (A) Negative    pH Urine 6.0 5.0 - 7.0    Protein Albumin Urine 10  (A) Negative mg/dL    Urobilinogen Urine Normal Normal, 2.0 mg/dL    Nitrite Urine Negative Negative    Leukocyte Esterase Urine Negative Negative    Mucus Urine Present (A) None Seen /LPF    RBC Urine 1 <=2 /HPF    WBC Urine 2 <=5 /HPF    Squamous Epithelials Urine <1 <=1 /HPF   Creatinine POCT     Status: Normal   Result Value Ref Range    Creatinine POCT 0.8 0.5 - 1.0 mg/dL    GFR, ESTIMATED POCT >60 >60 mL/min/1.73m2   iStat Gases (lactate) venous, POCT     Status: Abnormal   Result Value Ref Range    Lactic Acid POCT 2.1 (H) <=2.0 mmol/L    Bicarbonate Venous POCT 23 21 - 28 mmol/L    O2 Sat, Venous POCT 39 (L) 94 - 100 %    pCO2V Venous POCT 49 40 - 50 mm Hg    pH Venous POCT 7.28 (L) 7.32 - 7.43    pO2 Venous POCT 26 25 - 47 mm Hg   Crystal ID Synovial Fluid     Status: Abnormal   Result Value Ref Range    Crystals Analysis (A) No clinically significant crystals seen.     Positive for intracellular crystals, consistent with calcium pyrophosphate dihydrate crystals.   Asymptomatic COVID-19 Virus (Coronavirus) by PCR Nasopharyngeal     Status: Normal    Specimen: Nasopharyngeal; Swab   Result Value Ref Range    SARS CoV2 PCR Negative Negative    Narrative    Testing was performed using the Xpert Xpress SARS-CoV-2 Assay on the   Cepheid Gene-Xpert Instrument Systems. Additional information about   this Emergency Use Authorization (EUA) assay can be found via the Lab   Guide. This test should be ordered for the detection of SARS-CoV-2 in   individuals who meet SARS-CoV-2 clinical and/or epidemiological   criteria. Test performance is unknown in asymptomatic patients. This   test is for in vitro diagnostic use under the FDA EUA for   laboratories certified under CLIA to perform high complexity testing.   This test has not been FDA cleared or approved. A negative result   does not rule out the presence of  PCR inhibitors in the specimen or   target RNA in concentration below the limit of detection for the   assay. The possibility of a false negative should be considered if   the patient's recent exposure or clinical presentation suggests   COVID-19. This test was validated by the [facility]. This laboratory is certified under the Clinical Laboratory Improvement Amendments of 1988 (CLIA-88) as qualified to perform high complexity laboratory testing.     Cell Count Body Fluid     Status: Abnormal   Result Value Ref Range    Color Yellow Colorless, Yellow    Clarity Turbid (A) Clear, Bloody    Cell Count Fluid Source Joint, Other     Narrative    No reference ranges have been established.  This result  should be interpreted in the context of the patient's clinical condition and   compared to simultaneous measurement in the patient's blood.         Differential Body Fluid     Status: None   Result Value Ref Range    % Neutrophils      % Lymphocytes      % Monocyte/Macrophages      Narrative    Specimen clotted. Slide reviewed, no abnormal cells seen.  No reference ranges have been established. This result should be interpreted in the context of the patient's clinical condition and compared to simultaneous measurement in the patient's blood.   Procalcitonin     Status: Abnormal   Result Value Ref Range    Procalcitonin 1.72 (H) <0.05 ng/mL   CRP inflammation     Status: Abnormal   Result Value Ref Range    CRP Inflammation 296.0 (H) 0.0 - 8.0 mg/L   Comprehensive metabolic panel     Status: Abnormal   Result Value Ref Range    Sodium 136 133 - 144 mmol/L    Potassium 4.2 3.4 - 5.3 mmol/L    Chloride 110 (H) 94 - 109 mmol/L    Carbon Dioxide (CO2) 21 20 - 32 mmol/L    Anion Gap 5 3 - 14 mmol/L    Urea Nitrogen 14 7 - 30 mg/dL    Creatinine 0.88 0.52 - 1.04 mg/dL    Calcium 7.7 (L) 8.5 - 10.1 mg/dL    Glucose 110 (H) 70 - 99 mg/dL    Alkaline Phosphatase 88 40 - 150 U/L    AST 37 0 - 45  U/L    ALT 38 0 - 50 U/L    Protein Total 5.4 (L) 6.8 - 8.8 g/dL    Albumin 1.9 (L) 3.4 - 5.0 g/dL    Bilirubin Total 0.6 0.2 - 1.3 mg/dL    GFR Estimate 64 >60 mL/min/1.73m2   CBC with platelets     Status: Abnormal   Result Value Ref Range    WBC Count 15.6 (H) 4.0 - 11.0 10e3/uL    RBC Count 3.43 (L) 3.80 - 5.20 10e6/uL    Hemoglobin 10.6 (L) 11.7 - 15.7 g/dL    Hematocrit 33.2 (L) 35.0 - 47.0 %    MCV 97 78 - 100 fL    MCH 30.9 26.5 - 33.0 pg    MCHC 31.9 31.5 - 36.5 g/dL    RDW 15.6 (H) 10.0 - 15.0 %    Platelet Count 244 150 - 450 10e3/uL   CRP inflammation     Status: Abnormal   Result Value Ref Range    CRP Inflammation 260.0 (H) 0.0 - 8.0 mg/L   EKG 12-lead, tracing only     Status: None   Result Value Ref Range    Systolic Blood Pressure  mmHg    Diastolic Blood Pressure  mmHg    Ventricular Rate 88 BPM    Atrial Rate 88 BPM    TX Interval 188 ms    QRS Duration 74 ms     ms    QTc 428 ms    P Axis 87 degrees    R AXIS 64 degrees    T Axis 71 degrees    Interpretation ECG       Sinus rhythm with Premature supraventricular complexes  Septal infarct , age undetermined  Abnormal ECG  No previous ECGs available  Confirmed by GENERATED REPORT, COMPUTER ([id number]),  LEONEL BERGER ([id number]) on 4/24/2022 11:55:08 AM     Blood Culture Line, venous     Status: Abnormal (Preliminary result)    Specimen: Line, venous; Blood   Result Value Ref Range    Culture Positive on the 1st day of incubation (A)     Culture Gram positive cocci in clusters (AA)    Blood Culture Peripheral Blood     Status: Abnormal (Preliminary result)    Specimen: Peripheral Blood   Result Value Ref Range    Culture Positive on the 1st day of incubation (A)     Culture Gram positive cocci in clusters (AA)    Gram stain     Status: Abnormal    Specimen: Shoulder, Right; Synovial fluid   Result Value Ref Range    Gram Stain Result 4+ Gram positive cocci (A)     Gram Stain Result 4+ WBC seen (A)    Verigene GP Panel     Status: Abnormal     Specimen: Line, venous; Blood   Result Value Ref Range    Staphylococcus aureus Detected (A) Not Detected    Staphylococcus epidermidis Not Detected Not Detected    Staphylococcus lugdunensis Not Detected Not Detected    Enterococcus faecalis Not Detected Not Detected    Enterococcus faecium Not Detected Not Detected    Streptococcus species Not Detected Not Detected    Streptococcus agalactiae Not Detected Not Detected    Streptococcus anginosus group Not Detected Not Detected    Streptococcus pneumoniae Not Detected Not Detected    Streptococcus pyogenes Not Detected Not Detected    Listeria species Not Detected Not Detected    Narrative    Specimen tested with Wantfuligene multiplex, gram-positive blood culture nucleic acid test for the following targets: Staphylococcus aureus, Staphylococcus epidermidis, Staphylococcus lugdunensis, other Staphylococcus species, Enterococcus faecalis, Enterococcus faecium, Streptococcus species, Streptococcus agalactiae, Streptococcus anginosus group, Streptococcus pneumoniae, Streptococcus pyogenes, Listeria species, mecA (methicillin resistance), and Red/vanB (vancomycin resistance).   CBC with platelets differential     Status: Abnormal    Narrative    The following orders were created for panel order CBC with platelets differential.  Procedure                               Abnormality         Status                     ---------                               -----------         ------                     CBC with platelets and d...[[id number]]  Abnormal            Final result                 Please view results for these tests on the individual orders.   Cell count with differential fluid     Status: Abnormal    Narrative    The following orders were created for panel order Cell count with differential fluid.  Procedure                               Abnormality         Status                     ---------                               -----------         ------                      Cell Count Body Fluid[[id number]]        Abnormal            Final result               Differential Body Fluid[[id number]]                          Final result                 Please view results for these tests on the individual orders.          Attestation:  Amount of time performed on this consult: 15 minutes.    Baljinder Cline MD

## 2022-04-25 NOTE — ANESTHESIA PROCEDURE NOTES
Airway       Patient location during procedure: OR       Procedure Start/Stop Times: 4/25/2022 11:20 AM  Staff -        Anesthesiologist:  Paula Jung MD       CRNA: Earlene Lopez APRN CRNA       Performed By: CRNAIndications and Patient Condition       Indications for airway management: tai-procedural       Induction type:RSI       Mask difficulty assessment: 0 - not attempted    Final Airway Details       Final airway type: endotracheal airway       Successful airway: ETT - single  Endotracheal Airway Details        ETT size (mm): 7.0       Cuffed: yes       Successful intubation technique: video laryngoscopy       VL Blade Size: Blank 3       Grade View of Cords: 1       Adjucts: stylet       Position: Right       Measured from: gums/teeth       Secured at (cm): 21       Bite block used: None    Post intubation assessment        Placement verified by: capnometry, equal breath sounds and chest rise        Number of attempts at approach: 1       Secured with: pink tape       Ease of procedure: easy       Dentition: Intact and Unchanged    Medication(s) Administered   Medication Administration Time: 4/25/2022 11:20 AM

## 2022-04-25 NOTE — PROVIDER NOTIFICATION
MD Notification    Notified Person: MD    Notified Person Name:  Lisa    Notification Date/Time: 4/25 0820    Notification Interaction:  Vocera page    Purpose of Notification:  Critical lab +BC Staph aureus  pain 10/10.  Percocet ineffective  Not urinating.  Hard to straight cath.  Had to use pediatric 8 FR    Orders Received:    Comments:

## 2022-04-25 NOTE — ANESTHESIA PREPROCEDURE EVALUATION
Anesthesia Pre-Procedure Evaluation    Patient: Tamar Rose   MRN: [id number] : 1935        Procedure : Procedure(s):  IRRIGATION AND DEBRIDEMENT, RIGHT NATIVE SHOULDER          Past Medical History:   Diagnosis Date     NO ACTIVE PROBLEMS       Past Surgical History:   Procedure Laterality Date     HYSTERECTOMY, MATT        Allergies   Allergen Reactions     Sulfa Drugs Rash      Social History     Tobacco Use     Smoking status: Never Smoker     Smokeless tobacco: Not on file   Substance Use Topics     Alcohol use: Yes     Comment: occ      Wt Readings from Last 1 Encounters:   22 59.1 kg (130 lb 6.4 oz)        Anesthesia Evaluation   Pt has had prior anesthetic.     No history of anesthetic complications       ROS/MED HX  ENT/Pulmonary:    (-) sleep apnea   Neurologic:    (-) no CVA   Cardiovascular:    (-) CAD   METS/Exercise Tolerance:     Hematologic:  - neg hematologic  ROS     Musculoskeletal:       GI/Hepatic: Comment: Nauseous   (-) GERD   Renal/Genitourinary:       Endo:    (-) Type II DM   Psychiatric/Substance Use:       Infectious Disease: Comment: Septic shoulder      Malignancy:       Other:            Physical Exam    Airway        Mallampati: II   TM distance: > 3 FB   Neck ROM: full   Mouth opening: > 3 cm    Respiratory Devices and Support         Dental  no notable dental history     (+) caps      Cardiovascular   cardiovascular exam normal          Pulmonary   pulmonary exam normal                OUTSIDE LABS:  CBC:   Lab Results   Component Value Date    WBC 15.6 (H) 2022    WBC 22.4 (H) 2022    HGB 10.6 (L) 2022    HGB 12.5 2022    HCT 33.2 (L) 2022    HCT 37.8 2022     2022     2022     BMP:   Lab Results   Component Value Date     2022     (L) 2022    POTASSIUM 4.2 2022    POTASSIUM 4.2 2022    CHLORIDE 110 (H) 2022    CHLORIDE 107 2022    CO2 21 2022    CO2  20 04/24/2022    BUN 14 04/25/2022    BUN 18 04/24/2022    CR 0.88 04/25/2022    CR 0.8 04/24/2022     (H) 04/25/2022     (H) 04/24/2022     COAGS: No results found for: PTT, INR, FIBR  POC: No results found for: BGM, HCG, HCGS  HEPATIC:   Lab Results   Component Value Date    ALBUMIN 1.9 (L) 04/25/2022    PROTTOTAL 5.4 (L) 04/25/2022    ALT 38 04/25/2022    AST 37 04/25/2022    ALKPHOS 88 04/25/2022    BILITOTAL 0.6 04/25/2022     OTHER:   Lab Results   Component Value Date    PH 7.28 (L) 04/24/2022    LACT 2.1 (H) 04/24/2022    MYRIAM 7.7 (L) 04/25/2022    .0 (H) 04/25/2022       Anesthesia Plan    ASA Status:  2   NPO Status:  NPO Appropriate    Anesthesia Type: General.     - Airway: ETT   Induction: Propofol, Intravenous, RSI.   Maintenance: Balanced.        Consents    Anesthesia Plan(s) and associated risks, benefits, and realistic alternatives discussed. Questions answered and patient/representative(s) expressed understanding.    - Discussed:     - Discussed with:  Patient         Postoperative Care    Pain management: Multi-modal analgesia.   PONV prophylaxis: Ondansetron (or other 5HT-3), Dexamethasone or Solumedrol, Background Propofol Infusion     Comments:                Paula Jung MD, MD

## 2022-04-25 NOTE — PROVIDER NOTIFICATION
Notified Dr. Kelley that patient is again retaining urine, second time needing to straight cath. Per MD, ok to leave carpenter in.

## 2022-04-25 NOTE — ANESTHESIA CARE TRANSFER NOTE
Patient: Tamar Rose    Procedure: Procedure(s):  IRRIGATION AND DEBRIDEMENT, RIGHT NATIVE SHOULDER       Diagnosis: Pyogenic arthritis of right shoulder region, due to unspecified organism (H) [M00.9]  Diagnosis Additional Information: No value filed.    Anesthesia Type:   General     Note:    Oropharynx: oropharynx clear of all foreign objects and spontaneously breathing  Level of Consciousness: awake  Oxygen Supplementation: face mask  Level of Supplemental Oxygen (L/min / FiO2): 6  Independent Airway: airway patency satisfactory and stable  Dentition: dentition unchanged  Vital Signs Stable: post-procedure vital signs reviewed and stable  Report to RN Given: handoff report given  Patient transferred to: PACU  Comments: Pt VSS and spont breathing. Confused baseline and seems to have returned to that state. Answering questions.   Handoff Report: Identifed the Patient, Identified the Reponsible Provider, Reviewed the pertinent medical history, Discussed the surgical course, Reviewed Intra-OP anesthesia mangement and issues during anesthesia, Set expectations for post-procedure period and Allowed opportunity for questions and acknowledgement of understanding      Vitals:  Vitals Value Taken Time   /79 04/25/22 1250   Temp 37.2 C    Pulse 99 04/25/22 1253   Resp 24 04/25/22 1253   SpO2 96 % 04/25/22 1253   Vitals shown include unvalidated device data.    Electronically Signed By: MORALES Zazueta CRNA  April 25, 2022  12:54 PM

## 2022-04-25 NOTE — PROGRESS NOTES
[facility]    Medicine Progress Note - Hospitalist Service    Date of Admission:  4/24/2022    Assessment & Plan            Tamar Rose is a 87 year old female admitted on 4/24/2022.  Past history of colon cancer, hyperlipidemia, osteoporosis, microscopic colitis, and iron deficiency anemia who presented to the ED with 3 days of nausea, diarrhea, and progressive right shoulder pain.  Arthrocentesis in ED concerning for septic joint.        Right septic arthritis of the shoulder, likely due to MSSA  Lactic acidosis  Per family, underwent steroid injection to shoulder 4/19/22 with subsequent increasing edema and pain refractory to PTA oxycodone.  * admission leukocytosis of 22 with lactate of 2.4;   * arthrocentesis 4/24 with turbid fluid and 4+ GPC on gram stain; does not appear cell count obtained  * 2/2 admission blood cultures positive for MSSA    - Orthopedic surgery recommending surgery  - N.p.o.   - pain uncontrolled, will add prn IV dilaudid and tylenol 1000mg tid  - follow up synovial fluid culture  - repeat blood cultures today and daily; consult ID for assistance with antibiotic management; continue vancomycin pending their input  - WBC improved to 15 today  - is low risk per RCRI, able to achieve 3-4 METS (walks for about 60 min 5day/week) with no exertional cardiorespiratory symptoms or recent changes in exertional capacity; is cleared to proceed with surgery    Possible pneumonia  Admission CXR demonstrating right basilar infiltrates, CT abd/pelvis with groundglass opacities of right base.  Treated for CAP with 7-day course of Ceftin starting 4/1/22, with resolution of productive cough.   - currently on room air    Urinary retention  Noted to be retaining urine with nearly 1000 ml out on straight cath.    - very difficult catheterization requiring 8 Fr cath which cannot be connected to any collection bag; will consult Urology     Right upper pole kidney  hypoattenuation-concern for infarction  No back or flank pain reported.  UA not congruent with infection.  Finding was noted on CT incidentally.    - renal US 4/24 wnl; lesion on CT not appreciated sonographically     Nausea, vomiting, and diarrhea   Correlating with progressive worsening of right shoulder, possible gastritis on CT.  No bloody or black BM reported.  - denies any symptoms currently     Hyponatremia, resolved  Admission Na 132, suspect hypovolemic.  Normalized with IVF.      Chronic conditions  Osteoporosis  FRANCESCO  Hyperlipidemia  History of colon cancer s/p right hemicolectomy 8/2017; in remission         Diet: NPO for Medical/Clinical Reasons Except for: Meds, Ice Chips    DVT Prophylaxis: Pneumatic Compression Devices  Coley Catheter: Not present  Central Lines: None  Cardiac Monitoring: None  Code Status: Full Code      Disposition Plan   Expected Discharge: >3 days pending clearance of bacteremia, post-op course, final antibiotic plan    Anticipated discharge location:  Awaiting care coordination huddle  Delays:            The patient's care was discussed with the Bedside Nurse, Patient and Patient's Family.    Christian Gonzalez MD  Hospitalist Service  [facility] [place]  Securely message with the Vocera Web Console (learn more here)  Text page via [employer] Paging/Directory         Clinically Significant Risk Factors Present on Admission             # Hypoalbuminemia: Albumin = 1.9 g/dL (Ref range: 3.4 - 5.0 g/dL) on admission, will monitor as appropriate          ______________________________________________________________________    Interval History   She reports no further nausea/vomiting.  Ongoing severe pain.  Denies any cardiac history including CAD or rhythm abnormalities.  Denies any pulmonary history or tobacco abuse.  Exercises 5 days weekly by walking about 60 minutes.  Denies any exertional cardiorespiratory symptoms or recent change in exertional capacity.     Data  reviewed today: I reviewed all medications, new labs and imaging results over the last 24 hours. I personally reviewed no images or EKG's today.    Physical Exam   Vital Signs: Temp: 98  F (36.7  C) Temp src: Oral BP: 90/46 Pulse: 100   Resp: 20 SpO2: 92 % O2 Device: None (Room air) Oxygen Delivery: 4 LPM  Weight: 130 lbs 6.4 oz  General Appearance: Thin female in NAD  Respiratory: lungs CTAB, no wheezes or crackles, no tachypnea   Cardiovascular: RRR, normal s1/s2 without murmur  GI: abdomen soft, normal bowel sounds  Skin: right shoulder erythematous  Other: Alert and appropriate, cranial nerves grossly intact      Data   Recent Labs   Lab 04/25/22  0754 04/24/22  1323 04/24/22  1322 04/24/22  1158   WBC 15.6*  --   --  22.4*   HGB 10.6*  --   --  12.5   MCV 97  --   --  94     --   --  260     --  132*  --    POTASSIUM 4.2  --  4.2  --    CHLORIDE 110*  --  107  --    CO2 21  --  20  --    BUN 14  --  18  --    CR 0.88 0.8 0.82  --    ANIONGAP 5  --  5  --    MYRIAM 7.7*  --  8.2*  --    *  --  148*  --    ALBUMIN 1.9*  --  2.2*  --    PROTTOTAL 5.4*  --  6.2*  --    BILITOTAL 0.6  --  0.5  --    ALKPHOS 88  --  67  --    ALT 38  --  33  --    AST 37  --  26  --

## 2022-04-25 NOTE — ED NOTES
Attempts by two RN's to place Coley cath, resistance met.  Straight cath placed to drain 800+cc found by bladder scan.  900cc drained. MD aware.

## 2022-04-25 NOTE — BRIEF OP NOTE
[facility]    Brief Operative Note    Pre-operative diagnosis: Pyogenic arthritis of right shoulder region, due to unspecified organism (H) [M00.9]  Post-operative diagnosis Same as pre-operative diagnosis    Procedure: Procedure(s):  IRRIGATION AND DEBRIDEMENT, RIGHT NATIVE SHOULDER  Surgeon: Surgeon(s) and Role:     * Baljinder Cline MD - Primary     * Madai Velasquez PA-C - Assisting  Anesthesia: General   Estimated Blood Loss: 5 CCs    Drains: None  Specimens:   ID Type Source Tests Collected by Time Destination   A : right shoulder joint Synovial fluid Shoulder, Right ANAEROBIC BACTERIAL CULTURE ROUTINE, GRAM STAIN, AEROBIC BACTERIAL CULTURE ROUTINE Baljinder Cline MD 4/25/2022 11:58 AM      Findings:   None.  Complications: None.  Implants: * No implants in log *     Plan:  Activity as tolerated to right shoulder  IV abx per ID  Will follow intra operative cultures  Keep dressing C/D/I for 48 hours; then okay to remove and shower

## 2022-04-25 NOTE — PROGRESS NOTES
RN's attempted to insert carpenter and straight cath multiple times without success.  Flyers paged to have them attempt.

## 2022-04-25 NOTE — PROGRESS NOTES
Patient straight cathed using an 8 Greek pediatric catheter.  900 drained, PVR showed 27cc.  Will continue to monitor.

## 2022-04-25 NOTE — PROGRESS NOTES
Pt A&O to self and situation. CMS intact-RUE edematous and scattered bruising throughout. VSS. Not OOB yet. Taking tylenol for pain. See previous note for voiding. Continue to monitor.

## 2022-04-25 NOTE — CONSULTS
[facility]    Infectious Disease Consultation     Date of Admission:  4/24/2022  Date of Consult : 04/25/22    Assessment:  87YF who is admitted with acute onset right shoulder pain/septic arthritis following steroid injection with high grade S.aureus MSSA sepsis of skin source. She also had pneumonia about 2 weeks ago treated with oral antibiotics with evidence of bilateral pulmonary opacities. Unclear whether bacteremia is secondary for staph pneumonia with seeding of shoulder or secondary to injection. She has a loud systolic murmur and will need further assessment for endocarditis given concern for renal infarct imaging.    -High grade MSSA sepsis  -Right shoulder septic arthritis with hx of steroid injection prior to onset of symptoms  -Recent pneumonia treated with oral antibiotics. CT shows bilateral ground glass opacities. Resolving pneumonia vs staph pneumonia as source for sepsis  -Systolic murmur  -Pseudogout with positive synovial fluid crystals for calcium pyrophosphate  -chronic medical conditions - Hx of colon cancer, osteoporosis, microscopic colitis, hyperlipidemia    Recommendations:  1. I&D right shoulder as planned  2. Repeat blood cxs X 2 sets  3. Echocardiogram  4. Discontinue Ceftriaxone, treat with cefazolin 2 grams Q8H  5. Follow cx sensitivities, follow clinical status and labs  6. Monitor for additional sites of seeding  7. Will need PICC line once blood cxs clear in anticipation of long term IV antibiotic therapy  Recommendations were discussed with the hospitalist service, ID will continue to follow    Enedina Nathan MD    Reason for Consult    I was asked to evaluate this patient for treatment recommendations for S.aureus MSSA sepsis with right shoulder septic arthritis    Primary Care Physician   Armida Betancourt    Chief Complaint   Nausea, and right shoulder pain    History is obtained from the patient and medical records    History of Present Illness   Tamar KEYS  Milton is a 87 year old female with hx of colon cancer, microscopic colitis and osteoporosis who has been hospitalized with S.aureus MSSA sepsis with right shoulder septic arthritis following steroid injection to the shoulder.    Patient reports chronic pain in both shoulders for which she received bilateral steroid injections on 4/19 at [facility]  following which she developed increased pain in her left shoulder. She was also treated 2 weeks ago with Cefdinir for pneumonia with resolution  of cough.     She was admitted with increasing right shoulder pain and is found to have high grade S.aurues MSSA bacteremia with 4/4 blood cxs positive and positive synovial fluid gram stain for GPC. She had marked leukocytosis of 22.4K on admission and imaging findings of a wedge shaped area of hypo attenuation of the upper pole of the right kidney concerning for evolving infarct (which was not seen on US), as well as bilateral pulmonary ground glass opacities    ID has been asked to assist with antibiotic management.  She denies pain in any other joint or back, she has no prosthetic joints , denies fever, chills or rigors.    Antimicrobial therapy  4/24 Ceftriaxone, azithromycin, Vancomycin    Past Medical History   I have reviewed this patient's medical history and updated it with pertinent information if needed.   Past Medical History:   Diagnosis Date     NO ACTIVE PROBLEMS        Past Surgical History   I have reviewed this patient's surgical history and updated it with pertinent information if needed.  Past Surgical History:   Procedure Laterality Date     HYSTERECTOMY, MATT         Prior to Admission Medications   Prior to Admission Medications   Prescriptions Last Dose Informant Patient Reported? Taking?   ibuprofen (ADVIL/MOTRIN) 200 MG tablet  at prn  Yes Yes   Sig: Take 200 mg by mouth every 4 hours as needed for mild pain   methylPREDNISolone (MEDROL DOSEPAK) 4 MG tablet therapy pack 4/24/2022 at AM  Yes Yes   Sig: Take by  mouth See Admin Instructions Follow Package Directions   oxyCODONE-acetaminophen (PERCOCET) 5-325 MG tablet 4/24/2022 at 0200  No Yes   Sig: Take 0.5-1 tablets by mouth every 6 hours as needed for pain   rosuvastatin (CRESTOR) 20 MG tablet 4/23/2022 at AM  Yes Yes   Sig: Take 20 mg by mouth daily      Facility-Administered Medications: None     Allergies   Allergies   Allergen Reactions     Sulfa Drugs Rash       Immunization History     There is no immunization history on file for this patient.    Social History   I have reviewed this patient's social history and updated it with pertinent information if needed. Tamar Rose  reports that she has never smoked. She does not have any smokeless tobacco history on file. She reports current alcohol use.    Family History   I have reviewed this patient's family history and updated it with pertinent information if needed.   Family History   Problem Relation Age of Onset     Cancer Maternal Grandmother        Review of Systems   The 10 point Review of Systems is negative other than noted in the HPI or here.     Physical Exam   Temp: 98  F (36.7  C) Temp src: Oral BP: 90/46 Pulse: 100   Resp: 20 SpO2: 92 % O2 Device: None (Room air) Oxygen Delivery: 4 LPM  Vital Signs with Ranges  Temp:  [97.1  F (36.2  C)-98.3  F (36.8  C)] 98  F (36.7  C)  Pulse:  [] 100  Resp:  [16-22] 20  BP: ()/(46-80) 90/46  SpO2:  [90 %-98 %] 92 %  130 lbs 6.4 oz  Body mass index is 20.42 kg/m .    GENERAL APPEARANCE:  awake  EYES: Eyes grossly normal to inspection  NECK: no adenopathy  RESP: lungs clear   CV: S1S2 , loud systolic murmur  ABDOMEN: soft, nontender  MS: right shoulder swelling and pain  SKIN: no suspicious lesions or rashes, no peripheral stigmata of infective endocarditis.    Data   All laboratory data reviewed  Recent Results (from the past 24 hour(s))   EKG 12-lead, tracing only    Collection Time: 04/24/22 11:51 AM   Result Value Ref Range    Systolic Blood Pressure   mmHg    Diastolic Blood Pressure  mmHg    Ventricular Rate 88 BPM    Atrial Rate 88 BPM    VA Interval 188 ms    QRS Duration 74 ms     ms    QTc 428 ms    P Axis 87 degrees    R AXIS 64 degrees    T Axis 71 degrees    Interpretation ECG       Sinus rhythm with Premature supraventricular complexes  Septal infarct , age undetermined  Abnormal ECG  No previous ECGs available  Confirmed by GENERATED REPORT, COMPUTER ([id number]),  LEONEL BERGER ([id number]) on 4/24/2022 11:55:08 AM     Lactic acid whole blood    Collection Time: 04/24/22 11:58 AM   Result Value Ref Range    Lactic Acid 2.4 (H) 0.7 - 2.0 mmol/L   CBC with platelets and differential    Collection Time: 04/24/22 11:58 AM   Result Value Ref Range    WBC Count 22.4 (H) 4.0 - 11.0 10e3/uL    RBC Count 4.01 3.80 - 5.20 10e6/uL    Hemoglobin 12.5 11.7 - 15.7 g/dL    Hematocrit 37.8 35.0 - 47.0 %    MCV 94 78 - 100 fL    MCH 31.2 26.5 - 33.0 pg    MCHC 33.1 31.5 - 36.5 g/dL    RDW 15.4 (H) 10.0 - 15.0 %    Platelet Count 260 150 - 450 10e3/uL    % Neutrophils 88 %    % Lymphocytes 5 %    % Monocytes 6 %    % Eosinophils 0 %    % Basophils 0 %    % Immature Granulocytes 1 %    NRBCs per 100 WBC 0 <1 /100    Absolute Neutrophils 19.8 (H) 1.6 - 8.3 10e3/uL    Absolute Lymphocytes 1.0 0.8 - 5.3 10e3/uL    Absolute Monocytes 1.4 (H) 0.0 - 1.3 10e3/uL    Absolute Eosinophils 0.0 0.0 - 0.7 10e3/uL    Absolute Basophils 0.1 0.0 - 0.2 10e3/uL    Absolute Immature Granulocytes 0.2 <=0.4 10e3/uL    Absolute NRBCs 0.0 10e3/uL   Comprehensive metabolic panel    Collection Time: 04/24/22  1:22 PM   Result Value Ref Range    Sodium 132 (L) 133 - 144 mmol/L    Potassium 4.2 3.4 - 5.3 mmol/L    Chloride 107 94 - 109 mmol/L    Carbon Dioxide (CO2) 20 20 - 32 mmol/L    Anion Gap 5 3 - 14 mmol/L    Urea Nitrogen 18 7 - 30 mg/dL    Creatinine 0.82 0.52 - 1.04 mg/dL    Calcium 8.2 (L) 8.5 - 10.1 mg/dL    Glucose 148 (H) 70 - 99 mg/dL    Alkaline Phosphatase 67 40 - 150 U/L    AST 26  0 - 45 U/L    ALT 33 0 - 50 U/L    Protein Total 6.2 (L) 6.8 - 8.8 g/dL    Albumin 2.2 (L) 3.4 - 5.0 g/dL    Bilirubin Total 0.5 0.2 - 1.3 mg/dL    GFR Estimate 69 >60 mL/min/1.73m2   Troponin I    Collection Time: 04/24/22  1:22 PM   Result Value Ref Range    Troponin I High Sensitivity 22 <54 ng/L   Blood Culture Line, venous    Collection Time: 04/24/22  1:22 PM    Specimen: Line, venous; Blood   Result Value Ref Range    Culture Positive on the 1st day of incubation (A)     Culture Gram positive cocci in clusters (AA)    Blood Culture Peripheral Blood    Collection Time: 04/24/22  1:22 PM    Specimen: Peripheral Blood   Result Value Ref Range    Culture Positive on the 1st day of incubation (A)     Culture Gram positive cocci in clusters (AA)    CRP inflammation    Collection Time: 04/24/22  1:22 PM   Result Value Ref Range    CRP Inflammation 296.0 (H) 0.0 - 8.0 mg/L   Verigene GP Panel    Collection Time: 04/24/22  1:22 PM    Specimen: Line, venous; Blood   Result Value Ref Range    Staphylococcus aureus Detected (A) Not Detected    Staphylococcus epidermidis Not Detected Not Detected    Staphylococcus lugdunensis Not Detected Not Detected    Enterococcus faecalis Not Detected Not Detected    Enterococcus faecium Not Detected Not Detected    Streptococcus species Not Detected Not Detected    Streptococcus agalactiae Not Detected Not Detected    Streptococcus anginosus group Not Detected Not Detected    Streptococcus pneumoniae Not Detected Not Detected    Streptococcus pyogenes Not Detected Not Detected    Listeria species Not Detected Not Detected   Creatinine POCT    Collection Time: 04/24/22  1:23 PM   Result Value Ref Range    Creatinine POCT 0.8 0.5 - 1.0 mg/dL    GFR, ESTIMATED POCT >60 >60 mL/min/1.73m2   iStat Gases (lactate) venous, POCT    Collection Time: 04/24/22  1:39 PM   Result Value Ref Range    Lactic Acid POCT 2.1 (H) <=2.0 mmol/L    Bicarbonate Venous POCT 23 21 - 28 mmol/L    O2 Sat, Venous  POCT 39 (L) 94 - 100 %    pCO2V Venous POCT 49 40 - 50 mm Hg    pH Venous POCT 7.28 (L) 7.32 - 7.43    pO2 Venous POCT 26 25 - 47 mm Hg   Asymptomatic COVID-19 Virus (Coronavirus) by PCR Nasopharyngeal    Collection Time: 04/24/22  3:20 PM    Specimen: Nasopharyngeal; Swab   Result Value Ref Range    SARS CoV2 PCR Negative Negative   UA with Microscopic    Collection Time: 04/24/22  4:36 PM   Result Value Ref Range    Color Urine Light Yellow Colorless, Straw, Light Yellow, Yellow    Appearance Urine Clear Clear    Glucose Urine Negative Negative mg/dL    Bilirubin Urine Negative Negative    Ketones Urine Negative Negative mg/dL    Specific Gravity Urine 1.023 1.003 - 1.035    Blood Urine Small (A) Negative    pH Urine 6.0 5.0 - 7.0    Protein Albumin Urine 10  (A) Negative mg/dL    Urobilinogen Urine Normal Normal, 2.0 mg/dL    Nitrite Urine Negative Negative    Leukocyte Esterase Urine Negative Negative    Mucus Urine Present (A) None Seen /LPF    RBC Urine 1 <=2 /HPF    WBC Urine 2 <=5 /HPF    Squamous Epithelials Urine <1 <=1 /HPF   Crystal ID Synovial Fluid    Collection Time: 04/24/22  5:03 PM   Result Value Ref Range    Crystals Analysis (A) No clinically significant crystals seen.     Positive for intracellular crystals, consistent with calcium pyrophosphate dihydrate crystals.   Gram stain    Collection Time: 04/24/22  5:03 PM    Specimen: Shoulder, Right; Synovial fluid   Result Value Ref Range    Gram Stain Result 4+ Gram positive cocci (A)     Gram Stain Result 4+ WBC seen (A)    Cell Count Body Fluid    Collection Time: 04/24/22  5:03 PM   Result Value Ref Range    Color Yellow Colorless, Yellow    Clarity Turbid (A) Clear, Bloody    Cell Count Fluid Source Joint, Other    Differential Body Fluid    Collection Time: 04/24/22  5:03 PM   Result Value Ref Range    % Neutrophils      % Lymphocytes      % Monocyte/Macrophages     Procalcitonin    Collection Time: 04/24/22  9:27 PM   Result Value Ref Range     Procalcitonin 1.72 (H) <0.05 ng/mL   Comprehensive metabolic panel    Collection Time: 04/25/22  7:54 AM   Result Value Ref Range    Sodium 136 133 - 144 mmol/L    Potassium 4.2 3.4 - 5.3 mmol/L    Chloride 110 (H) 94 - 109 mmol/L    Carbon Dioxide (CO2) 21 20 - 32 mmol/L    Anion Gap 5 3 - 14 mmol/L    Urea Nitrogen 14 7 - 30 mg/dL    Creatinine 0.88 0.52 - 1.04 mg/dL    Calcium 7.7 (L) 8.5 - 10.1 mg/dL    Glucose 110 (H) 70 - 99 mg/dL    Alkaline Phosphatase 88 40 - 150 U/L    AST 37 0 - 45 U/L    ALT 38 0 - 50 U/L    Protein Total 5.4 (L) 6.8 - 8.8 g/dL    Albumin 1.9 (L) 3.4 - 5.0 g/dL    Bilirubin Total 0.6 0.2 - 1.3 mg/dL    GFR Estimate 64 >60 mL/min/1.73m2   CBC with platelets    Collection Time: 04/25/22  7:54 AM   Result Value Ref Range    WBC Count 15.6 (H) 4.0 - 11.0 10e3/uL    RBC Count 3.43 (L) 3.80 - 5.20 10e6/uL    Hemoglobin 10.6 (L) 11.7 - 15.7 g/dL    Hematocrit 33.2 (L) 35.0 - 47.0 %    MCV 97 78 - 100 fL    MCH 30.9 26.5 - 33.0 pg    MCHC 31.9 31.5 - 36.5 g/dL    RDW 15.6 (H) 10.0 - 15.0 %    Platelet Count 244 150 - 450 10e3/uL   CRP inflammation    Collection Time: 04/25/22  7:54 AM   Result Value Ref Range    CRP Inflammation 260.0 (H) 0.0 - 8.0 mg/L     Microbiology  4/22 Blood cxs both sets 4/4 bottles positive  Peripheral Blood          0 Result Notes    Culture Positive on the 1st day of incubation Abnormal        Gram positive cocci in clusters Panic     2 of 2 bottles        Line, venous; Blood          0 Result Notes     Component Ref Range & Units 1 d ago     Staphylococcus aureus Not Detected Detected Abnormal     Comment: Positive for Staphylococcus aureus and negative for the mecA gene (not resistant to methicillin) by [facility] multiplex nucleic acid test. Final identification and antimicrobial susceptibility testing will be verified by standard methods.    Staphylococcus epidermidis Not Detected Not Detected     Staphylococcus lugdunensis Not Detected Not Detected     Enterococcus  faecalis Not Detected Not Detected     Enterococcus faecium Not Detected Not Detected     Streptococcus species Not Detected Not Detected     Streptococcus agalactiae Not Detected Not Detected     Streptococcus anginosus group Not Detected Not Detected     Streptococcus pneumoniae Not Detected Not Detected     Streptococcus pyogenes Not Detected Not Detected     Listeria species Not Detected Not Detected           4/24 R shoulder synovial fluid  Shoulder, Right; Synovial fluid          0 Result Notes    Gram Stain Result   Abnormal   4+ Gram positive cocci   Gram Stain slide reviewed at the Infectious Diseases Diagnostic Lab - [facility]   4+ WBC seen   Predominantly PMNs             Imaging  4/24 CXR  EXAM: XR CHEST PORT 1 VIEW  LOCATION: [facility]  DATE/TIME: 4/24/2022 8:08 PM     INDICATION: Sepsis.  COMPARISON: None.                                                                      IMPRESSION: Moderate infiltrates within the right lower lung most consistent with pneumonia. Additional mild infiltrates in the left lung base may be related. Normal heart size and pulmonary vascularity.    4/24 Ct abdomen pelvis  EXAM: CT ABDOMEN PELVIS W CONTRAST  LOCATION: [facility]  DATE/TIME: 4/24/2022 2:14 PM     INDICATION: Abdominal pain, acute, nonlocalized  COMPARISON: None.  TECHNIQUE: CT scan of the abdomen and pelvis was performed following injection of IV contrast. Multiplanar reformats were obtained. Dose reduction techniques were used.  CONTRAST: 60mL Isovue 370     FINDINGS:   LOWER CHEST: Bibasilar primarily groundglass opacities, most pronounced in the right lower lobe. No pleural effusion. Small hiatal hernia.     HEPATOBILIARY: Cholelithiasis without evidence of acute cholecystitis. No biliary obstruction.     PANCREAS: Normal.     SPLEEN: Normal.     ADRENAL GLANDS: Normal.     KIDNEYS/BLADDER: Wedge-shaped area of hypoenhancement in the upper pole of the right  kidney measuring up to 1.9 cm (coronal image 59). No hydronephrosis. Urinary bladder is distended but otherwise unremarkable.     BOWEL: Prior right hemicolectomy without evidence of bowel obstruction. There is mucosal hyperenhancement and submucosal edema involving the gastric antrum (series 4 image 55).     LYMPH NODES: Normal.     VASCULATURE: Ectasia of the infrarenal abdominal aorta measuring up to 2.7 cm with scattered calcified atherosclerosis.     PELVIC ORGANS: Prior hysterectomy.     MUSCULOSKELETAL: Prior vertebroplasty of L2, L3 and L5 with age-indeterminate compression deformity of L1. 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                                                                      IMPRESSION:   1.  Wedge-shaped area of hypoattenuation the upper pole of the right kidney. Given morphology, favor evolving infarction but consider correlation with urinalysis.  2.  Possible gastritis involving the distal stomach.  3.  Bibasilar groundglass pulmonary opacities, most pronounced in the right lower lobe, can be seen with aspiration and/or pneumonia.  4.  Multiple prior vertebroplasties with additional age-indeterminate compression deformity of L1.    4/24 Renal US  EXAM: US RENAL COMPLETE  LOCATION: [facility]  DATE/TIME: 4/24/2022 7:40 PM     INDICATION: Abdominal pain. Possible infarct upper pole right kidney seen on CT.   COMPARISON: CT abdomen and pelvis 04/24/2022  TECHNIQUE: Routine Bilateral Renal and Bladder Ultrasound.     FINDINGS:     RIGHT KIDNEY: 8.9 x 5.1 x 3.3 cm. Normal without hydronephrosis or masses.      LEFT KIDNEY: 9.5 x 5.0 x 5.4 cm. Normal without hydronephrosis or masses.      BLADDER: Normal.                                                                      IMPRESSION:  1.  Normal kidney ultrasound. Hypodense lesion upper pole right kidney seen on CT not appreciated sonographically.

## 2022-04-25 NOTE — PROGRESS NOTES
Ortho treatment plan    Patient with + gram stain and + crystals in right shoulder from aspiration    OR today for Native septic right shoulder with Dr. Cline  NPO   Pain medication as needed, limit narcotics as able  Pre-op optimization per hospitalist    Armida Mcnulty PAC

## 2022-04-25 NOTE — PROVIDER NOTIFICATION
Patient arrived on floor with full bladder from PACU. Per Rn in PACU, Marlyn and team attempted to place carpenter x2 during surgery and were unable. Per Marlyn, place carpenter once on the floor?    Called placed to Uroology team. Per colton Villanueva to place 8 Uzbek carpenter catheter and leave in place. Will follow-up.    Unable to place 8 Uzbek. RN resource able to put straight cath in and drained 600cc at 1700. attempted to change drain and secure carpenter but catheter fell out during change. Unable to get carpenter in place. Bladder scanned for 11cc at 1900. Will continue to monitor

## 2022-04-25 NOTE — PLAN OF CARE
Summary:  Admitted with R shoulder pain, also found to have R side PNA  DATE & TIME: 4/24/22-4/25/22 [id number]  Cognitive Concerns/ Orientation : Alert and oriented x1; patient very restless, unsure if its related to urinary retention or confusion.    BEHAVIOR & AGGRESSION TOOL COLOR: GREEN  CIWA SCORE: NA   ABNL VS/O2: VSS on RA, 92%.    MOBILITY: Lift due to pain, baseline walks with cane  PAIN MANAGMENT: Lots of pain in right shoulder, unable to verbalize pain level.    DIET: NPO  BOWEL/BLADDER: Patient continues to retain urine, was straight cathed in ED, but not able to insert a carpenter.  Multiple RN's on unit attempted to straight cath and insert a 12 [place] carpenter without success.  Patient cathed by ANS with an 8  gauge pediatric   ABNL LAB/BG: Na 132, Pro eliza 1.72, , WBC 22.4  DRAIN/DEVICES: IV x2;  ml/hr  TELEMETRY RHYTHM: ST with PVC's.     SKIN: Intact, but bruised and fragile skin  TESTS/PROCEDURES: Ortho consulted  D/C DAY/GOALS/PLACE: Admitted yesterday, pending ortho consult.    OTHER IMPORTANT INFO:

## 2022-04-25 NOTE — PROGRESS NOTES
RECEIVING UNIT ED HANDOFF REVIEW    ED Nurse Handoff Report was reviewed by: Shanthi Yates RN on April 24, 2022 at 7:09 PM

## 2022-04-26 NOTE — PROGRESS NOTES
[facility]    Infectious Disease Progress Note    Date of Service : 04/26/2022     Assessment:  87YF who is admitted with acute onset right shoulder pain/septic arthritis following steroid injection with high grade S.aureus MSSA sepsis of skin source. She also had pneumonia about 2 weeks ago treated with oral antibiotics with ongoing  bilateral pulmonary opacities. Unclear whether bacteremia is secondary to staph pneumonia with seeding of shoulder or secondary to joint injection. She has a loud systolic murmur and echocardiogram is awaited to assess for endocarditis given concern for renal infarct imaging.     -High grade MSSA sepsis  -Right shoulder septic arthritis with hx of steroid injection prior to onset of symptoms. S/p arthroscopic debridement  -Recent pneumonia treated with oral antibiotics. CT shows bilateral ground glass opacities. Resolving pneumonia vs staph pneumonia as source for sepsis  -Systolic murmur  -Pseudogout with positive synovial fluid crystals for calcium pyrophosphate  -chronic medical conditions - Hx of colon cancer, osteoporosis, microscopic colitis, hyperlipidemia     Recommendations:  1. Follow repeat blood cxs and cx sensitivities  2. Await TTE  3. PICC once blood cxs are negative for 48 hours , will need long term IV antibiotics at discharge  4. Treat with Cefazolin  Monitor for additional sites of seeding    Enedina Nathan MD    Interval History   Resting, feels ok, no pain at rest. Tolerating antibiotics without side effects    Antimicrobial therapy  4/25 Cefazolin    Physical Exam   Temp: 98  F (36.7  C) Temp src: Oral BP: 109/68 Pulse: 89   Resp: 16 SpO2: 96 % O2 Device: None (Room air) Oxygen Delivery: 2 LPM  Vitals:    04/24/22 1202 04/24/22 2100   Weight: 59 kg (130 lb) 59.1 kg (130 lb 6.4 oz)     Vital Signs with Ranges  Temp:  [97  F (36.1  C)-100.9  F (38.3  C)] 98  F (36.7  C)  Pulse:  [] 89  Resp:  [13-22] 16  BP: ()/() 109/68  FiO2  (%):  [98 %] 98 %  SpO2:  [92 %-100 %] 96 %      GENERAL APPEARANCE:  awake  EYES: Eyes grossly normal to inspection  NECK: no adenopathy  RESP: lungs clear   CV: S1S2 , loud systolic murmur  ABDOMEN: soft, nontender  MS: right shoulder in surgical dressing  SKIN: no suspicious lesions or rashes, no peripheral stigmata of infective endocarditis.    Other:    Medications     lactated ringers Stopped (04/26/22 0649)       acetaminophen  975 mg Oral Q8H     aspirin  81 mg Oral BID     ceFAZolin  2 g Intravenous Q8H     polyethylene glycol  17 g Oral Daily     senna-docusate  1 tablet Oral BID    Or     senna-docusate  2 tablet Oral BID     sodium chloride (PF)  3 mL Intracatheter Q8H     sodium chloride (PF)  3 mL Intracatheter Q8H       Data   All microbiology laboratory data reviewed.  Recent Labs   Lab Test 04/25/22  0754 04/24/22  1158   WBC 15.6* 22.4*   HGB 10.6* 12.5   HCT 33.2* 37.8   MCV 97 94    260     Recent Labs   Lab Test 04/25/22  0754 04/24/22  1323 04/24/22  1322   CR 0.88 0.8 0.82     Microbiology  4/22 Blood cxs both sets 4/4 bottles positive  Line, venous; Blood          0 Result Notes    Culture Positive on the 1st day of incubation Abnormal        Staphylococcus aureus Panic     2 of 2 bottles         Line, venous; Blood         0 Result Notes     Component Ref Range & Units 2 d ago     Staphylococcus aureus Not Detected Detected Abnormal     Comment: Positive for Staphylococcus aureus and negative for the mecA gene (not resistant to methicillin) by [employer] multiplex nucleic acid test. Final identification and antimicrobial susceptibility testing will be verified by standard methods.    Staphylococcus epidermidis Not Detected Not Detected     Staphylococcus lugdunensis Not Detected Not Detected     Enterococcus faecalis Not Detected Not Detected     Enterococcus faecium Not Detected Not Detected     Streptococcus species Not Detected Not Detected     Streptococcus agalactiae Not Detected  Not Detected     Streptococcus anginosus group Not Detected Not Detected     Streptococcus pneumoniae Not Detected Not Detected     Streptococcus pyogenes Not Detected Not Detected     Listeria species Not Detected Not Detected         4/24 R shoulder synovial fluid  Shoulder, Right; Synovial fluid          0 Result Notes    Culture Culture in progress       4+ Staphylococcus aureus Abnormal         Imaging  4/24 CXR  EXAM: XR CHEST PORT 1 VIEW  LOCATION: [facility]  DATE/TIME: 4/24/2022 8:08 PM     INDICATION: Sepsis.  COMPARISON: None.                                                                      IMPRESSION: Moderate infiltrates within the right lower lung most consistent with pneumonia. Additional mild infiltrates in the left lung base may be related. Normal heart size and pulmonary vascularity.     4/24 Ct abdomen pelvis  EXAM: CT ABDOMEN PELVIS W CONTRAST  LOCATION: [facility]  DATE/TIME: 4/24/2022 2:14 PM     INDICATION: Abdominal pain, acute, nonlocalized  COMPARISON: None.  TECHNIQUE: CT scan of the abdomen and pelvis was performed following injection of IV contrast. Multiplanar reformats were obtained. Dose reduction techniques were used.  CONTRAST: 60mL Isovue 370     FINDINGS:   LOWER CHEST: Bibasilar primarily groundglass opacities, most pronounced in the right lower lobe. No pleural effusion. Small hiatal hernia.     HEPATOBILIARY: Cholelithiasis without evidence of acute cholecystitis. No biliary obstruction.     PANCREAS: Normal.     SPLEEN: Normal.     ADRENAL GLANDS: Normal.     KIDNEYS/BLADDER: Wedge-shaped area of hypoenhancement in the upper pole of the right kidney measuring up to 1.9 cm (coronal image 59). No hydronephrosis. Urinary bladder is distended but otherwise unremarkable.     BOWEL: Prior right hemicolectomy without evidence of bowel obstruction. There is mucosal hyperenhancement and submucosal edema involving the gastric  antrum (series 4 image 55).     LYMPH NODES: Normal.     VASCULATURE: Ectasia of the infrarenal abdominal aorta measuring up to 2.7 cm with scattered calcified atherosclerosis.     PELVIC ORGANS: Prior hysterectomy.     MUSCULOSKELETAL: Prior vertebroplasty of L2, L3 and L5 with age-indeterminate compression deformity of L1. 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                                                                      IMPRESSION:   1.  Wedge-shaped area of hypoattenuation the upper pole of the right kidney. Given morphology, favor evolving infarction but consider correlation with urinalysis.  2.  Possible gastritis involving the distal stomach.  3.  Bibasilar groundglass pulmonary opacities, most pronounced in the right lower lobe, can be seen with aspiration and/or pneumonia.  4.  Multiple prior vertebroplasties with additional age-indeterminate compression deformity of L1.     4/24 Renal US  EXAM: US RENAL COMPLETE  LOCATION: [facility]  DATE/TIME: 4/24/2022 7:40 PM     INDICATION: Abdominal pain. Possible infarct upper pole right kidney seen on CT.   COMPARISON: CT abdomen and pelvis 04/24/2022  TECHNIQUE: Routine Bilateral Renal and Bladder Ultrasound.     FINDINGS:     RIGHT KIDNEY: 8.9 x 5.1 x 3.3 cm. Normal without hydronephrosis or masses.      LEFT KIDNEY: 9.5 x 5.0 x 5.4 cm. Normal without hydronephrosis or masses.      BLADDER: Normal.                                                                      IMPRESSION:  1.  Normal kidney ultrasound. Hypodense lesion upper pole right kidney seen on CT not appreciated sonographically.

## 2022-04-26 NOTE — PROGRESS NOTES
UROLOGY    Reviewed with RN. Pt had spontaneous void of 375cc with PVR ~300cc. No need to straight cath. Follow PVRs and straight cath with 8Fr if >500cc. Suspect she will get back to her baseline with improved pain control.    Elle Phelps PA-C  [facility]  [phone]

## 2022-04-26 NOTE — PROGRESS NOTES
[facility]    Medicine Progress Note - Hospitalist Service    Date of Admission:  4/24/2022    Assessment & Plan              Tamar Rose is a 87 year old female admitted on 4/24/2022.  Past history of colon cancer, hyperlipidemia, osteoporosis, microscopic colitis, and iron deficiency anemia who presented to the ED with 3 days of nausea, diarrhea, and progressive right shoulder pain.  Arthrocentesis in ED concerning for septic joint.        Right septic arthritis of the shoulder due to MSSA with bacteremia s/p I&D 4/25  Pseudogout  Lactic acidosis  Per family, underwent steroid injection to shoulder 4/19/22 with subsequent increasing edema and pain refractory to PTA oxycodone.  * admission leukocytosis of 22 with lactate of 2.4;   * arthrocentesis 4/24 with turbid fluid with Staph aureus on culture; also noted calcium pyrophosphate crystals  * 2/2 admission blood cultures positive for MSSA    - post-op management per Orthopedic surgery  - continue Ancef per ID   - TTE pending  - tylenol 1000mg tid, prn oxycodone and prn IV dilaudid  - follow up synovial fluid sensitivities; anticipate MSSA  - intra-op cultures pending  - repeat blood cultures 4/25 and 4/26 ngtd; follow daily  - leukocytosis stable, CRP trending down 4/26  - feeling mildly lightheaded, will infuse additional 1L LR    Possible pneumonia  Admission CXR demonstrating right basilar infiltrates, CT abd/pelvis with groundglass opacities of right base.  Treated for CAP with 7-day course of Ceftin starting 4/1/22, with resolution of productive cough.  Unclear if admission imaging reflects resolved PNA vs new MSSA PNA.   - currently on room air  - Ancef as above    Acute blood loss anemia  - admission hgb 12.5 >> 9.5 on 4/26; likely component dilution as well  - follow CBC    Urinary retention  Noted to be retaining urine with nearly 1000 ml out on straight cath.    * very difficult catheterization requiring 8 Fr cath  following admission  - unfortunately, carpenter was removed in PACU 4/25  - per Urology, follow PVR and straight cath with 8Fr if >500     Right upper pole kidney hypoattenuation-concern for infarction  No back or flank pain reported.  UA not congruent with infection.  Finding was noted on CT incidentally.  Renal US 4/24 wnl; lesion on CT not appreciated sonographically.     Nausea, vomiting, and diarrhea  Correlating with progressive worsening of right shoulder, possible gastritis on CT.  No bloody or black BM reported.  - mild nausea today, denies diarrhea     Hyponatremia, resolved  Admission Na 132, suspect hypovolemic.  Normalized with IVF.     Platelet defect, present at admission  On aspirin PTA     Chronic conditions  Osteoporosis  FRANCESCO  Hyperlipidemia: continue PTA statin  History of colon cancer s/p right hemicolectomy 8/2017; in remission         Diet: Advance Diet as Tolerated: Regular Diet Adult    DVT Prophylaxis: Pneumatic Compression Devices  Carpenter Catheter: Not present  Central Lines: None  Cardiac Monitoring: None  Code Status: Full Code      Disposition Plan   Expected Discharge: >3 days pending clearance of bacteremia, post-op course, final antibiotic plan    Anticipated discharge location:  Awaiting care coordination huddle  Delays:            The patient's care was discussed with the Bedside Nurse, Patient and Patient's Family.    Christian Gonzalez MD  Hospitalist Service  [facility]  Securely message with the [employer] Web Console (learn more here)  Text page via [employer] Paging/Directory         Clinically Significant Risk Factors Present on Admission                  ______________________________________________________________________    Interval History   Feels mildly lightheaded with mild nausea.  Pain adequately controlled.  Feeling weak.  No diarrhea.  No other complaints.      Data reviewed today: I reviewed all medications, new labs and imaging results over the last 24  hours. I personally reviewed no images or EKG's today.    Physical Exam   Vital Signs: Temp: 97.5  F (36.4  C) Temp src: Oral BP: 122/69 Pulse: 84   Resp: 16 SpO2: 94 % O2 Device: None (Room air) Oxygen Delivery: 2 LPM  Weight: 130 lbs 6.4 oz     General Appearance: Thin female in NAD  Respiratory: lungs CTAB, no wheezes or crackles, no tachypnea   Cardiovascular: RRR, normal s1/s2 with 2/6 systolic murmur (chronic per patient)  GI: abdomen soft, normal bowel sounds  Skin:  Other: Alert and appropriate, cranial nerves grossly intact      Data   Recent Labs   Lab 04/26/22  0831 04/25/22  0754 04/24/22  1323 04/24/22  1322 04/24/22  1158   WBC 17.2* 15.6*  --   --  22.4*   HGB 9.5* 10.6*  --   --  12.5   MCV 91 97  --   --  94    244  --   --  260    136  --  132*  --    POTASSIUM 4.0 4.2  --  4.2  --    CHLORIDE 109 110*  --  107  --    CO2 21 21  --  20  --    BUN 15 14  --  18  --    CR 0.81 0.88 0.8 0.82  --    ANIONGAP 6 5  --  5  --    MYRIAM 8.0* 7.7*  --  8.2*  --    * 110*  --  148*  --    ALBUMIN  --  1.9*  --  2.2*  --    PROTTOTAL  --  5.4*  --  6.2*  --    BILITOTAL  --  0.6  --  0.5  --    ALKPHOS  --  88  --  67  --    ALT  --  38  --  33  --    AST  --  37  --  26  --

## 2022-04-26 NOTE — PROGRESS NOTES
Paged the on call hospitalist regarding pt.'s right shoulder synovial fluid lab result for 2+ gram positive cocci.

## 2022-04-26 NOTE — PROGRESS NOTES
A&O x3, disoriented to time. Bladder scan @02:28 for 313 mL. Attempted to call urology x2 to start carpenter, and still awaiting for call back. PO encouraged. IVF overnight & is now saline lock. C/O minimal to no pain except when turning & repo in bed, ice to shoulder & scheduled tylenol for pain managment. Turn & repo Q2h & per pt.'s comfort. CMS intact. Dressing CDI. Updated daughter (Viktoriya) this morning @06:45. Slept between cares. Will cont' to monitor.

## 2022-04-26 NOTE — PROGRESS NOTES
"   04/26/22 1042   Quick Adds   Type of Visit Initial Occupational Therapy Evaluation   Living Environment   People in Home child(nohemy), adult   Current Living Arrangements house   Home Accessibility stairs within home   Number of Stairs, Within Home, Primary greater than 10 stairs   Stair Railings, Within Home, Primary railing on left side (ascending)   Transportation Anticipated car, drives self;family or friend will provide   Living Environment Comments son works during the day   Self-Care   Usual Activity Tolerance good   Current Activity Tolerance moderate   Regular Exercise Yes   Activity/Exercise Type other (see comments)  (stretching)   Exercise Amount/Frequency 3-5 times/wk   Equipment Currently Used at Home none   Fall history within last six months no   Activity/Exercise/Self-Care Comment was I with ADLs and functional mobility   Instrumental Activities of Daily Living (IADL)   Previous Responsibilities shopping;laundry;meal prep;housekeeping;medication management;driving;finances   General Information   Onset of Illness/Injury or Date of Surgery 04/24/22   Referring Physician Madai Velasquez PA-C   Patient/Family Therapy Goal Statement (OT) \"I feel like such an invalid now\"   Additional Occupational Profile Info/Pertinent History of Current Problem pt s/p I &D for septic R shoulder   Performance Patterns (Routines, Roles, Habits) was I with ADLs and mobility prior to admission   Existing Precautions/Restrictions fall   General Observations and Info agreeable to OT eval   Cognitive Status Examination   Orientation Status orientation to person, place and time   Affect/Mental Status (Cognitive) flat/blunted affect   Cognitive Status Comments pt states she feels foggy and not as sharp as her normal self. states she is seeing \"all kinds of things\"   Visual Perception   Visual Impairment/Limitations WNL   Sensory   Sensory Quick Adds No deficits were identified   Pain Assessment   Patient Currently in Pain " Yes, see Vital Sign flowsheet   Integumentary/Edema   Integumentary/Edema Comments significant R UE swelling in hand and forarm   Range of Motion Comprehensive   Comment, General Range of Motion R UE NT due to shoulder pain. R elbow to wrist WFL. L UE WFL   Strength Comprehensive (MMT)   Comment, General Manual Muscle Testing (MMT) Assessment R NT   Muscle Tone Assessment   Muscle Tone Quick Adds No deficits were identified   Bed Mobility   Comment (Bed Mobility) SBA   Balance   Balance Assessment no deficits were identified   Activities of Daily Living   BADL Assessment/Intervention lower body dressing;toileting   Lower Body Dressing Assessment/Training   [place] Level (Lower Body Dressing) moderate assist (50% patient effort)   Toileting   Comment, (Toileting) became too dizzy   [place] Level (Toileting) unable to perform   Clinical Impression   Criteria for Skilled Therapeutic Interventions Met (OT) Yes, treatment indicated   OT Diagnosis decreased I with ADL's and functional mobility   Influenced by the following impairments decreased activity tolerance, ADLs, functional mobility and cognition   OT Problem List-Impairments impacting ADL problems related to;activity tolerance impaired;cognition;balance;pain;range of motion (ROM)   Assessment of Occupational Performance 3-5 Performance Deficits   Identified Performance Deficits decraesed dressing, home mgmt, BR transfers   Planned Therapy Interventions (OT) ADL retraining;balance training;bed mobility training;cognition;strengthening;transfer training;progressive activity/exercise   Clinical Decision Making Complexity (OT) moderate complexity   Risk & Benefits of therapy have been explained evaluation/treatment results reviewed;care plan/treatment goals reviewed;current/potential barriers reviewed;participants voiced agreement with care plan;participants included;risks/benefits reviewed;patient   OT Discharge Planning   OT Discharge Recommendation (DC  Rec) Transitional Care Facility   OT Rationale for DC Rec pt completed OT eval, at baseline she lives with her son in the lower level. he is gone during the day for work. she was I with ADL's and IADL's including driving prior to admission. currently very low activity tolerance, unable to tolerate OOB due to dizziness. pt reports hallucinating and felling like she isn't thinking clearly. at this time pt would benefit from TCU at discharge   OT Goals   Therapy Frequency (OT) 5 times/wk   OT Predicted Duration/Target Date for Goal Attainment 05/03/22   OT Goals Hygiene/Grooming;Lower Body Dressing;Toilet Transfer/Toileting;Transfers;Cognition;OT Goal 1   OT: Hygiene/Grooming modified independent;while standing   OT: Lower Body Dressing Modified independent;including set-up/clothing retrieval   OT: Transfer Modified independent  (tub transfer)   OT: Toilet Transfer/Toileting Modified independent;toilet transfer;cleaning and garment management   OT: Cognitive Patient/caregiver will verbalize understanding of cognitive assessment results/recommendations as needed for safe discharge planning   OT: Goal 1 demo I with shoulder ex's

## 2022-04-26 NOTE — PROGRESS NOTES
Right shoulder synovial fluid growing gram positive cocci. Patient is already on Cefazolin 2 gm q8 hours. Prior cultures were MSSA.

## 2022-04-26 NOTE — PLAN OF CARE
Goal Outcome Evaluation:    Plan of Care Reviewed With: patient      A&O, slight forgetful.  CMS intact. LUE swelling, elevated on pillow. Pain managed with schedule tylenol. Up with 1 to BSC. Voiding in BSC, PVR greater than 500cc notify urology.  Continue to monitor.       PIV not working, pt hard to stick. Vascular access consult in.

## 2022-04-26 NOTE — PROGRESS NOTES
Orthopedic Surgery  Tamar Rose  2022  Admit Date:  2022  POD 1 Day Post-Op  S/P Procedure(s):  IRRIGATION AND DEBRIDEMENT, RIGHT NATIVE SHOULDER    Patient resting sitting at edge of bed.    Pain controlled.  Tolerating oral intake.    Denies nausea or vomiting  Denies chest pain or shortness of breath  No events overnight.      Alert and orient to person, place, and situation  Vital Sign Ranges  Temperature Temp  Av.3  F (36.3  C)  Min: 97  F (36.1  C)  Max: 98  F (36.7  C)   Blood pressure Systolic (24hrs), Av , Min:89 , Max:123        Diastolic (24hrs), Av, Min:52, Max:73      Pulse Pulse  Av.1  Min: 79  Max: 97   Respirations Resp  Av.8  Min: 13  Max: 22   Pulse oximetry SpO2  Av.4 %  Min: 92 %  Max: 100 %       Dressing is clean, dry, and intact.   Sensation intact in upper arm over biceps as well as in hand r/m/u nerve distribution  Able to abduct and oppose right thumb  Able to flex and extend right wrist and elbow  +Radial pulse  +cap refill     Labs:  Recent Labs   Lab Test 22  0831 22  0754 22  1322   POTASSIUM 4.0 4.2 4.2     Recent Labs   Lab Test 22  0831 22  0754 22  1158   HGB 9.5* 10.6* 12.5     No results for input(s): INR in the last 19985 hours.  Recent Labs   Lab Test 22  0831 22  0754 22  1158    244 260       A/P  1. S/p right native shoulder infection arthroscopic I&D   Continue amb and SCDs for DVT prophylaxis.     Mobilize with PT/OT    WBAT    Sling ordered for comfort   IV abx per ID.     Continue current pain regiment.    Armida Mcnulty PA-C

## 2022-04-26 NOTE — PROVIDER NOTIFICATION
MD Notification    Notified Person: MD    Notified Person Name:ID Dr. Nathan    Notification Date/Time:04/26/22 6303    Notification Interaction:phone    Purpose of Notification:postive culture - R-shoulder synovial fluid 2+ staphyloccus aureus for Aerobic culture     Orders Received:NO CHANGE    Comments:

## 2022-04-27 NOTE — PLAN OF CARE
"Goal Outcome Evaluation: Progressing    Plan of Care Reviewed With: patient, daughter     Overall Patient Progress: improving     Date/Time: 4/26 3-11:30 shift    Trauma/Ortho/Medical (Choose one) Ortho    Diagnosis: Septic R shoulder  POD#: 1 from I&D  Mental Status: A&O x4, forgetful. Stated today she is \"seeing things\" but does not elaborate and is not concerned.   Activity/dangle: Ax1 pivot transfer to commode, or Ax2 to walk  Diet: Regular  Pain: Denies  Coley/Voiding: Voided this shift, retains urine, monitoring closely  Tele/Restraints/Iso: No  02/LDA: No  D/C Date: Estimated 3 days        "

## 2022-04-27 NOTE — PROGRESS NOTES
"CM team aware of consult.  Due to ID note indicating   \"PICC once blood cxs are negative for 48 hours , will need long term IV antibiotics at discharge\"   IV antibiotics are provided either in 1) outpatient infusion center if 1x/day, 2) home infusion - pt would be taught to self-administer, 3) skilled nursing facility.     Requested benefit check from [facility].  To find out where infusion services would be covered under pt's insurance.  "

## 2022-04-27 NOTE — PROGRESS NOTES
X-cover    Notified of blood cx from 4/26 am with [facility] in clusters. On ancef and being followed by ID.     Luis Suarez MD

## 2022-04-27 NOTE — PROGRESS NOTES
4/26/22 [id number]  Pt AOX forgetful at times, scheduled tylenol given for L shoulder pain. Pt Up to bedside commode x2 with walker urinating spontaneously. PVR less than 500. Will continue to monitor.

## 2022-04-27 NOTE — PLAN OF CARE
Goal Outcome Evaluation:    Plan of Care Reviewed With: patient, daughter  Pt. A&o, vss, up with one assist and walker, voiding adequate amount at BSC,  denied any pain, TUMS given for c/o heart burn with relief, dressing to RUE CDI,  Plan discharge to TCU on 4/29 at 11 am. Will continue to monitor.

## 2022-04-27 NOTE — PROGRESS NOTES
[facility]    Medicine Progress Note - Hospitalist Service    Date of Admission:  4/24/2022    Assessment & Plan          Assessment & Plan    Tamar Rose is a 87 year old female admitted on 4/24/2022.  Past history of colon cancer, hyperlipidemia, osteoporosis, microscopic colitis, and iron deficiency anemia who presented to the ED with 3 days of nausea, diarrhea, and progressive right shoulder pain.  Arthrocentesis in ED concerning for septic joint.         Right septic arthritis of the shoulder due to MSSA with bacteremia s/p I&D 4/25  Pseudogout  Lactic acidosis  Per family, underwent steroid injection to shoulder 4/19/22 with subsequent increasing edema and pain refractory to PTA oxycodone.  * admission leukocytosis of 22 with lactate of 2.4;   * arthrocentesis 4/24 with turbid fluid with Staph aureus on culture; also noted calcium pyrophosphate crystals  * 2/2 admission blood cultures positive for MSSA     - post-op management per Orthopedic surgery  - continue Ancef per ID   - TTE pending  - tylenol 1000mg tid, prn oxycodone and prn IV dilaudid  - follow up synovial fluid sensitivities; anticipate MSSA  - intra-op cultures pending  - repeat blood cultures 4/25 and 4/26 ngtd; follow daily  - leukocytosis stable, CRP trending down 4/26  - feeling mildly lightheaded, will infuse additional 1L LR     Possible pneumonia  Admission CXR demonstrating right basilar infiltrates, CT abd/pelvis with groundglass opacities of right base.  Treated for CAP with 7-day course of Ceftin starting 4/1/22, with resolution of productive cough.  Unclear if admission imaging reflects resolved PNA vs new MSSA PNA.   - currently on room air  - Ancef as above     Acute blood loss anemia  - admission hgb 12.5 >> 9.5 on 4/26; likely component dilution as well  - follow CBC     Urinary retention  Noted to be retaining urine with nearly 1000 ml out on straight cath.    * very difficult catheterization  requiring 8 Fr cath following admission  - unfortunately, carpenter was removed in PACU 4/25  - per Urology, follow PVR and straight cath with 8Fr if >500     Right upper pole kidney hypoattenuation-concern for infarction  No back or flank pain reported.  UA not congruent with infection.  Finding was noted on CT incidentally.  Renal US 4/24 wnl; lesion on CT not appreciated sonographically.     Nausea, vomiting, and diarrhea  Correlating with progressive worsening of right shoulder, possible gastritis on CT.  No bloody or black BM reported.  - mild nausea today, denies diarrhea     Hyponatremia, resolved  Admission Na 132, suspect hypovolemic.  Normalized with IVF.      Chronic conditions  Osteoporosis  FRANCESCO  Hyperlipidemia: continue PTA statin  History of colon cancer s/p right hemicolectomy 8/2017; in remission            Diet: Advance Diet as Tolerated: Regular Diet Adult    DVT Prophylaxis: Pneumatic Compression Devices  Carpenter Catheter: Not present  Central Lines: None  Cardiac Monitoring: None  Code Status: Full Code      Disposition Plan   Expected Discharge: 04/29/2022     Anticipated discharge location:  Awaiting care coordination huddle  Delays:          The patient's care was discussed with the Patient.    Kaylyn Chase MD  Hospitalist Service  [facility]  Securely message with the Vocera Web Console (learn more here)  Text page via [employer] Paging/Directory         Clinically Significant Risk Factors Present on Admission                 ______________________________________________________________________    Interval History   Patient laying in bed, complains short shoulder pain itching this morning.  Also continues to have abdominal upset, denies any diarrhea vomiting today.     Data reviewed today: I reviewed all medications, new labs and imaging results over the last 24 hours. I personally reviewed no images or EKG's today.    Physical Exam   Vital Signs: Temp: (P) 97.7  F (36.5  C)  Temp src: (P) Oral BP: (P) 106/56 Pulse: (P) 78   Resp: (P) 18 SpO2: 92 % O2 Device: (P) None (Room air)    Weight: 130 lbs 6.4 oz  General Appearance: Well appearing for stated age.  Respiratory: CTAB, no rales or ronchi  Cardiovascular: S1, S2 normal, no murmurs  GI: non-tender on palpation, BS present      Data   Recent Labs   Lab 04/27/22  0722 04/27/22  0555 04/26/22  0831 04/25/22  0754 04/24/22  1323 04/24/22  1322   WBC 18.2*  --  17.2* 15.6*  --   --    HGB 10.0*  --  9.5* 10.6*  --   --    MCV 93  --  91 97  --   --      --  239 244  --   --    NA  --   --  136 136  --  132*   POTASSIUM  --   --  4.0 4.2  --  4.2   CHLORIDE  --   --  109 110*  --  107   CO2  --   --  21 21  --  20   BUN  --   --  15 14  --  18   CR  --   --  0.81 0.88 0.8 0.82   ANIONGAP  --   --  6 5  --  5   MYRIAM  --   --  8.0* 7.7*  --  8.2*   GLC  --  107* 100* 110*  --  148*   ALBUMIN  --   --   --  1.9*  --  2.2*   PROTTOTAL  --   --   --  5.4*  --  6.2*   BILITOTAL  --   --   --  0.6  --  0.5   ALKPHOS  --   --   --  88  --  67   ALT  --   --   --  38  --  33   AST  --   --   --  37  --  26

## 2022-04-27 NOTE — CONSULTS
Care Management Initial Consult    General Information  Assessment completed with: Viktoriya Bennett (daughter)  Type of CM/SW Visit: Initial Assessment    Primary Care Provider verified and updated as needed: Yes   Readmission within the last 30 days:        Reason for Consult: discharge planning  Advance Care Planning: Advance Care Planning Reviewed: other (see comments) (No acp docs)          Communication Assessment  Patient's communication style: spoken language (English or Bilingual)    Hearing Difficulty or Deaf: no   Wear Glasses or Blind: no    Cognitive  Cognitive/Neuro/Behavioral: .WDL except, level of consciousness  Level of Consciousness: confused  Arousal Level: opens eyes spontaneously  Orientation: oriented x 4  Mood/Behavior: calm, cooperative  Best Language: 0 - No aphasia  Speech: clear, logical    Living Environment:   People in home: child(nohemy), adult     Current living Arrangements: house      Able to return to prior arrangements: yes       Family/Social Support:  Care provided by: self  Provides care for: no one  Marital Status: Single  Children          Description of Support System: Involved, Supportive    Support Assessment: Adequate family and caregiver support, Adequate social supports    Current Resources:   Patient receiving home care services: No     Community Resources: None  Equipment currently used at home: walker, rolling  Supplies currently used at home: None    Employment/Financial:  Employment Status: retired        Financial Concerns:             Lifestyle & Psychosocial Needs:  Social Determinants of Health     Tobacco Use: Unknown     Smoking Tobacco Use: Never Smoker     Smokeless Tobacco Use: Unknown   Alcohol Use: Not on file   Financial Resource Strain: Not on file   Food Insecurity: Not on file   Transportation Needs: Not on file   Physical Activity: Not on file   Stress: Not on file   Social Connections: Not on file   Intimate Partner Violence: Not on file   Depression:  Not on file   Housing Stability: Not on file       Functional Status:  Prior to admission patient needed assistance:              Mental Health Status:          Chemical Dependency Status:                Values/Beliefs:  Spiritual, Cultural Beliefs, Yarsanism Practices, Values that affect care: no               Additional Information:  Per care management/social work consult for discharge planning, patient was admitted on 4/24/2022 with pyogenic arthritis of right shoulder region. Tentative date of discharge is 4/29/2022. Reviewed chart and saw that PT is recommending tcu for patient. Talked to Viktoriya (daughter) over the phone to discuss discharging planning since patient was sleeping. Viktoriya said that patient lives in [place] with her son. She said that patient was independent before coming to the hospital. Patient uses walker when needed. Mentioned tcu recommendation and she said that her first choice is [facility], then [facility] and [facility]. Writer said she would send referrals. She said patient would need a w/c ride set up and is aware of the cost.     Addendum: Writer sent referrals via DOD to referrals above. [facility], and [facility] declined patient in DOD. Called [facility] tcu and they could accept patient for 4/29. She said she could accept patient at anytime on 4/29. Writer called [facility] and scheduled a w/c ride for 1100 on 4/29. Writer let Viktoriya (daughter) know of this ride time for 4/29. Writer called [facility] and let them know of the ride time for 4/29.    Completed PAS.    PAS-RR    D: Per DHS regulation, DIANN completed and submitted PAS-RR to [place] [employer] on [employer] Direct Connect via the [employer] LinkAge Line.  PAS-RR confirmation # is : [id number]    I: SW spoke with patient and they are aware a PAS-RR has been submitted.  DIANN reviewed with patient that they may be contacted for a follow up appointment within 10 days of hospital discharge if their SNF stay is < 30 days.   Contact information for Senior LinkAge Line was also provided.    A: Patient verbalized understanding.    P: Further questions may be directed to Senior LinkAge Line at #[phone], option #4 for [facility]- staff.      SOHAN Salinas

## 2022-04-27 NOTE — PROGRESS NOTES
[facility]    Received request for benefit check should Tamar discharge to home. Pt has 100% coverage for IV abx through [facility] secondary, may have a copay per dispense on the drug through pharmacy plan.     Thank you     Josephine Montes RN  [facility] Liaison  [phone] (Mon thru Fri 8am - 5pm)  [phone] Office

## 2022-04-27 NOTE — PROVIDER NOTIFICATION
MD Notification     Notified Person: MD     Notified Person Name: Luis Suarez MD     Notification Date/Time:04/27/22 0448     Notification Interaction: amcom    Purpose of Notification: blood cx from 4/26 am with Gram Positive C in clusters     Orders Received: Pt. On ANCEF, ID following     Comments:

## 2022-04-28 NOTE — PROGRESS NOTES
Care Management Follow Up    Length of Stay (days): 4    Expected Discharge Date: 04/29/2022     Concerns to be Addressed:       Patient plan of care discussed at interdisciplinary rounds: Yes    Anticipated Discharge Disposition: Transitional Care     Anticipated Discharge Services: None  Anticipated Discharge DME: None    Patient/family educated on Medicare website which has current facility and service quality ratings: no  Education Provided on the Discharge Plan:    Patient/Family in Agreement with the Plan: yes    Referrals Placed by CM/SW:    Private pay costs discussed: NA    Additional Information:  Call from patient's daughter Viktoriya regarding discharge plans for tomorrow. She wanted to confirm plan and asked about facility as she would like to go and visit. She also has questions about whether patient will discharge with IV for antibiotics or not but said she is waiting to hear from physician today.     SW to follow for discharge planning.      SOHAN Rascon

## 2022-04-28 NOTE — PLAN OF CARE
Patient alert, low appetite, assist of 2, voiding adequately, another positive blood culture, ID was informed of the positive blood culture. Oxycodone and Tylenol for pain management, soft stool x1. Dressing changed, ice on surgical site.

## 2022-04-28 NOTE — PROGRESS NOTES
[facility]    Medicine Progress Note - Hospitalist Service    Date of Admission:  4/24/2022    Assessment & Plan          Assessment & Plan    Tamar Rose is a 87 year old female admitted on 4/24/2022.  Past history of colon cancer, hyperlipidemia, osteoporosis, microscopic colitis, and iron deficiency anemia who presented to the ED with 3 days of nausea, diarrhea, and progressive right shoulder pain.  Arthrocentesis in ED concerning for septic joint.         Right septic arthritis of the shoulder due to MSSA with bacteremia s/p I&D 4/25  Pseudogout  Lactic acidosis  Per family, underwent steroid injection to shoulder 4/19/22 with subsequent increasing edema and pain refractory to PTA oxycodone.  * admission leukocytosis of 22 with lactate of 2.4;   * arthrocentesis 4/24 with turbid fluid with Staph aureus on culture; also noted calcium pyrophosphate crystals  * 2/2 admission blood cultures positive for MSSA     - post-op management per Orthopedic surgery  - continue Ancef per ID   - TTE pending  - tylenol 1000mg tid, prn oxycodone and prn IV dilaudid  - follow up synovial fluid sensitivities; anticipate MSSA  - intra-op cultures pending  - repeat blood cultures 4/25 and 4/26 ngtd; follow daily  --Repeat blood cultures 4/27 positive with staph, LOKESH recommended and ordered.       Possible pneumonia  Admission CXR demonstrating right basilar infiltrates, CT abd/pelvis with groundglass opacities of right base.  Treated for CAP with 7-day course of Ceftin starting 4/1/22, with resolution of productive cough.  Unclear if admission imaging reflects resolved PNA vs new MSSA PNA.   - currently on room air  - Ancef as above     Acute blood loss anemia  - admission hgb 12.5 >> 9.5 on 4/26; likely component dilution as well  - follow CBC     Urinary retention  Noted to be retaining urine with nearly 1000 ml out on straight cath.    * very difficult catheterization requiring 8 Fr cath  following admission  - unfortunately, carpenter was removed in PACU 4/25  - per Urology, follow PVR and straight cath with 8Fr if >500     Right upper pole kidney hypoattenuation-concern for infarction  No back or flank pain reported.  UA not congruent with infection.  Finding was noted on CT incidentally.  Renal US 4/24 wnl; lesion on CT not appreciated sonographically.     Nausea, vomiting, and diarrhea  Correlating with progressive worsening of right shoulder, possible gastritis on CT.  No bloody or black BM reported.  - mild nausea today, denies diarrhea     Hyponatremia, resolved  Admission Na 132, suspect hypovolemic.  Normalized with IVF.      Chronic conditions  Osteoporosis  FRANCESCO  Hyperlipidemia: continue PTA statin  History of colon cancer s/p right hemicolectomy 8/2017; in remission            Diet: Advance Diet as Tolerated: Regular Diet Adult    DVT Prophylaxis: Pneumatic Compression Devices  Carpenter Catheter: Not present  Central Lines: None  Cardiac Monitoring: None  Code Status: Full Code      Disposition Plan   Expected Discharge: 04/29/2022     Anticipated discharge location:  Awaiting care coordination huddle  Delays:          The patient's care was discussed with the Patient.    Kaylyn Chase MD  Hospitalist Service  [facility]  Securely message with the Vocera Web Console (learn more here)  Text page via [employer] Paging/Directory         Clinically Significant Risk Factors Present on Admission                 ______________________________________________________________________    Interval History   Patient notes she does not feel right today.  Complains of sore shoulder.  She notes her stomach upset is improved today.    Data reviewed today: I reviewed all medications, new labs and imaging results over the last 24 hours. I personally reviewed no images or EKG's today.    Physical Exam   Vital Signs: Temp: 97.7  F (36.5  C) Temp src: Oral BP: 109/53 Pulse: 74   Resp: 16 SpO2: 95 %  O2 Device: None (Room air)    Weight: 132 lbs .89 oz  General Appearance: Well appearing for stated age.  Respiratory: CTAB, no rales or ronchi  Cardiovascular: S1, S2 normal, no murmurs  GI: non-tender on palpation, BS present      Data   Recent Labs   Lab 04/28/22  0733 04/27/22  0722 04/27/22  0555 04/26/22  0831 04/25/22  0754 04/24/22  1323 04/24/22  1322   WBC  --  18.2*  --  17.2* 15.6*  --   --    HGB  --  10.0*  --  9.5* 10.6*  --   --    MCV  --  93  --  91 97  --   --    PLT  --  246  --  239 244  --   --    NA  --   --   --  136 136  --  132*   POTASSIUM  --   --   --  4.0 4.2  --  4.2   CHLORIDE  --   --   --  109 110*  --  107   CO2  --   --   --  21 21  --  20   BUN  --   --   --  15 14  --  18   CR 0.78  --   --  0.81 0.88   < > 0.82   ANIONGAP  --   --   --  6 5  --  5   MYRIAM  --   --   --  8.0* 7.7*  --  8.2*   GLC  --   --  107* 100* 110*  --  148*   ALBUMIN  --   --   --   --  1.9*  --  2.2*   PROTTOTAL  --   --   --   --  5.4*  --  6.2*   BILITOTAL  --   --   --   --  0.6  --  0.5   ALKPHOS  --   --   --   --  88  --  67   ALT  --   --   --   --  38  --  33   AST  --   --   --   --  37  --  26    < > = values in this interval not displayed.

## 2022-04-28 NOTE — PROGRESS NOTES
Orthopedic Surgery  Tamar Rose  2022  Admit Date:  2022  POD 3 Days Post-Op  S/P Procedure(s):  IRRIGATION AND DEBRIDEMENT, RIGHT NATIVE SHOULDER    Patient resting comfortably in bed.    Pain controlled.  Tolerating oral intake.    Denies nausea or vomiting  Denies chest pain or shortness of breath  No events overnight.     Vital Sign Ranges  Temperature Temp  Av.9  F (36.6  C)  Min: 97.7  F (36.5  C)  Max: 98  F (36.7  C)   Blood pressure Systolic (24hrs), Av , Min:109 , Max:117        Diastolic (24hrs), Av, Min:53, Max:59      Pulse Pulse  Av.5  Min: 74  Max: 85   Respirations Resp  Av  Min: 16  Max: 18   Pulse oximetry SpO2  Av %  Min: 95 %  Max: 95 %       Dressing is clean, dry, and intact.   Minimal erythema of the surrounding skin.   Bilateral calves are soft, non-tender.  Bilateral lower extremity is NVI.  Sensation intact bilateral lower extremities  5/5 motor with resisted dorsi and plantar flexion bilaterally  +Dp pulse    Labs:  Recent Labs   Lab Test 22  0831 22  0754 22  1322   POTASSIUM 4.0 4.2 4.2     Recent Labs   Lab Test 22  0722 22  0831 22  0754   HGB 10.0* 9.5* 10.6*     No results for input(s): INR in the last 36150 hours.  Recent Labs   Lab Test 22  0722 22  0831 22  0754    239 244       A/P  1. S/p Right shoulder infection with arthroscopic I&D   Continue amb and SCDs DVT prophylaxis.     Mobilize with PT/OT    WBAT   Sling for comfort.   Leave dressing intact   Iv abx per ID     Continue current pain regiment.    Armida Mcnulty PA-C

## 2022-04-28 NOTE — PROGRESS NOTES
"[facility]    Infectious Disease Progress Note    Date of Service : 04/28/2022     Assessment:  87YF who is admitted with acute onset right shoulder pain/septic arthritis following steroid injection with high grade S.aureus MSSA sepsis of skin source. She also had pneumonia about 2 weeks ago treated with oral antibiotics with ongoing  bilateral pulmonary opacities. Unclear whether bacteremia is secondary to staph pneumonia with seeding of shoulder or secondary to joint injection. She has a loud systolic murmur and echocardiogram is awaited to assess for endocarditis given concern for renal infarct imaging.     -High grade MSSA sepsis  -Right shoulder septic arthritis with hx of steroid injection prior to onset of symptoms. S/p arthroscopic debridement  -Recent pneumonia treated with oral antibiotics. CT shows bilateral ground glass opacities. Resolving pneumonia vs staph pneumonia as source for sepsis  -Systolic murmur  -Pseudogout with positive synovial fluid crystals for calcium pyrophosphate  -chronic medical conditions - Hx of colon cancer, osteoporosis, microscopic colitis, hyperlipidemia     Recommendations:  1. Follow repeat blood cxs  2. TTE with \" Calcific shadowing makes assessment for endocarditis extremely limited, blood cultures from 4/ 27 also positive for GPC , given multiple positive blood cultures get  LOKESH   3. PICC once blood cxs are negative for 48 hours , will need long term IV antibiotics at discharge, cultures from 4/ 27 positive as well   4. Treat with Cefazolin  Monitor for additional sites of seeding    Tanya Tovar MD    Interval History   Resting, feels ok, no pain at rest. Tolerating antibiotics without side effects    Antimicrobial therapy  4/25 Cefazolin    Physical Exam   Temp: 97.7  F (36.5  C) Temp src: Oral BP: 109/53 Pulse: 74   Resp: 16 SpO2: 95 % O2 Device: None (Room air)    Vitals:    04/24/22 1202 04/24/22 2100 04/28/22 0620   Weight: 59 kg (130 lb) " 59.1 kg (130 lb 6.4 oz) 59.9 kg (132 lb 0.9 oz)     Vital Signs with Ranges  Temp:  [97.7  F (36.5  C)-98  F (36.7  C)] 97.7  F (36.5  C)  Pulse:  [74-85] 74  Resp:  [16-18] 16  BP: (109-117)/(53-59) 109/53  SpO2:  [95 %] 95 %      GENERAL APPEARANCE:  awake  EYES: Eyes grossly normal to inspection  NECK: no adenopathy  RESP: lungs clear   CV: S1S2 , loud systolic murmur  ABDOMEN: soft, nontender  MS: right shoulder in surgical dressing  SKIN: no suspicious lesions or rashes, no peripheral stigmata of infective endocarditis.    Other:    Medications       acetaminophen  975 mg Oral Q8H     aspirin  81 mg Oral BID     ceFAZolin  2 g Intravenous Q8H     polyethylene glycol  17 g Oral Daily     rosuvastatin  20 mg Oral Daily     senna-docusate  1 tablet Oral BID    Or     senna-docusate  2 tablet Oral BID     sodium chloride (PF)  3 mL Intracatheter Q8H       Data   All microbiology laboratory data reviewed.  Recent Labs   Lab Test 04/27/22  0722 04/26/22  0831 04/25/22  0754   WBC 18.2* 17.2* 15.6*   HGB 10.0* 9.5* 10.6*   HCT 30.7* 28.3* 33.2*   MCV 93 91 97    239 244     Recent Labs   Lab Test 04/28/22  0733 04/26/22  0831 04/25/22  0754   CR 0.78 0.81 0.88     Microbiology  4/22 Blood cxs both sets 4/4 bottles positive  Line, venous; Blood          0 Result Notes    Culture Positive on the 1st day of incubation Abnormal        Staphylococcus aureus Panic     2 of 2 bottles         Line, venous; Blood         0 Result Notes     Component Ref Range & Units 2 d ago     Staphylococcus aureus Not Detected Detected Abnormal     Comment: Positive for Staphylococcus aureus and negative for the mecA gene (not resistant to methicillin) by [employer] multiplex nucleic acid test. Final identification and antimicrobial susceptibility testing will be verified by standard methods.    Staphylococcus epidermidis Not Detected Not Detected     Staphylococcus lugdunensis Not Detected Not Detected     Enterococcus faecalis Not  Detected Not Detected     Enterococcus faecium Not Detected Not Detected     Streptococcus species Not Detected Not Detected     Streptococcus agalactiae Not Detected Not Detected     Streptococcus anginosus group Not Detected Not Detected     Streptococcus pneumoniae Not Detected Not Detected     Streptococcus pyogenes Not Detected Not Detected     Listeria species Not Detected Not Detected         4/24 R shoulder synovial fluid  Shoulder, Right; Synovial fluid          0 Result Notes    Culture Culture in progress       4+ Staphylococcus aureus Abnormal         Imaging  4/24 CXR  EXAM: XR CHEST PORT 1 VIEW  LOCATION: [facility]  DATE/TIME: 4/24/2022 8:08 PM     INDICATION: Sepsis.  COMPARISON: None.                                                                      IMPRESSION: Moderate infiltrates within the right lower lung most consistent with pneumonia. Additional mild infiltrates in the left lung base may be related. Normal heart size and pulmonary vascularity.     4/24 Ct abdomen pelvis  EXAM: CT ABDOMEN PELVIS W CONTRAST  LOCATION: [facility]  DATE/TIME: 4/24/2022 2:14 PM     INDICATION: Abdominal pain, acute, nonlocalized  COMPARISON: None.  TECHNIQUE: CT scan of the abdomen and pelvis was performed following injection of IV contrast. Multiplanar reformats were obtained. Dose reduction techniques were used.  CONTRAST: 60mL Isovue 370     FINDINGS:   LOWER CHEST: Bibasilar primarily groundglass opacities, most pronounced in the right lower lobe. No pleural effusion. Small hiatal hernia.     HEPATOBILIARY: Cholelithiasis without evidence of acute cholecystitis. No biliary obstruction.     PANCREAS: Normal.     SPLEEN: Normal.     ADRENAL GLANDS: Normal.     KIDNEYS/BLADDER: Wedge-shaped area of hypoenhancement in the upper pole of the right kidney measuring up to 1.9 cm (coronal image 59). No hydronephrosis. Urinary bladder is distended but otherwise  unremarkable.     BOWEL: Prior right hemicolectomy without evidence of bowel obstruction. There is mucosal hyperenhancement and submucosal edema involving the gastric antrum (series 4 image 55).     LYMPH NODES: Normal.     VASCULATURE: Ectasia of the infrarenal abdominal aorta measuring up to 2.7 cm with scattered calcified atherosclerosis.     PELVIC ORGANS: Prior hysterectomy.     MUSCULOSKELETAL: Prior vertebroplasty of L2, L3 and L5 with age-indeterminate compression deformity of L1. 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                                                                      IMPRESSION:   1.  Wedge-shaped area of hypoattenuation the upper pole of the right kidney. Given morphology, favor evolving infarction but consider correlation with urinalysis.  2.  Possible gastritis involving the distal stomach.  3.  Bibasilar groundglass pulmonary opacities, most pronounced in the right lower lobe, can be seen with aspiration and/or pneumonia.  4.  Multiple prior vertebroplasties with additional age-indeterminate compression deformity of L1.     4/24 Renal US  EXAM: US RENAL COMPLETE  LOCATION: [facility]  DATE/TIME: 4/24/2022 7:40 PM     INDICATION: Abdominal pain. Possible infarct upper pole right kidney seen on CT.   COMPARISON: CT abdomen and pelvis 04/24/2022  TECHNIQUE: Routine Bilateral Renal and Bladder Ultrasound.     FINDINGS:     RIGHT KIDNEY: 8.9 x 5.1 x 3.3 cm. Normal without hydronephrosis or masses.      LEFT KIDNEY: 9.5 x 5.0 x 5.4 cm. Normal without hydronephrosis or masses.      BLADDER: Normal.                                                                      IMPRESSION:  1.  Normal kidney ultrasound. Hypodense lesion upper pole right kidney seen on CT not appreciated sonographically.

## 2022-04-29 NOTE — PRE-PROCEDURE
GENERAL PRE-PROCEDURE:   Procedure:  LOKESH  Date/Time:  4/29/2022 3:30 PM    Verbal consent obtained?: Yes    Written consent obtained?: Yes    Risks and benefits: Risks, benefits and alternatives were discussed    Consent given by:  Patient  Patient states understanding of procedure being performed: Yes    Patient's understanding of procedure matches consent: Yes    Procedure consent matches procedure scheduled: Yes    Expected level of sedation:  Moderate  Appropriately NPO:  Yes  ASA Class:  2  Mallampati  :  Grade 2- soft palate, base of uvula, tonsillar pillars, and portion of posterior pharyngeal wall visible  Lungs:  Lungs clear with good breath sounds bilaterally  Heart:  Normal heart sounds and rate  History & Physical reviewed:  History and physical reviewed and no updates needed  Statement of review:  I have reviewed the lab findings, diagnostic data, medications, and the plan for sedation

## 2022-04-29 NOTE — PROGRESS NOTES
Orthopedic Surgery  Tamar Rose  2022  Admit Date:  2022  POD 4 Days Post-Op  S/P Procedure(s):  IRRIGATION AND DEBRIDEMENT, RIGHT NATIVE SHOULDER    Patient resting comfortably in bed.    Pain controlled.  Tolerating oral intake.    Denies nausea or vomiting  Denies chest pain or shortness of breath  No events overnight.     Alert and orient to person and place  Vital Sign Ranges  Temperature Temp  Av.3  F (36.8  C)  Min: 97.9  F (36.6  C)  Max: 98.5  F (36.9  C)   Blood pressure Systolic (24hrs), Av , Min:107 , Max:131        Diastolic (24hrs), Av, Min:54, Max:69      Pulse Pulse  Av.3  Min: 83  Max: 109   Respirations Resp  Av  Min: 16  Max: 16   Pulse oximetry SpO2  Av.3 %  Min: 93 %  Max: 96 %       Dressing is clean, dry, and intact.   Sensation intact in upper arm over biceps as well as in hand r/m/u nerve distribution  Able to abduct and oppose right thumb  Able to flex and extend right wrist and elbow  +Radial pulse  +cap refill     Labs:  Recent Labs   Lab Test 22  0831 22  0754 22  1322   POTASSIUM 4.0 4.2 4.2     Recent Labs   Lab Test 22  0722 22  0831 22  0754   HGB 10.0* 9.5* 10.6*     No results for input(s): INR in the last 10851 hours.  Recent Labs   Lab Test 22  0722 22  0831 22  0754    239 244     A/P  1. S/p right native shoulder infection arthroscopic I&D              Continue amb and SCDs for DVT prophylaxis.                Mobilize with PT/OT               WBAT               Sling ordered for comfort              IV abx per ID.                Continue current pain regiment.    2. Disposition   Anticipate d/c to TCU based on medical clearance - Echo today.    Armida Mcnluty PA-C

## 2022-04-29 NOTE — PROGRESS NOTES
Orthopedic update     Patient continues to have + blood cultures with increasing CRP and WBC    Will take back for 2nd I&D of shoulder tomorrow AM  NPO after midnight     Armida Mcnulty PAC

## 2022-04-29 NOTE — PROGRESS NOTES
Care Management Follow Up    Length of Stay (days): 5    Expected Discharge Date: 04/29/2022     Concerns to be Addressed:       Patient plan of care discussed at interdisciplinary rounds: Yes    Anticipated Discharge Disposition: Transitional Care     Anticipated Discharge Services: None  Anticipated Discharge DME: None    Patient/family educated on Medicare website which has current facility and service quality ratings: no  Education Provided on the Discharge Plan:    Patient/Family in Agreement with the Plan: yes    Referrals Placed by CM/SW:    Private pay costs discussed: Not applicable    Additional Information:  Writer called to cancel ridrolando per notes indicating that she will stay for blood cultures. Writer called facility and left a message with this information. Call to daughter Viktoriya to update and she is in agreement and aware that patient is not discharging today.     SW will continue to follow for safe discharge.     SOHAN Rascon

## 2022-04-29 NOTE — PROGRESS NOTES
Care Suites Procedure Nursing Note    Patient Information  Name: Tamar Rose  Age: 87 year old    Procedure  Procedure: LOKESH  Procedure start time: 1523 first sedation medication given  Procedure complete time: 1550 MD left room  Concerns/abnormal assessment: difficult IV start as pt lost access upon arrival to procedure room, new PIV placed by IV team RN  If abnormal assessment, provider notified: Yes - procedure slightly delayed due to loss of PIV  Plan/Other: proceed as planned    1535 A/O. Pt denies difficulty swallowing or sleep apnea. No dentures or loose teeth. NPO x 6 hours (since 0900) except sips of water with meds. Plan for procedure and post procedure discussed with pt and all questions & concerns addressed.     LOKESH: Pt tolerated well. VSS. Total sedation given - 3 mg Versed & 75 mcg Fentanyl. See LOKESH Flowsheet.     1650 Detailed report called to Jon ARMAS on ortho unit. Pt to be NPO until 1700. Both pt & nurse informed.    1700 Pt transferred to 2305-01 per cart. Pt A/O. Resp even & unlabored upon transfer.     Yecenia Dial, RN        KATE SOLORZANO MD

## 2022-04-29 NOTE — PROGRESS NOTES
Date/Time: 04/28/2022 8556-4132  Summary: infection to right shoulder  Cognitive Concerns/Orientation: A&Ox4. Lethargic.   Behavior and Aggression Color: green  ABNL VS/O2: VSS on RA  Mobility: Ax2 GB/W. Very weak and tired  Pain Management: managed with oxy and tylenol     Diet: reg. Very low appetite  Bowel/Bladder: ambulating to BR. Complains of diarrhea   ABNL Labs/BG: Positive blood culture  Drain/Devices: PIV SL with intermittent ABX  Telemetry Rhythm: NA  Skin: right shoulder incision site. CDI. Sling on.   Tests/Procedures:  NA  Discharge Date: pending blood cultures.   Other Info: pt's IV is very positional. She is good at keeping her arm straight during ABX infusions tho.

## 2022-04-29 NOTE — PROGRESS NOTES
"Page to Dr. Tirado: \"inpt LOKESH lost IV access - attempting to restart now but will be difficult start - will let you know when ready for LOKESH\". Also call to echo team to update.      "

## 2022-04-29 NOTE — PROGRESS NOTES
[facility]    Infectious Disease Progress Note    Date of Service: 04/29/2022     Assessment:  87YF who is admitted with acute onset right shoulder pain/septic arthritis following steroid injection with high grade S.aureus MSSA sepsis of skin source. She also had pneumonia about 2 weeks ago treated with oral antibiotics with ongoing  bilateral pulmonary opacities. Unclear whether bacteremia is secondary to staph pneumonia with seeding of shoulder or secondary to joint injection. She has a loud systolic murmur though no obvious endocarditis on TTE. CT with renal infarct    With persistent bacteremia, leukocytosis and CRP elevation, feel shoulder infection is uncontrolled     -High grade MSSA sepsis persistent  -Right shoulder septic arthritis with hx of steroid injection prior to onset of symptoms. S/p arthroscopic debridement with uncontrolled infectuion  -Recent pneumonia treated with oral antibiotics. CT shows bilateral ground glass opacities. Resolving pneumonia vs staph pneumonia as source for sepsis  -Systolic murmur  -Pseudogout with positive synovial fluid crystals for calcium pyrophosphate  -chronic medical conditions - Hx of colon cancer, osteoporosis, microscopic colitis, hyperlipidemia     Recommendations  1.Feel shoulder infection is uncontrolled. Discussed with orthopedics and recommended second debridement. Consider open debridement as opposed to arthroscopic given aggressive infection  2. Repeat blood cxs X 2 sets until negative  3. LOKESH   4. Follow clinical status and labs  5. Continue Cefazolin    Discussed with orthopedics      Enedina Nathan MD, MD    Interval History   Feels poorly, uncontrolled shoulder pain, ongoing positive blood cxs a, CRP elevation and leukocytosis. Feels poorly overall, unable to move right shoulder    Physical Exam   Temp: 98.5  F (36.9  C) Temp src: Oral BP: 130/59 Pulse: 87   Resp: 16 SpO2: 93 % O2 Device: None (Room air)    Vitals:    04/24/22 2100  04/28/22 0620 04/29/22 0700   Weight: 59.1 kg (130 lb 6.4 oz) 59.9 kg (132 lb 0.9 oz) 61.9 kg (136 lb 7.4 oz)     Vital Signs with Ranges  Temp:  [97.9  F (36.6  C)-98.5  F (36.9  C)] 98.5  F (36.9  C)  Pulse:  [] 87  Resp:  [16] 16  BP: (107-131)/(54-69) 130/59  SpO2:  [93 %-96 %] 93 %    GENERAL APPEARANCE:  awake  EYES: Eyes grossly normal to inspection  NECK: no adenopathy  RESP: lungs clear   CV: S1S2 , loud systolic murmur  ABDOMEN: soft, nontender  MS: right shoulder pain, surgical site appears stable  SKIN: no suspicious lesions or rashes, no peripheral stigmata of infective endocarditis    Other:    Medications       aspirin  81 mg Oral BID     ceFAZolin  2 g Intravenous Q8H     polyethylene glycol  17 g Oral Daily     rosuvastatin  20 mg Oral Daily     senna-docusate  1 tablet Oral BID    Or     senna-docusate  2 tablet Oral BID     sodium chloride (PF)  3 mL Intracatheter Q8H       Data   All microbiology laboratory data reviewed.  Recent Labs   Lab Test 04/27/22  0722 04/26/22  0831 04/25/22  0754   WBC 18.2* 17.2* 15.6*   HGB 10.0* 9.5* 10.6*   HCT 30.7* 28.3* 33.2*   MCV 93 91 97    239 244     Recent Labs   Lab Test 04/28/22  0733 04/26/22  0831 04/25/22  0754   CR 0.78 0.81 0.88     Imaging  4/24 TTE  Interpretation Summary     The left ventricle is normal in size.  The visual ejection fraction is 65-70%.  Diastolic Doppler findings (E/E' ratio and/or other parameters) suggest left  ventricular filling pressures are indeterminate.  No regional wall motion abnormalities noted.  The right ventricle is normal in size and function.  Moderate to severe valvular aortic stenosis.  There is moderate mitral annular calcification.  There is mild (1+) mitral regurgitation.     Calcific shadowing makes assessment for endocarditis extremely limited. If  clinical concern, consider LOKSEH.    4/24 CT abdomen pelvis  EXAM: CT ABDOMEN PELVIS W CONTRAST  LOCATION: [facility]  DATE/TIME: 4/24/2022 2:14 PM     INDICATION: Abdominal pain, acute, nonlocalized  COMPARISON: None.  TECHNIQUE: CT scan of the abdomen and pelvis was performed following injection of IV contrast. Multiplanar reformats were obtained. Dose reduction techniques were used.  CONTRAST: 60mL Isovue 370     FINDINGS:   LOWER CHEST: Bibasilar primarily groundglass opacities, most pronounced in the right lower lobe. No pleural effusion. Small hiatal hernia.     HEPATOBILIARY: Cholelithiasis without evidence of acute cholecystitis. No biliary obstruction.     PANCREAS: Normal.     SPLEEN: Normal.     ADRENAL GLANDS: Normal.     KIDNEYS/BLADDER: Wedge-shaped area of hypoenhancement in the upper pole of the right kidney measuring up to 1.9 cm (coronal image 59). No hydronephrosis. Urinary bladder is distended but otherwise unremarkable.     BOWEL: Prior right hemicolectomy without evidence of bowel obstruction. There is mucosal hyperenhancement and submucosal edema involving the gastric antrum (series 4 image 55).     LYMPH NODES: Normal.     VASCULATURE: Ectasia of the infrarenal abdominal aorta measuring up to 2.7 cm with scattered calcified atherosclerosis.     PELVIC ORGANS: Prior hysterectomy.     MUSCULOSKELETAL: Prior vertebroplasty of L2, L3 and L5 with age-indeterminate compression deformity of L1. 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                                                                      IMPRESSION:   1.  Wedge-shaped area of hypoattenuation the upper pole of the right kidney. Given morphology, favor evolving infarction but consider correlation with urinalysis.  2.  Possible gastritis involving the distal stomach.  3.  Bibasilar groundglass pulmonary opacities, most pronounced in the right lower lobe, can be seen with aspiration and/or pneumonia.  4.  Multiple prior vertebroplasties with additional age-indeterminate compression deformity of L1.

## 2022-04-29 NOTE — PROGRESS NOTES
[facility]    Medicine Progress Note - Hospitalist Service    Date of Admission:  4/24/2022    Assessment & Plan          Assessment & Plan    Tamar Rose is a 87 year old female admitted on 4/24/2022.  Past history of colon cancer, hyperlipidemia, osteoporosis, microscopic colitis, and iron deficiency anemia who presented to the ED with 3 days of nausea, diarrhea, and progressive right shoulder pain.  Arthrocentesis in ED concerning for septic joint.         Right septic arthritis of the shoulder due to MSSA with bacteremia s/p I&D 4/25  Pseudogout  Lactic acidosis  Per family, underwent steroid injection to shoulder 4/19/22 with subsequent increasing edema and pain refractory to PTA oxycodone.  * admission leukocytosis of 22 with lactate of 2.4;   * arthrocentesis 4/24 with turbid fluid with Staph aureus on culture; also noted calcium pyrophosphate crystals  * 2/2 admission blood cultures positive for MSSA     - post-op management per Orthopedic surgery  - continue Ancef per ID   - TTE withouth vegetations  - tylenol 1000mg tid, prn oxycodone and prn IV dilaudid  - follow up synovial fluid sensitivities; anticipate MSSA  - intra-op cultures pending  - repeat blood cultures 4/25 and 4/26 ngtd; follow daily  --Repeat blood cultures 4/27 positive with staph, LOKESH recommended and ordered.  -- LOKESH today due to persistent bacteremia  -- Ortho to take patient back to OR tomorrow.        Possible pneumonia  Admission CXR demonstrating right basilar infiltrates, CT abd/pelvis with groundglass opacities of right base.  Treated for CAP with 7-day course of Ceftin starting 4/1/22, with resolution of productive cough.  Unclear if admission imaging reflects resolved PNA vs new MSSA PNA.   - currently on room air  - Ancef as above     Acute blood loss anemia  - admission hgb 12.5 >> 9.5 on 4/26; likely component dilution as well  - follow CBC     Urinary retention  Noted to be retaining  urine with nearly 1000 ml out on straight cath.    * very difficult catheterization requiring 8 Fr cath following admission  - unfortunately, carpenter was removed in PACU 4/25  - per Urology, follow PVR and straight cath with 8Fr if >500     Right upper pole kidney hypoattenuation-concern for infarction  No back or flank pain reported.  UA not congruent with infection.  Finding was noted on CT incidentally.  Renal US 4/24 wnl; lesion on CT not appreciated sonographically.     Nausea, vomiting, and diarrhea  Correlating with progressive worsening of right shoulder, possible gastritis on CT.  No bloody or black BM reported.  - mild nausea today, denies diarrhea     Hyponatremia, resolved  Admission Na 132, suspect hypovolemic.  Normalized with IVF.      Chronic conditions  Osteoporosis  FRANCESCO  Hyperlipidemia: continue PTA statin  History of colon cancer s/p right hemicolectomy 8/2017; in remission            Diet: NPO for Medical/Clinical Reasons Except for: Meds  NPO per Anesthesia Guidelines for Procedure/Surgery Except for: Meds    DVT Prophylaxis: Pneumatic Compression Devices  Carpenter Catheter: Not present  Central Lines: None  Cardiac Monitoring: None  Code Status: Full Code      Disposition Plan   Expected Discharge: 04/30/2022     Anticipated discharge location:  Awaiting care coordination huddle  Delays:          The patient's care was discussed with the Patient.    Kaylyn Chase MD  Hospitalist Service  [facility]  Securely message with the Vocera Web Console (learn more here)  Text page via [employer] Paging/Directory         Clinically Significant Risk Factors Present on Admission                   ______________________________________________________________________    Interval History   Patient notes she does not feel right today.  Complains of sore shoulder.  She notes her stomach upset is improved today.    Data reviewed today: I reviewed all medications, new labs and imaging results over  the last 24 hours. I personally reviewed no images or EKG's today.    Physical Exam   Vital Signs: Temp: 98.5  F (36.9  C) Temp src: Oral BP: 130/59 Pulse: 87   Resp: 16 SpO2: 97 % O2 Device: None (Room air)    Weight: 136 lbs 7.44 oz  General Appearance: Well appearing for stated age.  Respiratory: CTAB, no rales or ronchi  Cardiovascular: S1, S2 normal, no murmurs  GI: non-tender on palpation, BS present      Data   Recent Labs   Lab 04/28/22  0733 04/27/22  0722 04/27/22  0555 04/26/22  0831 04/25/22  0754 04/24/22  1323 04/24/22  1322   WBC  --  18.2*  --  17.2* 15.6*  --   --    HGB  --  10.0*  --  9.5* 10.6*  --   --    MCV  --  93  --  91 97  --   --    PLT  --  246  --  239 244  --   --    NA  --   --   --  136 136  --  132*   POTASSIUM  --   --   --  4.0 4.2  --  4.2   CHLORIDE  --   --   --  109 110*  --  107   CO2  --   --   --  21 21  --  20   BUN  --   --   --  15 14  --  18   CR 0.78  --   --  0.81 0.88   < > 0.82   ANIONGAP  --   --   --  6 5  --  5   MYRIAM  --   --   --  8.0* 7.7*  --  8.2*   GLC  --   --  107* 100* 110*  --  148*   ALBUMIN  --   --   --   --  1.9*  --  2.2*   PROTTOTAL  --   --   --   --  5.4*  --  6.2*   BILITOTAL  --   --   --   --  0.6  --  0.5   ALKPHOS  --   --   --   --  88  --  67   ALT  --   --   --   --  38  --  33   AST  --   --   --   --  37  --  26    < > = values in this interval not displayed.

## 2022-04-29 NOTE — PROGRESS NOTES
Date/Time:04/29,[id number]    Trauma/Ortho/Medical (Choose one) :Ortho    Diagnosis:I&D Right Native Shoulder  POD#:4  Mental Status:A&O X 4  Activity/dangle: A-1-2 gait belt to bedside commode.  Diet:Regular  Pain:Managed with scheduled tylenol and Ice   Coley/Voiding:Beside commode  Tele/Restraints/Iso:None  02/LDA:RA  D/C Date:Pending blood cultures  Other Info:Pt had x 2 soft stools  reports abdominal discomfort,nursing to continue to monitor for C-diff.

## 2022-04-29 NOTE — PLAN OF CARE
Goal Outcome Evaluation:      Pt is A/OX4. Pt refused to take pain med for pain; said it would hurt her stomach. Currently on NPO, will try again after diet is upgraded after echo. Sling to R shoulder in place. Ice applied. Repo frequently for comfort.Staff called daughter and updated her regarding pt's progress.Will continue to monitor.

## 2022-04-29 NOTE — PLAN OF CARE
Goal Outcome Evaluation:  Pt is A & O x 4. Lungs sound clear, bowel sounds active, cms intact, generalized bruising. Up with 2. Dressing is CDI. Had LOKESH today, Hospitalist was notified of result. Will be NPO after midnight for possible I&D of right shoulder in am. Received tylenol for pain. SL. Will continue to monitor.

## 2022-04-30 NOTE — OP NOTE
Procedure Date: 04/30/2022    PREOPERATIVE DIAGNOSES:    1.  Right septic shoulder.  2.  Right shoulder degenerative joint disease.    POSTOPERATIVE DIAGNOSES:    1.  Right septic shoulder.  2.  Right shoulder degenerative joint disease.    PROCEDURE PERFORMED:     1.  Right shoulder arthroscopic incision and drainage.  2.  Aggressive debridement of the right glenohumeral joint.  3.  Right shoulder labral debridement.    SURGEON:  Wero Murphy MD    ASSISTANT:  Chirag Buck PA-C, whose assistance was critical for positioning the patient, prepping, draping, soft tissue retraction, camera management, arm positioning, closing and patient transfer.    ANESTHETIC:  General.    ESTIMATED BLOOD LOSS:  20 mL    FINDINGS:  Fibrous tissue and small purulence within the joint.    INDICATIONS FOR PROCEDURE:  An 87-year-old female who had had increasing shoulder pain over the last 3 weeks.  She had a cortisone injection in clinic.  It was eventually aspirated in the Emergency Room and found to have purulence and gram-positive cocci.  On 04/25/2022, she was taken with Dr. Cline for arthroscopic debridement of the shoulder.  She initially did somewhat better, but now has increasing pain and elevated inflammatory markers.  Risks, benefits, alternatives explained.  She elected to proceed with the above procedure.    DESCRIPTION OF PROCEDURE:  The patient was identified in preoperative holding area per hospital policy, correct operative site marked, to the OR, onto OR table.  General anesthesia induced, placed in a beach chair position.  All bony prominences were well padded.  Prep and drape, timeout performed, all in the room agreed.  She had been on IV antibiotics.  Established the previous posterior and anterior portals, introducing the camera, and there was some small purulent and fibrinous tissue within the joint.  Brought in the arthroscopic shaver from the anterior portal, visualized, and then performed a right  shoulder arthroscopic irrigation and debridement.  With the shaver, debrided fibrous tissue,  glenohumeral joint and a small portion of labrum.  Flushed a total of 6 L through the joint.  She did have arthritic change, both on the glenoid and the humerus.  Now the fluid was finished running.  Evacuated all fluid out of the joint and closed with nylon suture.  Sterile dressings applied.  Awakened, transferred stable to PACU.    POSTOPERATIVE PLAN:    1.  Continue intravenous antibiotics.  2.  Sling for comfort, weightbearing as tolerated.  3.  Follow up with Dr. Cline roughly 2 weeks postop.  He did her first shoulder surgery, and shoulder is his subspecialty expertise.    Wero Murphy MD        D: 2022   T: 2022   MT: MANFRED    Name:     RAGHAVENDRA MILLAN  MRN:      [id number]        Account:        [id number]   :      1935           Procedure Date: 2022     Document: [id number]

## 2022-04-30 NOTE — ANESTHESIA PROCEDURE NOTES
Arterial Line Procedure Note    Pre-Procedure   Staff -        Anesthesiologist:  Torsten Ridley MD       Performed By: anesthesiologist       Location: pre-op       Pre-Anesthestic Checklist: patient identified, IV checked, risks and benefits discussed, informed consent, monitors and equipment checked, pre-op evaluation and at physician/surgeon's request  Timeout:       Correct Patient: Yes        Correct Procedure: Yes        Correct Site: Yes        Correct Position: Yes   Line Placement:   This line was placed Pre Induction starting at 4/30/2022 1:34 PM and ending at 4/30/2022 1:44 PM  Procedure   Procedure: arterial line and new line       Insertion Site: brachial.  Sterile Prep        Standard elements of sterile barrier followed       Skin prep: Chloraprep  Insertion/Injection        Technique: Seldinger Technique and ultrasound guided        1. Ultrasound was used to evaluate the access site.       2. Artery evaluated via ultrasound for patency/adequacy.       3. Using real-time ultrasound the needle/catheter was observed entering the artery/vein.       4. Permanent image was captured and entered into the patient's record.       5. The visualized structures were anatomically normal.       6. There were no apparent abnormal pathologic findings.       Catheter Type/Size: 20 gauge, 1.75 in/4.5 cm quick cath (integral wire)  Narrative         Secured by: transparent dressing       Tegaderm dressing used.       Arterial waveform: Yes        IBP within 10% of NIBP: Yes   Comments:  Attepted L radial with hematoma. Pressure held. Second attempt L brachial.

## 2022-04-30 NOTE — PROGRESS NOTES
Pt A/Ox4, VSS on RA, bladder scanned for >900 so writer straight cathed. No c/o pain, dressing CDI, NPO at midnight for I&D of shoulder tomorrow.

## 2022-04-30 NOTE — PROGRESS NOTES
[facility]    Infectious Disease Progress Note    Date of Service : 04/30/2022     Assessment:  87YF who is admitted with acute onset right shoulder pain/septic arthritis following steroid injection with high grade S.aureus MSSA sepsis with endocarditis of the aortic and possibly mitral valves . She  had pneumonia about 2 weeks ago treated with oral antibiotics with ongoing  bilateral pulmonary opacities. Unclear whether bacteremia is secondary to staph pneumonia with seeding of shoulder or secondary to joint injection.      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 S/p arthroscopic debridement with uncontrolled infection  -Ongoing leukocytosis and CRP elevation  -Right renal infarct  -Recent pneumonia treated with oral antibiotics. CT shows bilateral ground glass opacities. Resolving pneumonia vs staph pneumonia as source for sepsis  -Systolic murmur  -Pseudogout with positive synovial fluid crystals for calcium pyrophosphate  -chronic medical conditions - Hx of colon cancer, osteoporosis, microscopic colitis, hyperlipidemia     Recommendations  1. Follow blood cxs  2. Right shoulder debridement planned for today  3. Cardiac surgery consultation has been requested  4. Continue Cefazolin  5. Follow clinical status and labs- WBC/CRP  6. No there focal complaints to suggest additional sites of seeding at this time.  7. Short interval repeat echocardiogram    ID will continue to follow  Enedina Nathan MD, MD    Interval History   Continues to feel poorly, worsening WBC/leukocytosis. Ongoing pain in right shoulder. LOKESH noted. Has chronic back pain , denies acute worsening of back pain, denies pain in any other joint    Physical Exam   Temp: 98.3  F (36.8  C) Temp src: Oral BP:  132/72 Pulse: 103   Resp: 16 SpO2: 94 % O2 Device: None (Room air) Oxygen Delivery: 2 LPM  Vitals:    04/24/22 2100 04/28/22 0620 04/29/22 0700   Weight: 59.1 kg (130 lb 6.4 oz) 59.9 kg (132 lb 0.9 oz) 61.9 kg (136 lb 7.4 oz)     Vital Signs with Ranges  Temp:  [98.1  F (36.7  C)-98.5  F (36.9  C)] 98.3  F (36.8  C)  Pulse:  [] 103  Resp:  [8-26] 16  BP: (121-147)/(53-72) 132/72  SpO2:  [94 %-99 %] 94 %    Constitutional: Awake, alert, cooperative,feels poorly  Lungs: Clear to auscultation bilaterally  Cardiovascular: S1S2 loud systolic murmur  Abdomen: soft with some suprapubic tenderness  Skin: thin skin, ecchymosis  MS : Right shoulder pain    Other:    Medications       [Held by provider] aspirin  81 mg Oral BID     ceFAZolin  2 g Intravenous Q8H     polyethylene glycol  17 g Oral Daily     rosuvastatin  20 mg Oral Daily     senna-docusate  1 tablet Oral BID    Or     senna-docusate  2 tablet Oral BID     sodium chloride (PF)  3 mL Intracatheter Q8H       Data   All microbiology laboratory data reviewed.  Recent Labs   Lab Test 04/29/22  1839 04/27/22  0722 04/26/22  0831   WBC 19.3* 18.2* 17.2*   HGB 10.8* 10.0* 9.5*   HCT 33.2* 30.7* 28.3*   MCV 94 93 91    246 239     Recent Labs   Lab Test 04/28/22  0733 04/26/22  0831 04/25/22  0754   CR 0.78 0.81 0.88     Microbiology  4/24 and 4/25 R shoulder   Shoulder, Right; Synovial fluid          0 Result Notes    Culture 4+ Staphylococcus aureus Abnormal             Resulting Agency: [facility]       Susceptibility     Staphylococcus aureus     LOUIS     Clindamycin  Resistant 1     Erythromycin 4.0 ug/mL Intermediate     Gentamicin <=0.5 ug/mL Susceptible     Oxacillin 0.5 ug/mL Susceptible 2     Tetracycline <=1.0 ug/mL Susceptible     Trimethoprim/Sulfamethoxazole <=0.5/9.5 u... Susceptible     Vancomycin 1.0 ug/mL Susceptible                 4/29 Blood cx pending, 4/28 blood cx pending, 4/27 Positive, 4/25 +, 4/24 +   Line, venous; Blood         0 Result  Notes    Culture Positive on the 1st day of incubation Abnormal        Staphylococcus aureus Panic     2 of 2 bottles        Resulting Agency: [facility]       Susceptibility     Staphylococcus aureus     LOUIS     Clindamycin  Resistant 1     Erythromycin 1.0 ug/mL Intermediate     Gentamicin <=0.5 ug/mL Susceptible     Oxacillin 0.5 ug/mL Susceptible 2     Tetracycline <=1.0 ug/mL Susceptible     Trimethoprim/Sulfamethoxazole <=0.5/9.5 u... Susceptible     Vancomycin 1.0 ug/mL Susceptible                 Imaging  4/29 LOKESH  Interpretation Summary     The left ventricle is normal in size.  The visual ejection fraction is 60-65%.  No regional wall motion abnormalities noted.  There is a large vegetation or mass on the aortic valve.  Mobile 1cm mass associated with the left coronary leaflet of the aortic valve  HIGHLY SUSPICIOUS for infective endocarditis with associated aortic  regurgitation.  There is moderately severe (3+) aortic regurgitation.  There is a small vegetation or mass on the mitral valve.  0.5 cm posterior leaflet homogenous density  There is mild to moderate (1-2+) mitral regurgitation.     Compared to the TTE from 4/26/2022 the aortic regurgitation is new and along  with the visible mass on the aortic and to a lesser extent the mitral valve is  HIGHLY SUGGESTIVE OF INFECTIVE ENDOCARDITIS with damage to the function of the  aortic valve.    4/24 CT abdomen pelvis  EXAM: CT ABDOMEN PELVIS W CONTRAST  LOCATION: [facility]  DATE/TIME: 4/24/2022 2:14 PM     INDICATION: Abdominal pain, acute, nonlocalized  COMPARISON: None.  TECHNIQUE: CT scan of the abdomen and pelvis was performed following injection of IV contrast. Multiplanar reformats were obtained. Dose reduction techniques were used.  CONTRAST: 60mL Isovue 370     FINDINGS:   LOWER CHEST: Bibasilar primarily groundglass opacities, most pronounced in the right lower lobe. No pleural effusion. Small hiatal hernia.     HEPATOBILIARY:  Cholelithiasis without evidence of acute cholecystitis. No biliary obstruction.     PANCREAS: Normal.     SPLEEN: Normal.     ADRENAL GLANDS: Normal.     KIDNEYS/BLADDER: Wedge-shaped area of hypoenhancement in the upper pole of the right kidney measuring up to 1.9 cm (coronal image 59). No hydronephrosis. Urinary bladder is distended but otherwise unremarkable.     BOWEL: Prior right hemicolectomy without evidence of bowel obstruction. There is mucosal hyperenhancement and submucosal edema involving the gastric antrum (series 4 image 55).     LYMPH NODES: Normal.     VASCULATURE: Ectasia of the infrarenal abdominal aorta measuring up to 2.7 cm with scattered calcified atherosclerosis.     PELVIC ORGANS: Prior hysterectomy.     MUSCULOSKELETAL: Prior vertebroplasty of L2, L3 and L5 with age-indeterminate compression deformity of L1. 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                                                                      IMPRESSION:   1.  Wedge-shaped area of hypoattenuation the upper pole of the right kidney. Given morphology, favor evolving infarction but consider correlation with urinalysis.  2.  Possible gastritis involving the distal stomach.  3.  Bibasilar groundglass pulmonary opacities, most pronounced in the right lower lobe, can be seen with aspiration and/or pneumonia.  4.  Multiple prior vertebroplasties with additional age-indeterminate compression deformity of L1.

## 2022-04-30 NOTE — ANESTHESIA POSTPROCEDURE EVALUATION
Patient: Tamar Rose    Procedure: Procedure(s):  IRRIGATION AND DEBRIDEMENT, SHOULDER Arthroscopic  RIGHT       Anesthesia Type:  General    Note:  Disposition: Inpatient   Postop Pain Control: Uneventful            Sign Out: Well controlled pain   PONV: No   Neuro/Psych: Uneventful            Sign Out: Acceptable/Baseline neuro status   Airway/Respiratory: Uneventful            Sign Out: Acceptable/Baseline resp. status   CV/Hemodynamics:    Other NRE: NONE   DID A NON-ROUTINE EVENT OCCUR? No    Event details/Postop Comments:  Around 4pm the patient developed some chest pain followed by Afib with RVR.  She was given esmolol boluses that she did not respond to, so diltiazem IV pushes of 20mg total given and started on a drip.  With her rate controlled she said she felt much better and denied pain.  EKG with rate controlled showed no ST changes. She said she felt much better.  Medicine was paged.  Some time passed and she again developed a fast heart rate and pain (unsure which happened first).  BP MAP ~65 and rate ~120s.  She was given fentanyl to help with pain, and medicine returned the page.  Decision to give oral ASA 325mg (discussed with surgery as well), IV metoprolol, and additional fentanyl.  Will hold off on heparin given her recent surgery.  The LILLIAN working with medicine arrived to PACU and assumed care of the patient after receiving a report from myself.             Last vitals:  Vitals Value Taken Time   /49 04/30/22 1707   Temp 36.8  C (98.3  F) 04/30/22 1610   Pulse 86 04/30/22 1720   Resp 16 04/30/22 1720   SpO2 95 % 04/30/22 1720   Vitals shown include unvalidated device data.    Electronically Signed By: Micheal Stone MD  April 30, 2022  5:21 PM

## 2022-04-30 NOTE — ANESTHESIA CARE TRANSFER NOTE
Patient: Tamar Rose    Procedure: Procedure(s):  IRRIGATION AND DEBRIDEMENT, SHOULDER Arthroscopic  RIGHT       Diagnosis: Joint infection (H) [M00.9]  Diagnosis Additional Information: No value filed.    Anesthesia Type:   General     Note:    Oropharynx: oropharynx clear of all foreign objects  Level of Consciousness: awake  Oxygen Supplementation: nasal cannula    Independent Airway: airway patency satisfactory and stable  Dentition: dentition unchanged  Vital Signs Stable: post-procedure vital signs reviewed and stable  Report to RN Given: handoff report given  Patient transferred to: PACU          Vitals:  Vitals Value Taken Time   /45 04/30/22 1518   Temp     Pulse 109 04/30/22 1520   Resp 21 04/30/22 1520   SpO2 97 % 04/30/22 1520   Vitals shown include unvalidated device data.    Electronically Signed By: MORALES Alvares CRNA  April 30, 2022  3:21 PM

## 2022-04-30 NOTE — CONSULTS
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 Blood cultures on admission growing MSSA, shoulder arthrocentesis 04/24 growing MSSA. To OR 04/25 with ortho for I&D. Initial TTE on 04/24 without obvious vegetation or AI, but due to persistently positive blood cultures underwent LOKESH 04/29 which demonstrated large mobile 1 cm mass on left coronary cusp of aortic valve with associated 3+ AI, and small ~0.5 cm mass on posterior leaflet of mitral valve with 1-2+ mitral regurgitation.  CV surgery consulted for consideration of AVR/MVR.     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 Surgical AVR/MVR indicated, although high risk morbidity/mortality given age, frailty, and large surgery. Surgical planning pending discussion with patient/family and evaluation as outlined below.    - High risk for hemodynamic decompensation with progressive AI. Recommend cardiology consultation, daily EKG, and surveillance for heart block which may indicate abscess formation  - Given left sided vegetations, will need MRI brain to r/o cerebral septic cerebral emboli. If positive will request neuro crit eval +/- cerebral angiogram to r/o myotic aneurysm  - Will need ischemic evaluation. Coronary angiogram if cardiology willing to perform given large AV vegetation, otherwise CT coronary angiogram  - Will need dental CT/clearance  - Will need carotid US  - Will need CT chest non-con  - Ideally blood cultures cleared (last positive blood culture 04/27, NGTD 04/28-04/30) and shoulder infection controlled prior to  valve surgery  - Other cares per primary team  - Thank you for the opportunity to participate in the care of this patient. CV surgery will follow.    STS Risk Calculator for Isolated AVR  Risk of Mortality: 5.151%  Renal Failure: 1.927%  Permanent Stroke: 3.099%  Prolonged Ventilation: 17.887%  DSW Infection: 0.016%  Reoperation: 5.268%  Morbidity or Mortality: 25.613%  Short Length of Stay: 11.330%  Long Length of Stay: 19.642%      Patient and plan discussed with attending, Dr. Del Angel.      Bing Lo PA-C  Cardiothoracic Surgery  Pager [phone]        ________________________________________________________________________________________________    HPI:   Tamar Rose is a 87 year old female with a history of colon cancer s/p colectomy 2017, dyslipidemia, osteoporosis, colitis, and iron deficiency anemia who was admitted to [facility] 04/24 with several day history of nausea, diarrhea, and worsening right shoulder pain. Blood cultures on admission growing MSSA, shoulder arthrocentesis 04/24 growing MSSA. To OR 04/25 with ortho for I&D. Initial TTE on 04/24 without obvious vegetation or AI, but due to persistently positive blood cultures underwent LOKESH 04/29 which demonstrated large mobile 1 cm mass on left coronary cusp of aortic valve with associated 3+ AI, and small ~0.5 cm mass on posterior leaflet of mitral valve with 1-2+ mitral regurgitation.  CV surgery consulted for consideration of AVR/MVR.     Patient currently reports fatigue. Denies SOB or palpitations. No orthopnea or PND.     PMH:  Past Medical History:   Diagnosis Date     NO ACTIVE PROBLEMS        PSH:  Past Surgical History:   Procedure Laterality Date     ARTHROSCOPY SHOULDER, OPEN DEBRIDEMENT, COMBINED Right 4/25/2022    Procedure: IRRIGATION AND DEBRIDEMENT, RIGHT NATIVE SHOULDER;  Surgeon: Baljinder Cline MD;  Location:  OR     HYSTERECTOMY, MATT         FH:  Family History   Problem Relation Age of Onset     Cancer Maternal Grandmother         SH:  Social History     Socioeconomic History     Marital status: Single   Tobacco Use     Smoking status: Never Smoker   Substance and Sexual Activity     Alcohol use: Yes     Comment: occ       Home Meds:  Medications Prior to Admission   Medication Sig Dispense Refill Last Dose     ibuprofen (ADVIL/MOTRIN) 200 MG tablet Take 200 mg by mouth every 4 hours as needed for mild pain    at prn     methylPREDNISolone (MEDROL DOSEPAK) 4 MG tablet therapy pack Take by mouth See Admin Instructions Follow Package Directions   4/24/2022 at AM     rosuvastatin (CRESTOR) 20 MG tablet Take 20 mg by mouth daily   4/23/2022 at AM     [DISCONTINUED] oxyCODONE-acetaminophen (PERCOCET) 5-325 MG tablet Take 0.5-1 tablets by mouth every 6 hours as needed for pain 6 tablet 0 4/24/2022 at 0200     Allergies:  Allergies   Allergen Reactions     Sulfa Drugs Rash     ROS: 10 point ROS neg other than the symptoms noted above in the HPI.    Physical Exam:  Temp:  [98.1  F (36.7  C)-98.5  F (36.9  C)] 98.3  F (36.8  C)  Pulse:  [] 103  Resp:  [8-26] 16  BP: (121-147)/(53-72) 132/72  SpO2:  [94 %-99 %] 94 %      Gen: NAD, resting in bed, frail  CV: RRR, S1S2 normal, 2/6 systolic murmur, rubs  Pulm: clear to auscultation bilaterally, no rhonchi or wheezes  Abd: soft, non-tender, no guarding, +BM  Ext: no lower extremity edema  Skin: multiple bruises  Neuro: grossly normal  Psych: calm, cooperative     Labs:  ABG   Recent Labs   Lab 04/24/22  1339   PH 7.28*     CBC  Recent Labs   Lab 04/29/22  1839 04/27/22  0722 04/26/22  0831 04/25/22  0754   WBC 19.3* 18.2* 17.2* 15.6*   HGB 10.8* 10.0* 9.5* 10.6*    246 239 244     BMP  Recent Labs   Lab 04/28/22  0733 04/27/22  0555 04/26/22  0831 04/25/22  0754 04/24/22  1323 04/24/22  1322   NA  --   --  136 136  --  132*   POTASSIUM  --   --  4.0 4.2  --  4.2   CHLORIDE  --   --  109 110*  --  107   CO2  --   --  21 21  --  20   BUN  --   --  15 14  --  18   CR 0.78  --  0.81 0.88 0.8  0.82   GLC  --  107* 100* 110*  --  148*     LFT  Recent Labs   Lab 22  0754 22  1322   AST 37 26   ALT 38 33   ALKPHOS 88 67   BILITOTAL 0.6 0.5   ALBUMIN 1.9* 2.2*     PancreasNo lab results found in last 7 days.    Imaging:  Recent Results (from the past 24 hour(s))   Echocardiogram LOKESH   Result Value    LVEF  60-65%    [facility]    [id number]  [id number]  [id number]^CAITLIN^CESARIO     [facility]  Echocardiography Laboratory  [address] [place] [address]     Name: RAGHAVENDRA MILLAN  MRN: [id number]  : 1935  Study Date: 2022 03:09 PM  Age: 87 yrs  Gender: Female  Patient Location: [facility]  Reason For Study: Endocarditis  Ordering Physician: CESARIO TUCKER  Referring Physician: Armida Betancourt  Performed By: Miguel Angel Zhang     BSA: 1.7 m2  Height: 67 in  Weight: 132 lb  HR: 101  BP: 109/53 mmHg  ______________________________________________________________________________  Procedure  Complete LOKESH Adult. LOKESH Probe serial #[id number] was used during the procedure.  The heart rate, respiratory rate and response to care were monitored  throughout the procedure with the assistance of the nurse.  ______________________________________________________________________________  Interpretation Summary     The left ventricle is normal in size.  The visual ejection fraction is 60-65%.  No regional wall motion abnormalities noted.  There is a large vegetation or mass on the aortic valve.  Mobile 1cm mass associated with the left coronary leaflet of the aortic valve  HIGHLY SUSPICIOUS for infective endocarditis with associated aortic  regurgitation.  There is moderately severe (3+) aortic regurgitation.  There is a small vegetation or mass on the mitral valve.  0.5 cm posterior leaflet homogenous density  There is mild to moderate (1-2+) mitral regurgitation.     Compared to the TTE from 2022 the aortic regurgitation is new and along  with the visible mass on the  aortic and to a lesser extent the mitral valve is  HIGHLY SUGGESTIVE OF INFECTIVE ENDOCARDITIS with damage to the function of the  aortic valve.     Dr. Kaylyn Martinez messaged at 4:05pm of results with response  confirmed.  ______________________________________________________________________________  LOKESH  I determined this patient to be an appropriate candidate for the planned  sedation and procedure and have reassessed the patient immediately prior to  sedation and procedure. Total sedation time: 24 minutes of continuous bedside  1:1 monitoring. Versed (3mg) was given intravenously. Fentanyl (75mcg) was  given intravenously. The transesophageal probe was passed without difficulty.     Left Ventricle  The left ventricle is normal in size. The visual ejection fraction is 60-65%.  No regional wall motion abnormalities noted.     Right Ventricle  The right ventricle is normal in size and function.     Atria  The left atrium is moderately dilated. The right atrium is moderately dilated.  A contrast injection (Bubble Study) was performed that was negative for flow  across the interatrial septum. No thrombus is detected in the left atrial  appendage.     Mitral Valve  There is mild mitral annular calcification. The mitral valve leaflets appear  thickened, but open well. There is a small vegetation or mass on the mitral  valve. 0.5 cm posterior leaflet homogenous density. There is mild to moderate  (1-2+) mitral regurgitation.     Tricuspid Valve  The tricuspid valve is normal in structure and function. There is trace  tricuspid regurgitation.     Aortic Valve  There is a large vegetation or mass on the aortic valve. Mobile 1cm mass  associated with the left coronary leaflet of the aortic valve HIGHLY  SUSPICIOUS for infective endocarditis with associated aortic regurgitation.  There is moderately severe (3+) aortic regurgitation.     Pulmonic Valve  The pulmonic valve is normal in structure and function.      Vessels  The aortic root is normal size.     Pericardial/Pleural  Trivial pericardial effusion.     Rhythm  The rhythm was sinus tachycardia.  ______________________________________________________________________________  Report approved by: Justin Tijerina 04/29/2022 04:21 PM     ______________________________________________________________________________

## 2022-04-30 NOTE — PROGRESS NOTES
[facility]    Medicine Progress Note - Hospitalist Service    Date of Admission:  4/24/2022    Assessment & Plan          Assessment & Plan    Tamar Rose is a 87 year old female admitted on 4/24/2022.  Past history of colon cancer, hyperlipidemia, osteoporosis, microscopic colitis, and iron deficiency anemia who presented to the ED with 3 days of nausea, diarrhea, and progressive right shoulder pain.  Arthrocentesis in ED concerning for septic joint.         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  * admission leukocytosis of 22 with lactate of 2.4;   * arthrocentesis 4/24 with turbid fluid with Staph aureus on culture; also noted calcium pyrophosphate crystals  * admission blood cultures positive for MSSA  * persistent bacteremia; repeat cultures daily  * TTE withouth vegetations  * Repeat I&D 4/30/22  * LOKESH with aortic and mitral vegetations seen  * CT surgery consulted: input appreciated. Plan for care conference 05/01to discuss urgent valve replacement  * ID following    Plan:  -- cont post op management per ortho  -- continue abx - ancef per ID recs  -- continue to follow daily blood culture results   -- follow up intra-op cultures.  -- cont pain control        Possible pneumonia  Admission CXR demonstrating right basilar infiltrates, CT abd/pelvis with groundglass opacities of right base.  Treated for CAP with 7-day course of Ceftin starting 4/1/22, with resolution of productive cough.  Unclear if admission imaging reflects resolved PNA vs new MSSA PNA.   - currently on room air  - Ancef as above     Acute blood loss anemia  - admission hgb 12.5 >> 9.5 on 4/26; likely component dilution as well  - follow CBC     Urinary retention  Noted to be retaining urine  with nearly 1000 ml out on straight cath.    * very difficult catheterization requiring 8 Fr cath following admission  - unfortunately, carpenter was removed in PACU 4/25  - per Urology, follow PVR and straight cath with 8Fr if >500     Right upper pole kidney hypoattenuation-concern for infarction  No back or flank pain reported.  UA not congruent with infection.  Finding was noted on CT incidentally.  Renal US 4/24 wnl; lesion on CT not appreciated sonographically.     Nausea, vomiting, and diarrhea  Correlating with progressive worsening of right shoulder, possible gastritis on CT.  No bloody or black BM reported.  - mild nausea today, denies diarrhea     Hyponatremia, resolved  Admission Na 132, suspect hypovolemic.  Normalized with IVF.      Chronic conditions  Osteoporosis  FRANCESCO  Hyperlipidemia: continue PTA statin  History of colon cancer s/p right hemicolectomy 8/2017; in remission            Diet: NPO per Anesthesia Guidelines for Procedure/Surgery Except for: Meds    DVT Prophylaxis: Pneumatic Compression Devices  Carpenter Catheter: Not present  Central Lines: None  Cardiac Monitoring: None  Code Status: Full Code      Disposition Plan   Expected Discharge: 05/01/2022     Anticipated discharge location:  Awaiting care coordination huddle  Delays:          The patient's care was discussed with the Patient.    Kaylyn Chase MD  Hospitalist Service  [facility]  Securely message with the [employer] Web Console (learn more here)  Text page via [employer] Paging/Directory         Clinically Significant Risk Factors Present on Admission                   ______________________________________________________________________    Interval History   Patient pending repeat I&D washout today per ortho    Data reviewed today: I reviewed all medications, new labs and imaging results over the last 24 hours. I personally reviewed no images or EKG's today.    Physical Exam   Vital Signs: Temp: 98.3  F (36.8  C) Temp  src: Oral BP: 132/72 Pulse: 103   Resp: 16 SpO2: 94 % O2 Device: None (Room air) Oxygen Delivery: 2 LPM  Weight: 136 lbs 7.44 oz  General Appearance: Well appearing for stated age.  Respiratory: CTAB, no rales or ronchi  Cardiovascular: S1, S2 normal, no murmurs  GI: non-tender on palpation, BS present      Data   Recent Labs   Lab 04/29/22  1839 04/28/22  0733 04/27/22  0722 04/27/22  0555 04/26/22  0831 04/25/22  0754 04/24/22  1323 04/24/22  1322   WBC 19.3*  --  18.2*  --  17.2* 15.6*  --   --    HGB 10.8*  --  10.0*  --  9.5* 10.6*  --   --    MCV 94  --  93  --  91 97  --   --      --  246  --  239 244  --   --    NA  --   --   --   --  136 136  --  132*   POTASSIUM  --   --   --   --  4.0 4.2  --  4.2   CHLORIDE  --   --   --   --  109 110*  --  107   CO2  --   --   --   --  21 21  --  20   BUN  --   --   --   --  15 14  --  18   CR  --  0.78  --   --  0.81 0.88   < > 0.82   ANIONGAP  --   --   --   --  6 5  --  5   MYRIAM  --   --   --   --  8.0* 7.7*  --  8.2*   GLC  --   --   --  107* 100* 110*  --  148*   ALBUMIN  --   --   --   --   --  1.9*  --  2.2*   PROTTOTAL  --   --   --   --   --  5.4*  --  6.2*   BILITOTAL  --   --   --   --   --  0.6  --  0.5   ALKPHOS  --   --   --   --   --  88  --  67   ALT  --   --   --   --   --  38  --  33   AST  --   --   --   --   --  37  --  26    < > = values in this interval not displayed.

## 2022-04-30 NOTE — PLAN OF CARE
Goal Outcome Evaluation:    Pt is A & O x4, sometimes forgetful, cms intact, bowel sounds active. Bruised all over. Was having bladder retention. Coley was bladder. Received tylenol for pain. IV fluid was started. On IV antibiotics. Dressing was changed. Was sent to preop for surgery.

## 2022-04-30 NOTE — ANESTHESIA PREPROCEDURE EVALUATION
Anesthesia Pre-Procedure Evaluation    Patient: Tamar Rose   MRN: [id number] : 1935        Procedure : Procedure(s):  IRRIGATION AND DEBRIDEMENT, SHOULDER Arthroscopic          Past Medical History:   Diagnosis Date     NO ACTIVE PROBLEMS       Past Surgical History:   Procedure Laterality Date     ARTHROSCOPY SHOULDER, OPEN DEBRIDEMENT, COMBINED Right 2022    Procedure: IRRIGATION AND DEBRIDEMENT, RIGHT NATIVE SHOULDER;  Surgeon: Baljinder Cline MD;  Location: [facility] OR     HYSTERECTOMY, MATT        Allergies   Allergen Reactions     Sulfa Drugs Rash      Social History     Tobacco Use     Smoking status: Never Smoker     Smokeless tobacco: Not on file   Substance Use Topics     Alcohol use: Yes     Comment: occ      Wt Readings from Last 1 Encounters:   22 61.9 kg (136 lb 7.4 oz)        Anesthesia Evaluation   Pt has had prior anesthetic.     No history of anesthetic complications       ROS/MED HX  ENT/Pulmonary:     (+) recent URI, resolved, ?CAP on admission, rx with abx:  (-) sleep apnea   Neurologic:    (-) no CVA   Cardiovascular:     (+) Dyslipidemia -----valvular problems/murmurs type: AI and MR AR - mod/severe with mass, MR - mild/mod.  (-) CAD   METS/Exercise Tolerance:     Hematologic:  - neg hematologic  ROS   (+) anemia,     Musculoskeletal:       GI/Hepatic: Comment: Nauseous   (-) GERD   Renal/Genitourinary:       Endo:    (-) Type II DM   Psychiatric/Substance Use:       Infectious Disease: Comment: Septic shoulder      Malignancy:       Other:            Physical Exam    Airway  airway exam normal      Mallampati: II   TM distance: > 3 FB   Neck ROM: full   Mouth opening: > 3 cm    Respiratory Devices and Support         Dental  no notable dental history         Cardiovascular   cardiovascular exam normal          Pulmonary   pulmonary exam normal                         Echocardiogram LOKESH  Order: [id number]   Status: Edited Result - FINAL     Visible to patient: No (inaccessible  in MyChart)     Next appt: 2022 at 08:15 AM in Occupational Therapy (Annika Bullard OT)     0 Result Notes    Details    Reading Physician Reading Date Result Priority   Bryan Tirado MD  [phone] 2022      Narrative & Impression  [id number]  [id number]  [id number]  [id number]^CAITLIN^CESARIO     [facility]  Echocardiography Laboratory  [facility] [place] [address]     Name: RAGHAVENDRA MILLAN  MRN: [id number]  : 1935  Study Date: 2022 03:09 PM  Age: 87 yrs  Gender: Female  Patient Location: [facility]  Reason For Study: Endocarditis  Ordering Physician: CESARIO TUCKER  Referring Physician: Armida Betancourt  Performed By: Miguel Angel Zhang     BSA: 1.7 m2  Height: 67 in  Weight: 132 lb  HR: 101  BP: 109/53 mmHg  ______________________________________________________________________________  Procedure  Complete LOKESH Adult. LOKESH Probe serial #[id number] was used during the procedure.  The heart rate, respiratory rate and response to care were monitored  throughout the procedure with the assistance of the nurse.  ______________________________________________________________________________  Interpretation Summary     The left ventricle is normal in size.  The visual ejection fraction is 60-65%.  No regional wall motion abnormalities noted.  There is a large vegetation or mass on the aortic valve.  Mobile 1cm mass associated with the left coronary leaflet of the aortic valve  HIGHLY SUSPICIOUS for infective endocarditis with associated aortic  regurgitation.  There is moderately severe (3+) aortic regurgitation.  There is a small vegetation or mass on the mitral valve.  0.5 cm posterior leaflet homogenous density  There is mild to moderate (1-2+) mitral regurgitation.     Compared to the TTE from 2022 the aortic regurgitation is new and along  with the visible mass on the aortic and to a lesser extent the mitral valve is  HIGHLY SUGGESTIVE OF INFECTIVE  ENDOCARDITIS with damage to the function of the  aortic valve.     Dr. Kaylyn Martinez messaged at 4:05pm of results with response  confirmed.  ______________________________________________________________________________  LOKESH  I determined this patient to be an appropriate candidate for the planned  sedation and procedure and have reassessed the patient immediately prior to  sedation and procedure. Total sedation time: 24 minutes of continuous bedside  1:1 monitoring. Versed (3mg) was given intravenously. Fentanyl (75mcg) was  given intravenously. The transesophageal probe was passed without difficulty.     Left Ventricle  The left ventricle is normal in size. The visual ejection fraction is 60-65%.  No regional wall motion abnormalities noted.     Right Ventricle  The right ventricle is normal in size and function.     Atria  The left atrium is moderately dilated. The right atrium is moderately dilated.  A contrast injection (Bubble Study) was performed that was negative for flow  across the interatrial septum. No thrombus is detected in the left atrial  appendage.     Mitral Valve  There is mild mitral annular calcification. The mitral valve leaflets appear  thickened, but open well. There is a small vegetation or mass on the mitral  valve. 0.5 cm posterior leaflet homogenous density. There is mild to moderate  (1-2+) mitral regurgitation.     Tricuspid Valve  The tricuspid valve is normal in structure and function. There is trace  tricuspid regurgitation.     Aortic Valve  There is a large vegetation or mass on the aortic valve. Mobile 1cm mass  associated with the left coronary leaflet of the aortic valve HIGHLY  SUSPICIOUS for infective endocarditis with associated aortic regurgitation.  There is moderately severe (3+) aortic regurgitation.     Pulmonic Valve  The pulmonic valve is normal in structure and function.     Vessels  The aortic root is normal size.     Pericardial/Pleural  Trivial  pericardial effusion.     Rhythm  The rhythm was sinus tachycardia.  ______________________________________________________________________________  Report approved by: Justin Tijerina 04/29/2022 04:21 PM         OUTSIDE LABS:  CBC:   Lab Results   Component Value Date    WBC 19.3 (H) 04/29/2022    WBC 18.2 (H) 04/27/2022    HGB 10.8 (L) 04/29/2022    HGB 10.0 (L) 04/27/2022    HCT 33.2 (L) 04/29/2022    HCT 30.7 (L) 04/27/2022     04/29/2022     04/27/2022     BMP:   Lab Results   Component Value Date     04/26/2022     04/25/2022    POTASSIUM 4.0 04/26/2022    POTASSIUM 4.2 04/25/2022    CHLORIDE 109 04/26/2022    CHLORIDE 110 (H) 04/25/2022    CO2 21 04/26/2022    CO2 21 04/25/2022    BUN 15 04/26/2022    BUN 14 04/25/2022    CR 0.78 04/28/2022    CR 0.81 04/26/2022     (H) 04/27/2022     (H) 04/26/2022     COAGS: No results found for: PTT, INR, FIBR  POC: No results found for: BGM, HCG, HCGS  HEPATIC:   Lab Results   Component Value Date    ALBUMIN 1.9 (L) 04/25/2022    PROTTOTAL 5.4 (L) 04/25/2022    ALT 38 04/25/2022    AST 37 04/25/2022    ALKPHOS 88 04/25/2022    BILITOTAL 0.6 04/25/2022     OTHER:   Lab Results   Component Value Date    PH 7.28 (L) 04/24/2022    LACT 2.1 (H) 04/24/2022    MYRIAM 8.0 (L) 04/26/2022    .0 (H) 04/29/2022       Anesthesia Plan    ASA Status:  4   NPO Status:  NPO Appropriate    Anesthesia Type: General.     - Airway: ETT   Induction: Intravenous.   Maintenance: Balanced.   Techniques and Equipment:     - Lines/Monitors: Arterial Line     Consents    Anesthesia Plan(s) and associated risks, benefits, and realistic alternatives discussed. Questions answered and patient/representative(s) expressed understanding.    - Discussed:     - Discussed with:  Patient         Postoperative Care    Pain management: IV analgesics, Oral pain medications.   PONV prophylaxis: Ondansetron (or other 5HT-3), Dexamethasone or Solumedrol, Background  Propofol Infusion     Comments:                Torsten Ridley MD

## 2022-04-30 NOTE — CODE/RAPID RESPONSE
[facility]    House NP Note  4/30/2022      called for: Evaluation of A. fib with RVR in PACU    Assessment & Plan   IMPRESSION & PLAN:    Tamar Rose is a 87 year old female w/ PMH of colon cancer hyperlipidemia, osteoporosis, GERD, colitis, iron deficient anemia who was admitted on 4/24/2022 for concerns for septic joint ultimately found to have MSSA bacteremia most likely from the right septic shoulder joint.    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  She has been seen in consultation by CV surgery and is pending a care conference to help determine how to proceed as surgical valve replacement portends a high risk of morbidity mortality given age frailty and large surgery.  While awaiting this patient underwent second I&D with endotracheal anesthesia and orthopedics on afternoon forward 30 2022.  In the postoperative state patient had chest pain that preceded A. fib with RVR with rates in the 180s.  This is managed by the PACU anesthesiologist with 100 mg esmolol, 325 mg aspirin, 20 mg IV diltiazem bolus followed by 50 mg/h Cardizem infusion.  Initial EKG did not show any acute ischemic changes.  Heart rate came down to the 120s 140s still with ongoing chest pain.  She was given 5 mg IV metoprolol as well as a total of 125 mcg of fentanyl.  I was asked to essentially assume care of patient in the PACU and help facilitate transfer to higher level of care    Patient continues to endorse 8/10 chest pain she points to the center and left side of her chest reports it radiates to her left shoulder.  She largely denies dyspnea, presyncope, nausea any prior chest pain.  She denies any palpitations.  She is not clammy or diaphoretic heart rate ranges from 90s to 1 teens SBP's by brachial arterial line are 80s to 90 systolic MAP greater than 65 mmHg    I personally reviewed the radiographs: cxr  w/o pneumothorax or discrete infiltrates, small bilateral pleural effusions, pulmonary vascular congestion    Impression  New onset A. fib now with fairly controlled ventricular response  Chest pain atypical type.  EKG not suggestive that this is ischemic etiology and A. fib rate not so fast that it would be the cause of her chest pain  Multivalve infective endocarditis with MSSA bacteremia secondary to septic right shoulder joint status post I&D x2    INTERVENTIONS:  -discussed w/ cardiologist on call re: difficult position as cardioversion is theoretically high risk for causing an embolic phenomenon but also coronary angiogram also high risk for worsening her situation.  Although she has chest pain is not clear that it is from her A. fib given the relatively controlled rate.  Additionally EKG is not suggestive that the chest pain is from coronary ischemia  -rate control not so bad that I think we need to cardiovert but treatment options for CP are limited by low normal BP making NTG and BB suboptimal while on cardizem gtt  -will give digoxin IV 250mcg, try to get off cardizem gtt so as to allow for higher BP to treat with other agents such as beta-blocker and or nitrates in addition to opioid analgesics  -Stat CBC, CMP, lactic acid, troponin  - stat portable chest x-ray  - Dilaudid 0.4 mg IV x1 if effective can use as needed every 2 hours  - We will repeat EKG--> continues to look nonischemic  - Will obtain a CT angio PE protocol to evaluate for alternate causes of chest pain  - Obtain a stat echo  -post first dose digoxin, down titrating Cardizem drip goal is to off  -We will schedule  metoprolol as well as IV as needed metoprolol for heart rate sustained greater than 120  -As needed IV hydromorphone  -We will make sublingual nitroglycerin available, alternatively could use nitroglycerin infusion if needed if pain not relieved with other agents  - Post CT chest she will be transferred to intensive care unit,  verbal handoff given between hospitalist attending physician and intensivist attending physician  -Patient's daughter has been updated by hospitalist attending physician    Working diagnosis: New onset A. fib now with controlled ventricular response low normal blood pressures still with ongoing chest pain but improved following hydromorphone in the setting of MSSA bacteremia/multivalve infective endocarditis    At the end of the RRT CT scan is pending, heart rate 105, SBP 80s to 90s patient resting more comfortably now w/ less severe CP    disposition transfer to ICU    Discussed with and defer further cares to hospitalist attending physician Dr. Alejandro     Code Status: Full Code    Allergies   Allergies   Allergen Reactions     Sulfa Drugs Rash       Physical Exam   Vital Signs with Ranges:  Temp:  [33.8  F (1  C)-98.7  F (37.1  C)] 98.6  F (37  C)  Pulse:  [] 99  Resp:  [12-24] 14  BP: (111-143)/(45-72) 143/64  MAP:  [58 mmHg-111 mmHg] 85 mmHg  Arterial Line BP: ()/(41-88) 94/72  SpO2:  [90 %-100 %] 97 %  I/O last 3 completed shifts:  In: -   Out: 2025 [Urine:1500; Other:525]    Constitutional: vs as per EMR  General:  adult pt lying in bed uncomfortable appearing  Neuro: +follows commands wiggle toes and show 2 fingers bilat, face symmetric, tongue midline, speech fluent  Eyes pupils equal round 3mm briskly reactive bilat, sclera nonicteric, noninjected, conjunctiva pink,  Head, ENT & mouth: NC/AT, dry oral mucosa  Neck: supple  CV irregularly irregular rate and rhythm  resp: CTAB upper  lobes  gi:normoactive bowel sounds, soft, nontender, nondisteded  Ext: no edema, no mottling, right shoulder with clean dry intact dressing  Skin: no rashes on exposed skin  Lymph: Defer  Musculoskeletal no bony joint deformities aside from right shoulder      Data     EKG:  Interpreted by MORALES Torres CNP  Time reviewed: 510pm  Symptoms at time of EKG: Chest pain  Rhythm: atrial fibrillation -  controlled  Rate: Tachycardia  Axis: Normal  Ectopy: none  Conduction: normal  ST Segments/ T Waves: T wave inversion aVL  Q Waves: aVl  Comparison to prior: Unchanged from prior study      ABG:  -  Recent Labs   Lab 04/24/22  1339   PH 7.28*       Troponin:    Pending    IMAGING: (X-ray/CT/MRI)   Recent Results (from the past 24 hour(s))   XR Chest Port 1 View    Narrative    EXAM: XR CHEST PORT 1 VIEW  LOCATION: [facility]  DATE/TIME: 4/30/2022 5:14 PM    INDICATION: Chest pain.  COMPARISON: 04/24/2022.      Impression    IMPRESSION: Previously noted bilateral lower lung infiltrates have improved. There may be a small amount of pleural fluid bilaterally. Heart size is stable. Pulmonary vascularity is within normal limits.       CBC with Diff:  Recent Labs   Lab Test 04/30/22  1726   WBC 21.5*   HGB 9.0*   MCV 95         Comprehensive Metabolic Panel:  Recent Labs   Lab 04/30/22  1726 04/26/22  0831 04/25/22  0754      < > 136   POTASSIUM 3.7   < > 4.2   CHLORIDE 108   < > 110*   CO2 20   < > 21   ANIONGAP 10   < > 5   *   < > 110*   BUN 10   < > 14   CR 0.54   < > 0.88   GFRESTIMATED 89   < > 64   MYRIAM 6.9*   < > 7.7*   MAG 1.8  --   --    PROTTOTAL  --   --  5.4*   ALBUMIN  --   --  1.9*   BILITOTAL  --   --  0.6   ALKPHOS  --   --  88   AST  --   --  37   ALT  --   --  38    < > = values in this interval not displayed.       UA:  Recent Labs   Lab 04/24/22  1636   COLOR Light Yellow   APPEARANCE Clear   URINEGLC Negative   URINEBILI Negative   URINEKETONE Negative   SG 1.023   UBLD Small*   URINEPH 6.0   PROTEIN 10 *   NITRITE Negative   LEUKEST Negative   RBCU 1   WBCU 2       Time Spent on this Encounter   I spent 75 minutes (450 - 625pm) of critical care time on the unit/floor managing the care of Tamar Rose. Upon evaluation, this patient had a high probability of imminent or life-threatening deterioration due to chest pain, afib w/ RVR, which required my  direct attention, intervention, and personal management including reviewing ekgs, treating CP, adjusting afib rate controlling agents 100% of my time was spent at the bedside counseling the patient and/or coordinating care regarding services listed in this note.    [facility]  Hospitalist House LILLIAN  [phone]

## 2022-04-30 NOTE — PROGRESS NOTES
Date/Time:04/30,5365-9536    Trauma/Ortho/Medical (Choose one) :Ortho    Diagnosis:I&D Right Native Shoulder  POD#:5  Mental Status:A&O X 4  Activity/dangle: A-1-2 gait belt to bedside commode.  Diet:Regular  Pain:Managed with scheduled tylenol and Ice   Coley/Voiding:Beside commode  Tele/Restraints/Iso:None  02/LDA:RA  D/C Date:TBD  Other Info:NPO as from midnight,I&D right shoulder  scheduled 04/30.  IV SL.   ml, urinated 75 ml

## 2022-04-30 NOTE — PROGRESS NOTES
Care Coordination:    Writer called daughter per MD request to set up meeting time for family to met with CV surgery. Viktoriya is available at 10:00am in the morning. Meeting will take place in [facility]. Paged CV surgery PA, Bing.    Denisa Knight RN  BSN, Care Coordinator    [facility]/ Care Transitions          [email or web address]               [phone]

## 2022-04-30 NOTE — OR NURSING
Pt went into Afib with RVR. Dr. Ridley and Dr. Stone with pt. Dr. Ridley gave Esmolol total of 100 mg IV push. Diltiazem 20 mg given IV Push. Diltiazem IV started at 10 mg and increased to 15 mg/hr

## 2022-04-30 NOTE — CONSULTS
[facility]   Critical Care Consult  Tamar Rose   MRN: [id number]  : 1935  Date of Admission:2022  Primary care provider: Armida Betancourt [phone]  ___________________________________      Chief Complaint   Indication for critical care admission: cardiac arrhythmia    Assessment & Plan    Tamar Rose is a 87 year old female w/ PMH of colon cancer s/p colectomy 2017, hyperlipidemia, osteoporosis, GERD, colitis, iron deficient anemia who was admitted on 2022 for concerns for septic joint ultimately found to have MSSA bacteremia and multi-valve endocarditis most likely from the right septic shoulder joint.    Neurological/Psychological    **Sedation: none  **Pain/Agitation: dilaudid    No acute issues    Cardiovascular    New onset Rapid Afib with RVR:   occurred post-op s/p shoulder I&D. Rates 120-130s, BP stable throughout but patient did have chest pain. In setting of multivalve endocarditis, conversion deemed too risky for embolic event. Tried esmolol and then needed dilt gtt. Cardiology consulted during this time. CT chest ordered to rule out PE and stat Echo as well   - CT chest 22 negative for PE & showed small to moderate bilateral pleural effusions   - Echo tonight   - Transition Dilt drip to Amiodarone per Cards (appreciate recs)    Multi valve endocarditis:   Seeded from septic joint. Vegetations on mitral and aortic valve. CV surg on board and Surgical AVR/MVR indicated.  ** s/p I&D shoulder x2 (most recent 2022)   - family conference 22   - continue cefazolin (-now)   - most recent neg blood culture ()    Pulmonary    Possible pneumonia: resolved  Admission CXR demonstrating right basilar infiltrates, CT abd/pelvis with groundglass opacities of right base, chest CT with bilateral small to moderate pleural effusions.  Treated for CAP with 7-day course of Ceftin starting 22, with resolution of productive cough. Unclear if admission imaging reflects  resolved PNA vs new MSSA PNA.    - wean oxygen as tolerated with goal of off    Renal/Fluids/Electrolytes    Urinary retention  Noted to be retaining urine with nearly 1000 ml out on straight cath.    * very difficult catheterization requiring 8 Fr cath following admission  - per Urology, follow PVR and straight cath with 8Fr if >500 once Coley removed    Plan  - monitor function and electrolytes as needed with replacement per ICU protocols. - generally avoid nephrotoxic agents such as NSAID, IV contrast unless specifically required  - adjust medications as needed for renal clearance  - follow I/O's as appropriate.    **I/O last 24hrs:     Intake/Output Summary (Last 24 hours) at 4/30/2022 1836  Last data filed at 4/30/2022 1800  Gross per 24 hour   Intake 3000 ml   Output 2655 ml   Net 345 ml       Infectious Diseases    Endocarditis: see CV      Gastrointestinal/Genitourinary    Nausea: on admission, improved    **Nutrition: PO ad pito    Endocrine/Metabolic    Stress induced hyperglycemia.  Plan  - ICU insulin protocol, goal sugar <180    Hematology/Oncology    Anemia: likely a mix of surgery and dilutional given ongoing infection and fluid needs for recusitation   - continue to monitor    Skin/Musculoskeletal    Septic Arthritis Right Shoulder:   Per family, underwent steroid injection to shoulder 4/19/22 with subsequent increasing edema and pain refractory to PTA oxycodone.  * admission leukocytosis of 22 with lactate of 2.4;   * arthrocentesis 4/24 with turbid fluid with Staph aureus on culture; also noted calcium pyrophosphate crystals  * 2/2 admission blood cultures positive for MSSA  * repeat I&D 4/30/2022    ------------------------------------------------------------------------------------------------------------------  Prophylaxis/Daily Checklist    -DVT: Mechanical    -VAP: HOB 30 degrees, chlorhexidine rinse    -Stress Ulcer: Not indicated    -Restraints: None    -IV Access: No central access at  this time     -Feeding - PO    Code status: Full  Disposition: if stable overnight, can leave ICU  =========================================================  I have personally reviewed the daily labs, imaging studies, cultures and discussed the case with referring physician and consulting physicians.     This patient is critically ill and I have provided 45 minutes of critical care time (excluding procedures) on 04/30/2022    Crys Bourne MD  Critical Care      Primary Care Physician   Armida Betancorut  History of Present Illness   History is obtained from the patient & her daughter and review of the EMR.  Tamar Rose is a 87 year old female w/ PMH of colon cancer hyperlipidemia, osteoporosis, GERD, colitis, iron deficient anemia who was admitted on 4/24/2022 for concerns for septic joint ultimately found to have MSSA bacteremia and multi-valve endocarditis with AI most likely from the right septic shoulder joint.    Patient had redo I&D 4/30/22.  In PACU, she experienced acute chest pain and was then noted to be in afib with RVR with rates in the 180s.  She was given esmolol, ASA, diltiazem bolus then infusion.  Chest pain continued but HR decreased to 120s-140s.  She was then given IV metoprolol and Fentanyl with improvement in chest pain.  She arrived to ICU with HR 130s still in afib with art line BPs MAP 70s on NC O2 alert, talkative, and eating ice cream.    Past Medical History    Patient Active Problem List    Diagnosis Date Noted     Pyogenic arthritis of right shoulder region, due to unspecified organism (H) 04/24/2022     Priority: Medium     CARDIOVASCULAR SCREENING; LDL GOAL LESS THAN 160 10/31/2010     Priority: Medium     Past Surgical History   Past Surgical History:   Procedure Laterality Date     ARTHROSCOPY SHOULDER, OPEN DEBRIDEMENT, COMBINED Right 4/25/2022    Procedure: IRRIGATION AND DEBRIDEMENT, RIGHT NATIVE SHOULDER;  Surgeon: Baljinder Cline MD;  Location: [facility] OR     HYSTERECTOMY, MATT         Social History   Social History     Socioeconomic History     Marital status: Single     Spouse name: Not on file     Number of children: Not on file     Years of education: Not on file     Highest education level: Not on file   Occupational History     Not on file   Tobacco Use     Smoking status: Never Smoker     Smokeless tobacco: Not on file   Substance and Sexual Activity     Alcohol use: Yes     Comment: occ     Drug use: Not on file     Sexual activity: Not on file   Other Topics Concern     Parent/sibling w/ CABG, MI or angioplasty before 65F 55M? Not Asked   Social History Narrative     Not on file     Social Determinants of Health     Financial Resource Strain: Not on file   Food Insecurity: Not on file   Transportation Needs: Not on file   Physical Activity: Not on file   Stress: Not on file   Social Connections: Not on file   Intimate Partner Violence: Not on file   Housing Stability: Not on file     Family History    Family History   Problem Relation Age of Onset     Cancer Maternal Grandmother      Allergies   Allergies   Allergen Reactions     Sulfa Drugs Rash      Prior to Admission Medications    Prior to Admission Medications   Prescriptions Last Dose Informant Patient Reported? Taking?   ibuprofen (ADVIL/MOTRIN) 200 MG tablet  at prn  Yes Yes   Sig: Take 200 mg by mouth every 4 hours as needed for mild pain   methylPREDNISolone (MEDROL DOSEPAK) 4 MG tablet therapy pack 4/24/2022 at AM  Yes Yes   Sig: Take by mouth See Admin Instructions Follow Package Directions   oxyCODONE-acetaminophen (PERCOCET) 5-325 MG tablet 4/24/2022 at 0200  No No   Sig: Take 0.5-1 tablets by mouth every 6 hours as needed for pain   rosuvastatin (CRESTOR) 20 MG tablet 4/23/2022 at AM  Yes Yes   Sig: Take 20 mg by mouth daily      Facility-Administered Medications: None     Current Facility-Administered Medications   Medication     [Auto Hold] acetaminophen (TYLENOL) tablet 650 mg    Or     [Auto Hold] acetaminophen  (TYLENOL) Suppository 650 mg     aspirin (ASA) tablet 325 mg     [Held by provider] aspirin EC tablet 81 mg     [Auto Hold] benzocaine-menthol (CHLORASEPTIC) 6-10 MG lozenge 1 lozenge     [Auto Hold] bisacodyl (DULCOLAX) Suppository 10 mg     [Auto Hold] calcium carbonate (TUMS) chewable tablet 500 mg     [Auto Hold] ceFAZolin (ANCEF) intermittent infusion 2 g in 100 mL dextrose PRE-MIX     diltiazem (CARDIZEM) 125 mg in sodium chloride 0.9 % 125 mL infusion     diltiazem (CARDIZEM) injection 10 mg     fentaNYL (PF) (SUBLIMAZE) injection 25 mcg     HYDROmorphone (DILAUDID) injection 0.2 mg     [Auto Hold] HYDROmorphone (DILAUDID) injection 0.2 mg     HYDROmorphone (DILAUDID) injection 0.4 mg     HYDROmorphone (DILAUDID) injection 0.4 mg     iopamidol (ISOVUE-370) solution 58 mL     lactated ringers infusion     [Auto Hold] lidocaine (LMX4) cream     [Auto Hold] lidocaine 1 % 0.1-1 mL     [Auto Hold] magnesium hydroxide (MILK OF MAGNESIA) suspension 30 mL     [Auto Hold] melatonin tablet 1 mg     metoprolol (LOPRESSOR) injection 5 mg     metoprolol (LOPRESSOR) injection 5 mg     [Auto Hold] naloxone (NARCAN) injection 0.2 mg    Or     [Auto Hold] naloxone (NARCAN) injection 0.4 mg    Or     [Auto Hold] naloxone (NARCAN) injection 0.2 mg    Or     [Auto Hold] naloxone (NARCAN) injection 0.4 mg     nitroGLYcerin (NITROSTAT) sublingual tablet 0.4 mg     ondansetron (ZOFRAN-ODT) ODT tab 4 mg    Or     ondansetron (ZOFRAN) injection 4 mg     [Auto Hold] ondansetron (ZOFRAN-ODT) ODT tab 4 mg    Or     [Auto Hold] ondansetron (ZOFRAN) injection 4 mg     [Auto Hold] oxyCODONE (ROXICODONE) tablet 5 mg     [Auto Hold] polyethylene glycol (MIRALAX) Packet 17 g     [Auto Hold] polyethylene glycol (MIRALAX) Packet 17 g     [Auto Hold] prochlorperazine (COMPAZINE) injection 5 mg    Or     [Auto Hold] prochlorperazine (COMPAZINE) tablet 5 mg     [Auto Hold] rosuvastatin (CRESTOR) tablet 20 mg     Saline     [Auto Hold]  "senna-docusate (SENOKOT-S/PERICOLACE) 8.6-50 MG per tablet 1 tablet    Or     [Auto Hold] senna-docusate (SENOKOT-S/PERICOLACE) 8.6-50 MG per tablet 2 tablet     [Auto Hold] sodium chloride (PF) 0.9% PF flush 3 mL     [Auto Hold] sodium chloride (PF) 0.9% PF flush 3 mL     sodium chloride 0.9% infusion        Review of Systems     Please see HPI. All other systems were reviewed and are found to be negative and non-contributory.       Physical Exam   BP (!) 143/64   Pulse 99   Temp 98.6  F (37  C) (Temporal)   Resp 14   Ht 1.702 m (5' 7\")   Wt 61.9 kg (136 lb 7.4 oz)   SpO2 97%   BMI 21.37 kg/m    Wt Readings from Last 1 Encounters:   04/29/22 61.9 kg (136 lb 7.4 oz)    Body mass index is 21.37 kg/m .     Resp: 14      General: no acute distress, alert, pleasant, eating ice cream  HEENT: NCAT, trachea midline, sclera anicteric  Neck/Thyroid: supple  Lungs: unlabored , clear anteriorly    Cardiovascular:  Irreg Irreg S1S2 no gallop,  No rub, II/VI JOSEF at LUSB  Abdomen: soft nontender, hypoactive bowel sounds, no mass  /Rectal:  deferred  MSK: normal muscle bulk and tone  Skin: no obvious rashes, ecchymoses to arms and ankles  Extremities: edema, warm  Neurological: grossly intact    Intake/Output Summary (Last 24 hours) at 4/30/2022 1836  Last data filed at 4/30/2022 1800  Gross per 24 hour   Intake 3000 ml   Output 2655 ml   Net 345 ml       Data   Labs & Studies of Note: I personally reviewed the following studies:    Imaging:   Recent Results (from the past 24 hour(s))   XR Chest Port 1 View    Narrative    EXAM: XR CHEST PORT 1 VIEW  LOCATION: [facility]  DATE/TIME: 4/30/2022 5:14 PM    INDICATION: Chest pain.  COMPARISON: 04/24/2022.      Impression    IMPRESSION: Previously noted bilateral lower lung infiltrates have improved. There may be a small amount of pleural fluid bilaterally. Heart size is stable. Pulmonary vascularity is within normal limits.   ROUTINE ICU LABS (Last " four results)  ROUTINE ICU LABS (Last four results)  CMP  Recent Labs   Lab 04/30/22  1726 04/28/22  0733 04/27/22  0555 04/26/22  0831 04/25/22  0754 04/24/22  1323 04/24/22  1322     --   --  136 136  --  132*   POTASSIUM 3.7  --   --  4.0 4.2  --  4.2   CHLORIDE 108  --   --  109 110*  --  107   CO2 20  --   --  21 21  --  20   ANIONGAP 10  --   --  6 5  --  5   *  --  107* 100* 110*  --  148*   BUN 10  --   --  15 14  --  18   CR 0.54 0.78  --  0.81 0.88   < > 0.82   GFRESTIMATED 89 74  --  70 64   < > 69   MYRIAM 6.9*  --   --  8.0* 7.7*  --  8.2*   MAG 1.8  --   --   --   --   --   --    PROTTOTAL  --   --   --   --  5.4*  --  6.2*   ALBUMIN  --   --   --   --  1.9*  --  2.2*   BILITOTAL  --   --   --   --  0.6  --  0.5   ALKPHOS  --   --   --   --  88  --  67   AST  --   --   --   --  37  --  26   ALT  --   --   --   --  38  --  33    < > = values in this interval not displayed.     CBC  Recent Labs   Lab 04/30/22  1726 04/29/22  1839 04/27/22  0722 04/26/22  0831   WBC 21.5* 19.3* 18.2* 17.2*   RBC 2.93* 3.54* 3.29* 3.12*   HGB 9.0* 10.8* 10.0* 9.5*   HCT 27.7* 33.2* 30.7* 28.3*   MCV 95 94 93 91   MCH 30.7 30.5 30.4 30.4   MCHC 32.5 32.5 32.6 33.6   RDW 15.4* 15.1* 15.5* 15.6*    298 246 239     INRNo lab results found in last 7 days.  Arterial Blood Gas  Recent Labs   Lab 04/24/22  1339   PH 7.28*       Inflammatory Markers    Recent Labs   Lab Test 04/30/22  0742 04/29/22  0724 04/28/22  0733 04/27/22  0722 04/26/22  0831 04/25/22  0754 04/24/22  1322   .0* 168.0* 135.0* 122.0* 235.0* 260.0* 296.0*     Immune Globulin Studies  No lab results found.    Microbiology:  No results found for: CULT  No lab results found.    Urine Studies:    Recent Labs   Lab Test 04/24/22  1636   LEUKEST Negative   NITRITE Negative   WBCU 2   RBCU 1

## 2022-04-30 NOTE — ANESTHESIA PROCEDURE NOTES
Airway       Patient location during procedure: OR       Procedure Start/Stop Times: 4/30/2022 2:01 PM  Staff -        Anesthesiologist:  Torsten Ridley MD       CRNA: Karina Wayne APRN CRNA       Performed By: CRNA  Consent for Airway        Urgency: elective  Indications and Patient Condition       Indications for airway management: tai-procedural       Induction type:intravenous       Mask difficulty assessment: 2 - vent by mask + OA or adjuvant +/- NMBA    Final Airway Details       Final airway type: endotracheal airway       Successful airway: ETT - single  Endotracheal Airway Details        ETT size (mm): 7.0       Successful intubation technique: video laryngoscopy       VL Blade Size: Glidescope 3       Grade View of Cords: 1       Adjucts: stylet       Position: Left       Measured from: lips       Secured at (cm): 21       Bite block used: None    Post intubation assessment        Placement verified by: capnometry, equal breath sounds and chest rise        Number of attempts at approach: 1       Secured with: silk tape       Ease of procedure: easy       Dentition: Intact and Unchanged    Medication(s) Administered   Medication Administration Time: 4/30/2022 2:01 PM

## 2022-05-01 NOTE — PROGRESS NOTES
"Orthopedic Surgery  Tamar Rose  Admit Date:  4/24/2022  POD 1 Day Post-Op  S/P Procedure(s):  IRRIGATION AND DEBRIDEMENT, SHOULDER Arthroscopic  RIGHT repeat I&D 4/30  S/p I&D right shoulder 4/25    Patient resting comfortably in bed.  She reports only mild right shoulder pain.   Pain controlled at rest.  Tolerating oral intake.    Denies nausea or vomiting  No events overnight.     Alert and orient to person and place  Vital Sign Ranges  /43   Pulse 71   Temp 97.9  F (36.6  C) (Axillary)   Resp 16   Ht 1.702 m (5' 7\")   Wt 61 kg (134 lb 7.7 oz)   SpO2 98%   BMI 21.06 kg/m      Dressing is clean, dry, and intact.   Sensation intact in upper arm over biceps   Motor and sensation of fingers intact.   +Radial pulse  +cap refill     Labs:  WBC Count   Date Value Ref Range Status   05/01/2022 18.1 (H) 4.0 - 11.0 10e3/uL Final     Hemoglobin   Date Value Ref Range Status   05/01/2022 9.1 (L) 11.7 - 15.7 g/dL Final   ]  CX MSSA    Elevated troponins   A/P  1. S/p repeat arthroscopic I&D of septic native right shoulder.               Continue amb and SCDs for DVT prophylaxis.                Mobilize with PT/OT               WBAT               Sling for comfort              IV abx per ID.                Continue current pain regimen.    2. Disposition   Anticipate d/c to TCU based on medical clearance    Radha SWANSON  [facility]  [phone]    "

## 2022-05-01 NOTE — PROGRESS NOTES
Paged Cardiology regarding amiodarone gtt stop time. Awaiting call back.  Received call back, per Cardiology leave it on for now.

## 2022-05-01 NOTE — PROGRESS NOTES
Inpatient Cardiology Consultation Progress Note:    Tamar Rose MRN#: [id number]   YOB: 1935 Age: 87 year old     Date of Admission: 4/24/2022  Consult indication: atrial fibrillation, chest pain          Assessment and Plan:     # Chest pain, likely due to atrial fibrillation with RVR (heart rate as high as 190 bpm).  Currently chest pain-free.    Given large mobile vegetation on aortic valve and also vegetation on mitral valve, considered embolization down to coronary artery, however ECG did not demonstrate any significant ischemic changes (when heart rate was 100 bpm).  Troponin only mildly elevated.  CT PE study negative for PE, did demonstrate small to moderate bilateral pleural effusions.  TTE 4/30/2022 LVEF >70%.   # Atrial fibrillation with RVR, new onset at approx 1600 in PACU s/p Right shoulder I&D 4/30/22.  Now converted to NSR on amiodarone GTT.   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  Pulse pressure stable from this morning, patient not hypoxic.  # Septic arthritis s/p I&D 4/30/2022  # Mildly elevated troponin, likely type II MI secondary to above.       - Will change amiodarone gtt to amiodarone 200mg BID for 14 days then 200mg daily for 30 days, can extend if needed  - Agree with aspirin  - Generally would recommend anticoagulation, however risks likely outweigh benefits  - Continue cardiac telemetry, daily ECGs  - Yesterday discussed with patient and daughter, per patient today there was a large family meeting with Dr. Del Angel, patient is still undecided about surgery.   - Cardiology will follow    Thank you for allowing our team to participate in the care of Tamar Rose.  Please do not hesitate to page me with any questions or concerns.     Jaycob Gordon MD, [facility]  Cardiology  May 1, 2022    Voice recognition software utilized.          Interval Events:     - converted to NSR on amiodarone GTT last night  - TTE demonstrates LVEF  >70%, mod-severe AI, moderate TR, mod-severe MR  - no chest pain, some shoulder pain from the surgery  - feels significantly better today than yesterday          Medications reviewed:     Current medications:  Current Facility-Administered Medications Ordered in Epic   Medication Dose Route Frequency Last Rate Last Admin     acetaminophen (TYLENOL) tablet 650 mg  650 mg Oral Q6H PRN   650 mg at 05/01/22 1315    Or     acetaminophen (TYLENOL) Suppository 650 mg  650 mg Rectal Q6H PRN         amiodarone in NS adult drip std conc 1.8 mg/mL infusion  0.5 mg/min Intravenous Continuous 16.67 mL/hr at 05/01/22 0800 0.5 mg/min at 05/01/22 0800     aspirin EC tablet 81 mg  81 mg Oral BID   81 mg at 05/01/22 0831     benzocaine-menthol (CHLORASEPTIC) 6-10 MG lozenge 1 lozenge  1 lozenge Buccal Q1H PRN         bisacodyl (DULCOLAX) Suppository 10 mg  10 mg Rectal Daily PRN         calcium carbonate (TUMS) chewable tablet 500 mg  500 mg Oral TID PRN   500 mg at 04/27/22 2155     ceFAZolin (ANCEF) intermittent infusion 2 g in 100 mL dextrose PRE-MIX  2 g Intravenous Q8H 200 mL/hr at 05/01/22 1325 2 g at 05/01/22 1325     HYDROmorphone (DILAUDID) injection 0.2-0.4 mg  0.2-0.4 mg Intravenous Q2H PRN   0.2 mg at 04/30/22 1909     lidocaine (LMX4) cream   Topical Q1H PRN         lidocaine 1 % 0.1-1 mL  0.1-1 mL Other Q1H PRN         magnesium hydroxide (MILK OF MAGNESIA) suspension 30 mL  30 mL Oral Daily PRN         melatonin tablet 1 mg  1 mg Oral At Bedtime PRN   1 mg at 04/26/22 2101     metoprolol (LOPRESSOR) injection 5 mg  5 mg Intravenous Q4H PRN         naloxone (NARCAN) injection 0.2 mg  0.2 mg Intravenous Q2 Min PRN        Or     naloxone (NARCAN) injection 0.4 mg  0.4 mg Intravenous Q2 Min PRN        Or     naloxone (NARCAN) injection 0.2 mg  0.2 mg Intramuscular Q2 Min PRN        Or     naloxone (NARCAN) injection 0.4 mg  0.4 mg Intramuscular Q2 Min PRN         nitroGLYcerin (NITROSTAT) sublingual tablet 0.4 mg  0.4 mg  Sublingual Q5 Min PRN         ondansetron (ZOFRAN-ODT) ODT tab 4 mg  4 mg Oral Q6H PRN   4 mg at 22    Or     ondansetron (ZOFRAN) injection 4 mg  4 mg Intravenous Q6H PRN         oxyCODONE (ROXICODONE) tablet 5 mg  5 mg Oral Q6H PRN   5 mg at 22 1435     polyethylene glycol (MIRALAX) Packet 17 g  17 g Oral Daily   17 g at 22 0831     polyethylene glycol (MIRALAX) Packet 17 g  17 g Oral Daily PRN         prochlorperazine (COMPAZINE) injection 5 mg  5 mg Intravenous Q6H PRN        Or     prochlorperazine (COMPAZINE) tablet 5 mg  5 mg Oral Q6H PRN   5 mg at 22 0058     rosuvastatin (CRESTOR) tablet 20 mg  20 mg Oral Daily   20 mg at 22 0831     senna-docusate (SENOKOT-S/PERICOLACE) 8.6-50 MG per tablet 1 tablet  1 tablet Oral BID   1 tablet at 22 2156    Or     senna-docusate (SENOKOT-S/PERICOLACE) 8.6-50 MG per tablet 2 tablet  2 tablet Oral BID   2 tablet at 22 0831     sodium chloride (PF) 0.9% PF flush 3 mL  3 mL Intracatheter Q8H   3 mL at 22 1325     sodium chloride (PF) 0.9% PF flush 3 mL  3 mL Intracatheter q1 min prn   3 mL at 22 1048     sodium chloride 0.9% infusion   Intravenous Continuous 100 mL/hr at 22 1559 New Bag at 22 1559     Current Outpatient Medications Ordered in Epic   Medication     acetaminophen (TYLENOL) 325 MG tablet     aspirin (ASA) 81 MG EC tablet     oxyCODONE-acetaminophen (PERCOCET) 5-325 MG tablet     senna-docusate (SENOKOT-S/PERICOLACE) 8.6-50 MG tablet             Physical Exam:   Vital signs were reviewed:  Temperatures:  Current - Temp: 98.1  F (36.7  C); Max - Temp  Av.4  F (36.9  C)  Min: 97.8  F (36.6  C)  Max: 98.8  F (37.1  C)  Respiration range: Resp  Av.3  Min: 9  Max: 34  Pulse range: Pulse  Av.3  Min: 67  Max: 141  Blood pressure range: Systolic (24hrs), Av , Min:87 , Max:147   ; Diastolic (24hrs), Av, Min:32, Max:82    Pulse oximetry range: SpO2  Av.8 %  Min: 89 %  Max:  100 %    Intake/Output Summary (Last 24 hours) at 5/1/2022 1631  Last data filed at 5/1/2022 1600  Gross per 24 hour   Intake 3438.11 ml   Output 1425 ml   Net 2013.11 ml     134 lbs 7.69 oz  Body mass index is 21.06 kg/m .   Body surface area is 1.7 meters squared.    Constitutional: appears stated age, in no apparent distress, Right shoulder dressing C/D/I  Pulmonary: clear to auscultation bilaterally  Cardiovascular: regular rate, regular rhythm, 2/6 JOSEF at the RUSB, no lower extremity edema  Gastrointestinal: no guarding, non-rigid   Neurologic: awake, alert, moves all extremities  Psychiatric: affect is normal, answers questions appropriately, oriented to self and place         Selected laboratory tests:   Laboratory test results personally reviewed:   CMP  Recent Labs   Lab 05/01/22  0836 05/01/22  0418 04/30/22  1726 04/28/22  0733 04/27/22  0555 04/26/22  0831 04/25/22  0754   NA  --  140 138  --   --  136 136   POTASSIUM  --  4.1 3.7  --   --  4.0 4.2   CHLORIDE  --  111* 108  --   --  109 110*   CO2  --  21 20  --   --  21 21   ANIONGAP  --  8 10  --   --  6 5   GLC 96 98 110*  --  107* 100* 110*   BUN  --  11 10  --   --  15 14   CR  --  0.64 0.54 0.78  --  0.81 0.88   GFRESTIMATED  --  86 89 74  --  70 64   MYRIAM  --  6.8* 6.9*  --   --  8.0* 7.7*   MAG  --   --  1.8  --   --   --   --    PROTTOTAL  --  4.5*  --   --   --   --  5.4*   ALBUMIN  --  1.4*  --   --   --   --  1.9*   BILITOTAL  --  0.2  --   --   --   --  0.6   ALKPHOS  --  125  --   --   --   --  88   AST  --  39  --   --   --   --  37   ALT  --  30  --   --   --   --  38     CBC  Recent Labs   Lab 05/01/22  1028 04/30/22  1726 04/29/22  1839 04/27/22  0722   WBC 18.1* 21.5* 19.3* 18.2*   RBC 2.97* 2.93* 3.54* 3.29*   HGB 9.1* 9.0* 10.8* 10.0*   HCT 27.4* 27.7* 33.2* 30.7*   MCV 92 95 94 93   MCH 30.6 30.7 30.5 30.4   MCHC 33.2 32.5 32.5 32.6   RDW 15.5* 15.4* 15.1* 15.5*    257 298 246     INRNo lab results found in last 7 days.  No  results found for: TROPI, TROPONIN, TROPR, TROPN  No results for input(s): CHOL, HDL, LDL, TRIG, CHOLHDLRATIO in the last 39391 hours.  No results found for: A1C  No results found for: TSH         Selected Imaging and Additional Data:   Additional data personally reviewed:  Recent Results (from the past 4320 hour(s))   Echocardiogram Limited   Result Value    LVEF  >70%    [facility]    [id number]  [id number]  [id number]  [id number]^RENE^VIRGEN^DAREN     [facility]  Echocardiography Laboratory  [address] [place] [id number]     Name: RAGHAVENDRA MILLAN  MRN: [id number]  : 1935  Study Date: 2022 09:26 PM  Age: 87 yrs  Gender: Female  Patient Location: [facility]  Reason For Study: Chest Pain  Ordering Physician: VIRGEN LÓPEZ  Referring Physician: Armida Betancourt  Performed By: Candida Morse     BSA: 1.7 m2  Height: 67 in  Weight: 136 lb  HR: 119  BP: 143/64 mmHg  ______________________________________________________________________________  Procedure  Limited Portable Echo Adult.  ______________________________________________________________________________  Interpretation Summary     Hyperdynamic left ventricular function. The visual ejection fraction is >70%.  Flattened septum is consistent with RV pressure/volume overload.  Right ventricle is normal in structure, function and size.  The aortic valve is trileaflet with aortic valve sclerosis. Moderate aortic  stenosis, VMax 3.5 m/s, mean gradient 28 mmHg, DI 0.31. There is mod-severe to  severe (3-4+) aortic regurgitation.  Moderate (2+) tricuspid regurgitation.  Pulmonary hypertension present. The right ventricular systolic pressure is  approximated at 39mmHg plus the right atrial pressure.  Moderate mitral annular calcification. Moderate to mod-severe (2-3+) mitral  regurgitation.  Cannot exclude vegetations, LOKESH would be more sensitive for assessment of  endocarditis.  Dilation of the inferior vena cava is present with abnormal  respiratory  variation in diameter.  Trivial pericardial effusion.  The rhythm was rapid atrial fibrillation.  Large left pleural effusion.     This study was compared to a LOKESH from 4/29/2022. Echodensities concerning for  endocaridits/vegetations on the prior study could not be appreciated on this  present TTE study due to shadowing/artifact. The severity of AI and MR appears  similar. Rhythm is now atrial fibrillation with RVR.     Cardiology consult team aware of these findings.     ______________________________________________________________________________  Left Ventricle  The left ventricle is normal in size. Proximal septal thickening is noted.  Hyperdynamic left ventricular function. The visual ejection fraction is >70%.  Flattened septum is consistent with RV pressure/volume overload.     Right Ventricle  The right ventricle is normal in structure, function and size.     Atria  The left atrium is mild to moderately dilated. The right atrium is mild to  moderately dilated.     Mitral Valve  There is moderate mitral annular calcification. There is moderate to mod-  severe (2-3+) mitral regurgitation.     Tricuspid Valve  There is moderate (2+) tricuspid regurgitation. The right ventricular systolic  pressure is approximated at 39mmHg plus the right atrial pressure. Pulmonary  hypertension.     Aortic Valve  The aortic valve is trileaflet with aortic valve sclerosis. There is mod-  severe to severe (3-4+) aortic regurgitation. Moderate aortic stenosis, VMax  3.5 m/s, mean gradient 28 mmHg, DI 0.31.     Pulmonic Valve  The pulmonic valve is not well visualized.     Vessels  The aortic root is normal size. Dilation of the inferior vena cava is present  with abnormal respiratory variation in diameter.     Pericardium  Trivial pericardial effusion. Large left pleural effusion.     Rhythm  The rhythm was rapid atrial  fibrillation.  ______________________________________________________________________________  MMode/2D Measurements & Calculations  IVSd: 0.99 cm     LVIDd: 4.3 cm  LVIDs: 2.2 cm  LVPWd: 1.2 cm  FS: 48.7 %  LV mass(C)d: 160.1 grams  LV mass(C)dI: 93.3 grams/m2  RWT: 0.55     Doppler Measurements & Calculations  MV max P.3 mmHg  MV mean PG: 3.1 mmHg  MV V2 VTI: 20.6 cm  Ao V2 max: 350.7 cm/sec  Ao max P.0 mmHg  Ao V2 mean: 250.1 cm/sec  Ao mean P.1 mmHg  Ao V2 VTI: 69.6 cm  AI P1/2t: 384.4 msec  LV V1 max P.7 mmHg  LV V1 max: 108.9 cm/sec  LV V1 VTI: 22.1 cm  TR max tolu: 276.9 cm/sec  TR max P.9 mmHg  AV Tolu Ratio (DI): 0.31     ______________________________________________________________________________  Report approved by: Justin Hartley 2022 10:15 PM         Echocardiogram LOKESH   Result Value    LVEF  60-65%    Narrative    [id number]  [id number]  [id number]^CAITLIN^CESARIO     [facility]  Echocardiography Laboratory  [address] [place] [id number]     Name: RAGHAVENDRA MILLAN  MRN: [id number]  : 1935  Study Date: 2022 03:09 PM  Age: 87 yrs  Gender: Female  Patient Location: [facility]  Reason For Study: Endocarditis  Ordering Physician: CESARIO TUCKER  Referring Physician: Armida Betancourt  Performed By: Miguel Angel Zhang     BSA: 1.7 m2  Height: 67 in  Weight: 132 lb  HR: 101  BP: 109/53 mmHg  ______________________________________________________________________________  Procedure  Complete LOKESH Adult. LOKESH Probe serial #[id number] was used during the procedure.  The heart rate, respiratory rate and response to care were monitored  throughout the procedure with the assistance of the nurse.  ______________________________________________________________________________  Interpretation Summary     The left ventricle is normal in size.  The visual ejection fraction is 60-65%.  No regional wall motion abnormalities noted.  There is a large  vegetation or mass on the aortic valve.  Mobile 1cm mass associated with the left coronary leaflet of the aortic valve  HIGHLY SUSPICIOUS for infective endocarditis with associated aortic  regurgitation.  There is moderately severe (3+) aortic regurgitation.  There is a small vegetation or mass on the mitral valve.  0.5 cm posterior leaflet homogenous density  There is mild to moderate (1-2+) mitral regurgitation.     Compared to the TTE from 4/26/2022 the aortic regurgitation is new and along  with the visible mass on the aortic and to a lesser extent the mitral valve is  HIGHLY SUGGESTIVE OF INFECTIVE ENDOCARDITIS with damage to the function of the  aortic valve.     Dr. Kaylyn Martinez messaged at 4:05pm of results with response  confirmed.  ______________________________________________________________________________  LOKESH  I determined this patient to be an appropriate candidate for the planned  sedation and procedure and have reassessed the patient immediately prior to  sedation and procedure. Total sedation time: 24 minutes of continuous bedside  1:1 monitoring. Versed (3mg) was given intravenously. Fentanyl (75mcg) was  given intravenously. The transesophageal probe was passed without difficulty.     Left Ventricle  The left ventricle is normal in size. The visual ejection fraction is 60-65%.  No regional wall motion abnormalities noted.     Right Ventricle  The right ventricle is normal in size and function.     Atria  The left atrium is moderately dilated. The right atrium is moderately dilated.  A contrast injection (Bubble Study) was performed that was negative for flow  across the interatrial septum. No thrombus is detected in the left atrial  appendage.     Mitral Valve  There is mild mitral annular calcification. The mitral valve leaflets appear  thickened, but open well. There is a small vegetation or mass on the mitral  valve. 0.5 cm posterior leaflet homogenous density. There is mild to  moderate  (1-2+) mitral regurgitation.     Tricuspid Valve  The tricuspid valve is normal in structure and function. There is trace  tricuspid regurgitation.     Aortic Valve  There is a large vegetation or mass on the aortic valve. Mobile 1cm mass  associated with the left coronary leaflet of the aortic valve HIGHLY  SUSPICIOUS for infective endocarditis with associated aortic regurgitation.  There is moderately severe (3+) aortic regurgitation.     Pulmonic Valve  The pulmonic valve is normal in structure and function.     Vessels  The aortic root is normal size.     Pericardial/Pleural  Trivial pericardial effusion.     Rhythm  The rhythm was sinus tachycardia.  ______________________________________________________________________________  Report approved by: Justin Tijerina 2022 04:21 PM     ______________________________________________________________________________      Echocardiogram Complete   Result Value    LVEF  65-70%    Narrative    [id number]  [id number]  [id number]  [id number]^DANE^KAYLEE     [facility]  Echocardiography Laboratory  [address] [place] [address]     Name: RAGHAVENDRA MILLAN  MRN: [id number]  : 1935  Study Date: 2022 12:28 PM  Age: 87 yrs  Gender: Female  Patient Location: [facility]  Reason For Study: Endocarditis  Ordering Physician: KAYLEE VELA  Referring Physician: Armida Betancourt  Performed By: Candida Morse     BSA: 1.7 m2  Height: 67 in  Weight: 130 lb  HR: 83  BP: 90/46 mmHg  ______________________________________________________________________________  Procedure  Complete Portable Echo Adult.  ______________________________________________________________________________  Interpretation Summary     The left ventricle is normal in size.  The visual ejection fraction is 65-70%.  Diastolic Doppler findings (E/E' ratio and/or other parameters) suggest left  ventricular filling pressures are indeterminate.  No regional wall motion abnormalities  noted.  The right ventricle is normal in size and function.  Moderate to severe valvular aortic stenosis.  There is moderate mitral annular calcification.  There is mild (1+) mitral regurgitation.     Calcific shadowing makes assessment for endocarditis extremely limited. If  clinical concern, consider LOKESH.  ______________________________________________________________________________  Left Ventricle  The left ventricle is normal in size. There is normal left ventricular wall  thickness. The visual ejection fraction is 65-70%. Diastolic Doppler findings  (E/E' ratio and/or other parameters) suggest left ventricular filling  pressures are indeterminate. No regional wall motion abnormalities noted.     Right Ventricle  The right ventricle is normal in size and function.     Atria  The left atrium is mildly dilated. The right atrium is mildly dilated. There  is no color Doppler evidence of an atrial shunt.     Mitral Valve  The mitral valve leaflets are moderately thickened. There is moderate mitral  annular calcification. There is mild (1+) mitral regurgitation. The mean  mitral valve gradient is 3.3 mmHg.     Tricuspid Valve  There is mild (1+) tricuspid regurgitation. The right ventricular systolic  pressure is approximated at 48.7 mmHg plus the right atrial pressure. IVC  diameter and respiratory changes fall into an intermediate range suggesting an  RA pressure of 8 mmHg. Right ventricular systolic pressure is elevated,  consistent with moderate to severe pulmonary hypertension.     Aortic Valve  No aortic regurgitation is present. The mean AoV pressure gradient is 33.7  mmHg. Moderate to severe valvular aortic stenosis.     Pulmonic Valve  There is trace pulmonic valvular regurgitation.     Vessels  Normal size aorta. The aortic root is normal size.     Pericardium  There is no pericardial effusion.     Rhythm  Sinus rhythm was  noted.  ______________________________________________________________________________  MMode/2D Measurements & Calculations  IVSd: 0.94 cm     LVIDd: 4.0 cm  LVIDs: 2.4 cm  LVPWd: 1.0 cm  FS: 38.9 %  LV mass(C)d: 123.2 grams  LV mass(C)dI: 73.1 grams/m2  Ao root diam: 3.2 cm  LA dimension: 2.9 cm  asc Aorta Diam: 3.1 cm  LA/Ao: 0.90  LVOT diam: 2.1 cm  LVOT area: 3.4 cm2  LA Volume (BP): 54.0 ml  LA Volume Index (BP): 32.1 ml/m2  RWT: 0.51     Doppler Measurements & Calculations  MV E max tolu: 93.6 cm/sec  MV A max tolu: 88.4 cm/sec  MV E/A: 1.1  MV max P.4 mmHg  MV mean PG: 3.3 mmHg  MV V2 VTI: 32.0 cm  MVA(VTI): 2.3 cm2  MV dec time: 0.15 sec  Ao V2 max: 355.6 cm/sec  Ao max P.0 mmHg  Ao V2 mean: 278.2 cm/sec  Ao mean P.7 mmHg  Ao V2 VTI: 82.3 cm  LIBERTAD(I,D): 0.88 cm2  LIBERTAD(V,D): 0.94 cm2  LV V1 max PG: 3.9 mmHg  LV V1 max: 98.2 cm/sec  LV V1 VTI: 21.2 cm  SV(LVOT): 72.4 ml  SI(LVOT): 43.0 ml/m2  PA acc time: 0.16 sec  TR max tolu: 332.9 cm/sec  TR max P.7 mmHg  AV Tolu Ratio (DI): 0.28  LIBERTAD Index (cm2/m2): 0.52  E/E' av.7  Lateral E/e': 12.6  Medial E/e': 10.9     ______________________________________________________________________________  Report approved by: Justin Tijerina 2022 02:01 PM

## 2022-05-01 NOTE — PROGRESS NOTES
Paged Hospitalsit Dr Alejandro regarding diet and if pt appropriate to transfer. Per MD will do regular diet and plan to transfer

## 2022-05-01 NOTE — PROGRESS NOTES
[facility]    Medicine Progress Note - Hospitalist Service    Date of Admission:  4/24/2022    Assessment & Plan   Tamar Rose is a 87 year old female admitted on 4/24/2022.  Past history of colon cancer, hyperlipidemia, osteoporosis, microscopic colitis, and iron deficiency anemia who presented to the ED with 3 days of nausea, diarrhea, and progressive right shoulder pain.  Arthrocentesis in ED concerning for septic joint.  ID consulted and started on antibiotic treatment.  Has had persistent bacteremia so far.  Status post I&D x2, last I&D on 4/30/2022.  Postop complication with new onset A. fib.  Cardiology consulted and started on amiodarone drip now transition to amiodarone p.o.  LOKESH has revealed aortic and mitral vegetations.  Cardiac surgery consulted and recommend valve replacement, however there is concern this is high risk due to patient's frail status.  Family meeting held 5/1/2022, and at this time they are still undecided about surgery but leaning more towards not getting it.  We will consult palliative care to assist with goals of care.        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  * admission leukocytosis of 22 with lactate of 2.4;   * arthrocentesis 4/24 with turbid fluid with Staph aureus on culture; also noted calcium pyrophosphate crystals  * admission blood cultures positive for MSSA  * persistent bacteremia; repeat cultures daily  * TTE withouth vegetations  * Repeat I&D 4/30/22  * LOKESH with aortic and mitral vegetations seen  * CT surgery consulted: input appreciated. Care conference had on 05/01to discuss urgent valve replacement; it appears they remain undecided due to patient's frail status but are leaning more towards not getting it.  * ID  following    Plan:  -- cont post op management per ortho  -- continue abx - ancef per ID recs  -- continue to follow daily blood culture results   -- follow up intra-op cultures.  -- cont pain control  --Consult palliative care.        Possible pneumonia  Admission CXR demonstrating right basilar infiltrates, CT abd/pelvis with groundglass opacities of right base.  Treated for CAP with 7-day course of Ceftin starting 4/1/22, with resolution of productive cough.  Unclear if admission imaging reflects resolved PNA vs new MSSA PNA.   - currently on room air  - Ancef as above    Atrial fibrillation  --New onset developed 4/30/2022 post -op   -- Cardiology consulted input appreciated.  --Initially on amiodarone drip, now transition to amiodarone p.o.  --Patient now back to normal sinus rhythm.  --Continue to monitor on telemetry.  --Contraindication to anticoagulation at this time.     Acute blood loss anemia  - hgb 9.1 this am  - continue to follow CBC     Urinary retention  Noted to be retaining urine with nearly 1000 ml out on straight cath.    * very difficult catheterization requiring 8 Fr cath following admission  - unfortunately, carpenter was removed in PACU 4/25  - per Urology, follow PVR and straight cath with 8Fr if >500     Right upper pole kidney hypoattenuation-concern for infarction  No back or flank pain reported.  UA not congruent with infection.  Finding was noted on CT incidentally.  Renal US 4/24 wnl; lesion on CT not appreciated sonographically.     Nausea, vomiting, and diarrhea  Correlating with progressive worsening of right shoulder, possible gastritis on CT.  No bloody or black BM reported.  - mild nausea today, denies diarrhea     Hyponatremia, resolved  Admission Na 132, suspect hypovolemic.  Normalized with IVF.      Chronic conditions  Osteoporosis  FRANCESCO  Hyperlipidemia: continue PTA statin  History of colon cancer s/p right hemicolectomy 8/2017; in remission            Diet: Regular Diet Adult     DVT Prophylaxis: Pneumatic Compression Devices  Coley Catheter: PRESENT, indication: Strict 1-2 Hour I&O  Central Lines: None  Cardiac Monitoring: None  Code Status: Full Code      Disposition Plan   Expected Discharge: 05/01/2022     Anticipated discharge location:  Awaiting care coordination huddle  Delays:          The patient's care was discussed with the Patient.    Kaylyn Chase MD  Hospitalist Service  [facility]  Securely message with the Vocera Web Console (learn more here)  Text page via [employer] Paging/Directory         Clinically Significant Risk Factors Present on Admission                   ______________________________________________________________________    Interval History   Patient now transition back to normal sinus rhythm.  Complains of a sore shoulder.    Data reviewed today: I reviewed all medications, new labs and imaging results over the last 24 hours. I personally reviewed no images or EKG's today.    Physical Exam   Vital Signs: Temp: 98.1  F (36.7  C) Temp src: Axillary BP: 96/59 Pulse: 95   Resp: 17 SpO2: 95 % O2 Device: Simple face mask Oxygen Delivery: 2 LPM  Weight: 134 lbs 7.69 oz  General Appearance: Well appearing for stated age.  Respiratory: CTAB, no rales or ronchi  Cardiovascular: S1, S2 normal, no murmurs  GI: non-tender on palpation, BS present      Data   Recent Labs   Lab 05/01/22  1028 05/01/22  0836 05/01/22  0418 04/30/22  1726 04/29/22  1839 04/28/22  0733 04/27/22  0555 04/26/22  0831 04/25/22  0754   WBC 18.1*  --   --  21.5* 19.3*  --    < > 17.2* 15.6*   HGB 9.1*  --   --  9.0* 10.8*  --    < > 9.5* 10.6*   MCV 92  --   --  95 94  --    < > 91 97     --   --  257 298  --    < > 239 244   NA  --   --  140 138  --   --   --  136 136   POTASSIUM  --   --  4.1 3.7  --   --   --  4.0 4.2   CHLORIDE  --   --  111* 108  --   --   --  109 110*   CO2  --   --  21 20  --   --   --  21 21   BUN  --   --  11 10  --   --   --  15 14   CR  --   --   0.64 0.54  --  0.78  --  0.81 0.88   ANIONGAP  --   --  8 10  --   --   --  6 5   MYRIAM  --   --  6.8* 6.9*  --   --   --  8.0* 7.7*   GLC  --  96 98 110*  --   --    < > 100* 110*   ALBUMIN  --   --  1.4*  --   --   --   --   --  1.9*   PROTTOTAL  --   --  4.5*  --   --   --   --   --  5.4*   BILITOTAL  --   --  0.2  --   --   --   --   --  0.6   ALKPHOS  --   --  125  --   --   --   --   --  88   ALT  --   --  30  --   --   --   --   --  38   AST  --   --  39  --   --   --   --   --  37    < > = values in this interval not displayed.

## 2022-05-01 NOTE — PROGRESS NOTES
"CLINICAL NUTRITION SERVICES  -  ASSESSMENT NOTE      Future/Additional Recommendations: Ensure BID once diet advanced    Malnutrition: % Weight Loss:  None noted  % Intake:  </= 50% for >/= 5 days (severe malnutrition)  Subcutaneous Fat Loss:  Unable to observe  Muscle Loss:  Unable to observe   Fluid Retention:  Mild as above     Malnutrition Diagnosis: Moderate malnutrition  In Context of:  Acute illness or injury        REASON FOR ASSESSMENT  Tamar Rose is a 87 year old female seen by Registered Dietitian for LOS    Patient admitted with right septic shoulder d/t MSSA, right sided pneumonia, and pseudogout - now noted to have endocarditis       NUTRITION HISTORY  - Unable to obtain nutrition history as patient not available.   - Noted plans for family meeting today with [facility] to determine plan.       CURRENT NUTRITION ORDERS  Diet Order:     NPO     Current Intake/Tolerance:  N/A  Noted patient has been eating poorly since admit (25% of meals per flowsheet)  Patient has been ordering very small meals   Last meal ordered was on 4/29:  Dinner - Deli sandwich  Breakfast - Corn Flakes and coffee       NUTRITION FOCUSED PHYSICAL ASSESSMENT FOR DIAGNOSING MALNUTRITION)  No:  Patient not available                Observed:    Unable     Obtained from Chart/Interdisciplinary Team:  Edema - mild (2+) - generalized     ANTHROPOMETRICS  Height: 5' 7\"  Weight: 59.1 kg (130#)(4/24)  Body mass index is 20.4 kg/m   Weight Status:  Normal BMI  IBW: 61.4 kg   % IBW: 96%  Weight History:   Wt Readings from Last 10 Encounters:   05/01/22 61 kg (134 lb 7.7 oz)   04/22/22 58.1 kg (128 lb)   06/07/10 63.1 kg (139 lb 3.2 oz)         LABS  Labs reviewed    MEDICATIONS  Medications reviewed      ASSESSED NUTRITION NEEDS PER APPROVED PRACTICE GUIDELINES:    Dosing Weight 59.1 kg   Estimated Energy Needs: 5697-9956 kcals (25-30 Kcal/Kg)  Justification: maintenance  Estimated Protein Needs: 70-90 grams protein (1.2-1.5 g " pro/Kg)  Justification: post-op + hypercatabolism with acute illness  Estimated Fluid Needs: 9648-2822 mL (1 mL/Kcal)  Justification: maintenance    MALNUTRITION:  % Weight Loss:  None noted  % Intake:  </= 50% for >/= 5 days (severe malnutrition)  Subcutaneous Fat Loss:  Unable to observe  Muscle Loss:  Unable to observe   Fluid Retention:  Mild as above     Malnutrition Diagnosis: Moderate malnutrition  In Context of:  Acute illness or injury    NUTRITION DIAGNOSIS:  Inadequate oral intake related to presumed poor appetite as evidenced by poor intake (25%) per flowsheets       NUTRITION INTERVENTIONS  Recommendations / Nutrition Prescription  ADAT per MD discretion   Ensure BID once diet advanced     Implementation  Nutrition education: Not appropriate at this time due to patient condition    Nutrition Goals  Diet to advance within 24 hours and intake to increase to 50% meals and supplements     MONITORING AND EVALUATION:  Progress towards goals will be monitored and evaluated per protocol and Practice Guidelines    Amara Bateman RD, LD, CNSC   Clinical Dietitian - [facility]

## 2022-05-01 NOTE — PROGRESS NOTES
No significant changes this shift. VSS on 2 LNC. Amio gtt stopped per Cards. Tolerating regular diet, carpenter good output. BM x2. Care conference done. Family by bedside, supportive. Will cont to monitor.

## 2022-05-01 NOTE — CONSULTS
Cardiology Consultation:    Tamar Rose MRN#: [id number]   YOB: 1935 Age: 87 year old     Date of Admission: 4/24/2022  Consult indication: atrial fibrillation, chest pain         Assessment and Plan / Recommendations:      # Chest pain, likely due to atrial fibrillation with RVR (heart rate as high as 190 bpm).  Currently chest pain-free.  Given large mobile vegetation on aortic valve and also vegetation on mitral valve, considered embolization down to coronary artery, however ECG did not demonstrate any significant ischemic changes (when heart rate was 100 bpm).  Troponin only mildly elevated.  CT PE study negative for PE, did demonstrate small to moderate bilateral pleural effusions.  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  Pulse pressure stable from this morning, patient not hypoxic.  # Septic arthritis s/p I&D 4/30/2022  # Mildly elevated troponin, likely type II MI secondary to above.      - Will change diltiazem gtt. to amiodarone gtt. given intermittent hypotension  - Agree with aspirin  - Generally would recommend anticoagulation, however risks likely outweigh benefits  - Continue cardiac telemetry, daily ECGs  - Trend troponin until peak  - TTE pending  - Patient and daughter asked about general risks/benefits of operative management of endocarditis and valvular disease.  Fortunately, they are already planning on having a large family meeting with Dr. Del Angel tomorrow.  Advised that in order to mitigate miscommunication, it would best to discuss with surgery directly, however per the consult note from his team earlier today, operative management would be high risk, given patient's advanced age and significant co-morbidities.  Palliative care/Hospice would also be an option, they voiced understanding and will wait until other family are present tomorrow for  further discussion.   - Cardiology will follow    Thank you for allowing our team to participate in the care of Tamar Rose. Please do not hesitate to page me with any questions or concerns.    Jaycob Gordon MD, [facility]  Cardiology  Text Page   April 30, 2022    Voice recognition software utilized.     I spent a total of 45 minutes (excluding procedure time) personally providing and directing critical care services at the bedside and on the unit for this patient.            History of Present Illness:     I had the opportunity to see patient Tamar Rose at [facility] ICU for a cardiology consultation.     As you know, patient is a pleasant 87-year-old female with no significant prior cardiac history who was admitted on 4/24/2022 with septic arthritis due to MSSA bacteremia.  Patient had pneumonia several weeks ago, for which she was on antibiotics and had recovered.  Subsequently patient underwent a steroid injection to the shoulder 4/19/2022, subsequently developed nausea, diarrhea, progressive shoulder pain.  She was found to have MSSA bacteremia, and aspiration of the shoulder demonstrated septic arthritis.  Subsequently, patient underwent a TTE on 4/26/2022, demonstrating borderline hyperdynamic LV function, moderate to severe aortic stenosis, moderate mitral annular calcification with mild MR.  Follow-up LOKESH 4/29/2022 demonstrated LVEF 60 to 65%, large echodensity on the aortic valve, 1 cm, mobile, with associated moderately severe aortic insufficiency.  There is also a small echodensity on the mitral valve posterior leaflet with associated mild to moderate mitral regurgitation, findings overall were highly concerning for vegetation with valvular destruction.    CT surgery was consulted earlier today, family meeting planned for tomorrow.    Patient underwent repeat I&D of the right shoulder earlier this afternoon.  Following the operation, patient went into atrial fibrillation with RVR.   Reviewed anesthesiology note, patient developed chest pain while in RVR, pain improved with diltiazem and slowing of heart rate.  A rapid response was called due to continued chest discomfort.  Initial ECG from earlier today demonstrates atrial fibrillation with RVR at 190 bpm, diffuse ST segment depression most severe in lead V3.  Subsequent ECG at 16:16 demonstrated atrial fibrillation at 100 bpm, no acute ischemic changes.    Troponin approx 1.5 hours after chest pain onset was mildly elevated at 147 (high-sensitivity assay).    Heart rate currently 110 to 120 bpm range, patient is chest pain-free.  Daughter is at the bedside.  Patient has no specific concerns except for some pain in the right shoulder which is reasonably well controlled.         Past Medical History:   I have reviewed this patient's past medical history  Acute pain of right shoulder  Colon cancer  Leg length discrepancy  Iron deficiency anemia  Osteoporosis  Hyperlipidemia  Bowel incontinence  Gingivitis  Cataracts  Torticollis  Microscopic colitis           Past Surgical History:   I have reviewed this patient's past surgical history   Past Surgical History:   Procedure Laterality Date     ARTHROSCOPY SHOULDER, OPEN DEBRIDEMENT, COMBINED Right 4/25/2022    Procedure: IRRIGATION AND DEBRIDEMENT, RIGHT NATIVE SHOULDER;  Surgeon: Baljinder Cline MD;  Location: [facility] OR     HYSTERECTOMY, MATT               Social History:   I have reviewed this patient's social history  Social History     Tobacco Use     Smoking status: Never Smoker     Smokeless tobacco: Not on file   Substance Use Topics     Alcohol use: Yes     Comment: occ             Family History:   I have reviewed this patient's family history  Family History   Problem Relation Age of Onset     Cancer Maternal Grandmother              Allergies:   I have reviewed this patient's allergy history  Allergies   Allergen Reactions     Sulfa Drugs Rash             Medications reviewed:   Prior to  admission medications:  Prior to Admission medications    Medication Sig Start Date End Date Taking? Authorizing Provider   acetaminophen (TYLENOL) 325 MG tablet Take 2 tablets (650 mg) by mouth every 6 hours as needed for mild pain or other (and adjunct with moderate or severe pain or per patient request) 4/26/22  Yes Serena Ashley PA-C   aspirin (ASA) 81 MG EC tablet Take 1 tablet (81 mg) by mouth 2 times daily 4/26/22  Yes Serena Ashley PA-C   ibuprofen (ADVIL/MOTRIN) 200 MG tablet Take 200 mg by mouth every 4 hours as needed for mild pain   Yes Unknown, Entered By History   methylPREDNISolone (MEDROL DOSEPAK) 4 MG tablet therapy pack Take by mouth See Admin Instructions Follow Package Directions   Yes Unknown, Entered By History   oxyCODONE-acetaminophen (PERCOCET) 5-325 MG tablet Take 1 tablet by mouth every 6 hours as needed for moderate to severe pain 4/26/22  Yes Serena Ashley PA-C   rosuvastatin (CRESTOR) 20 MG tablet Take 20 mg by mouth daily 12/13/21  Yes Reported, Patient   senna-docusate (SENOKOT-S/PERICOLACE) 8.6-50 MG tablet Take 1 tablet by mouth 2 times daily as needed for constipation 4/26/22  Yes Serena Ashley PA-C      Current medications:  Current Facility-Administered Medications Ordered in Epic   Medication Dose Route Frequency Last Rate Last Admin     acetaminophen (TYLENOL) tablet 650 mg  650 mg Oral Q6H PRN   650 mg at 04/28/22 1434    Or     acetaminophen (TYLENOL) Suppository 650 mg  650 mg Rectal Q6H PRN         amiodarone (NEXTERONE) bolus 150 mg  150 mg Intravenous Once         amiodarone in NS adult drip std conc 1.8 mg/mL infusion  1 mg/min Intravenous Continuous         [START ON 5/1/2022] amiodarone in NS adult drip std conc 1.8 mg/mL infusion  0.5 mg/min Intravenous Continuous         [START ON 5/1/2022] aspirin EC tablet 81 mg  81 mg Oral BID         benzocaine-menthol (CHLORASEPTIC) 6-10 MG lozenge 1 lozenge  1 lozenge Buccal Q1H PRN         bisacodyl  (DULCOLAX) Suppository 10 mg  10 mg Rectal Daily PRN         calcium carbonate (TUMS) chewable tablet 500 mg  500 mg Oral TID PRN   500 mg at 04/27/22 2155     ceFAZolin (ANCEF) intermittent infusion 2 g in 100 mL dextrose PRE-MIX  2 g Intravenous Q8H 200 mL/hr at 04/30/22 1157 2 g at 04/30/22 1157     HYDROmorphone (DILAUDID) injection 0.2-0.4 mg  0.2-0.4 mg Intravenous Q2H PRN   0.2 mg at 04/30/22 1909     lidocaine (LMX4) cream   Topical Q1H PRN         lidocaine 1 % 0.1-1 mL  0.1-1 mL Other Q1H PRN         magnesium hydroxide (MILK OF MAGNESIA) suspension 30 mL  30 mL Oral Daily PRN         melatonin tablet 1 mg  1 mg Oral At Bedtime PRN   1 mg at 04/26/22 2101     metoprolol (LOPRESSOR) injection 5 mg  5 mg Intravenous Q4H         metoprolol (LOPRESSOR) injection 5 mg  5 mg Intravenous Q4H PRN         naloxone (NARCAN) injection 0.2 mg  0.2 mg Intravenous Q2 Min PRN        Or     naloxone (NARCAN) injection 0.4 mg  0.4 mg Intravenous Q2 Min PRN        Or     naloxone (NARCAN) injection 0.2 mg  0.2 mg Intramuscular Q2 Min PRN        Or     naloxone (NARCAN) injection 0.4 mg  0.4 mg Intramuscular Q2 Min PRN         nitroGLYcerin (NITROSTAT) sublingual tablet 0.4 mg  0.4 mg Sublingual Q5 Min PRN         ondansetron (ZOFRAN-ODT) ODT tab 4 mg  4 mg Oral Q6H PRN   4 mg at 04/26/22 2102    Or     ondansetron (ZOFRAN) injection 4 mg  4 mg Intravenous Q6H PRN         oxyCODONE (ROXICODONE) tablet 5 mg  5 mg Oral Q6H PRN   5 mg at 04/28/22 1435     polyethylene glycol (MIRALAX) Packet 17 g  17 g Oral Daily   17 g at 04/27/22 0819     polyethylene glycol (MIRALAX) Packet 17 g  17 g Oral Daily PRN         prochlorperazine (COMPAZINE) injection 5 mg  5 mg Intravenous Q6H PRN        Or     prochlorperazine (COMPAZINE) tablet 5 mg  5 mg Oral Q6H PRN   5 mg at 04/26/22 0058     rosuvastatin (CRESTOR) tablet 20 mg  20 mg Oral Daily   20 mg at 04/29/22 0824     senna-docusate (SENOKOT-S/PERICOLACE) 8.6-50 MG per tablet 1 tablet   1 tablet Oral BID   1 tablet at 22 2156    Or     senna-docusate (SENOKOT-S/PERICOLACE) 8.6-50 MG per tablet 2 tablet  2 tablet Oral BID   2 tablet at 22 0840     sodium chloride (PF) 0.9% PF flush 3 mL  3 mL Intracatheter Q8H   3 mL at 22 0410     sodium chloride (PF) 0.9% PF flush 3 mL  3 mL Intracatheter q1 min prn   3 mL at 22 1048     sodium chloride 0.9% infusion   Intravenous Continuous 100 mL/hr at 22 1639 New Bag at 22 1639     Current Outpatient Medications Ordered in Epic   Medication     acetaminophen (TYLENOL) 325 MG tablet     aspirin (ASA) 81 MG EC tablet     oxyCODONE-acetaminophen (PERCOCET) 5-325 MG tablet     senna-docusate (SENOKOT-S/PERICOLACE) 8.6-50 MG tablet             Review of Systems:   A complete review of systems was performed and was negative except as mentioned in the HPI.          Physical Exam:   Vital signs were personally reviewed:  Temperatures:  Current - Temp: 98.6  F (37  C); Max - Temp  Av.8  F (34.9  C)  Min: 33.8  F (1  C)  Max: 98.7  F (37.1  C)  Respiration range: Resp  Av.4  Min: 12  Max: 34  Pulse range: Pulse  Av.2  Min: 80  Max: 203  Blood pressure range: Systolic (24hrs), Av , Min:111 , Max:143   ; Diastolic (24hrs), Av, Min:45, Max:72    Pulse oximetry range: SpO2  Av.6 %  Min: 90 %  Max: 100 %    Intake/Output Summary (Last 24 hours) at 2022  Last data filed at 2022 1800  Gross per 24 hour   Intake 3000 ml   Output 2655 ml   Net 345 ml     136 lbs 7.44 oz  Body mass index is 21.37 kg/m .   Body surface area is 1.71 meters squared.    Constitutional: appears stated age, in no acute distress, eating ice cream  Head: normocephalic, atraumatic  Neck: supple, trachea midline  Pulmonary: diminished breath sounds on limited anterior exam  Cardiovascular: tachycardic, irregularly irregular, 2/6 JOSEF at the RUSB  Gastrointestinal: no guarding, non-rigid   Neurologic: awake, alert, moves all  extremities  Skin: no jaundice, Right shoulder surgical dressing C/D/I  Psychiatric: affect is normal, answers questions appropriately, oriented to self and place         Laboratory tests:   Laboratory tests personally reviewed:   CMP  Recent Labs   Lab 04/30/22  1726 04/28/22  0733 04/27/22  0555 04/26/22  0831 04/25/22  0754 04/24/22  1323 04/24/22  1322     --   --  136 136  --  132*   POTASSIUM 3.7  --   --  4.0 4.2  --  4.2   CHLORIDE 108  --   --  109 110*  --  107   CO2 20  --   --  21 21  --  20   ANIONGAP 10  --   --  6 5  --  5   *  --  107* 100* 110*  --  148*   BUN 10  --   --  15 14  --  18   CR 0.54 0.78  --  0.81 0.88   < > 0.82   GFRESTIMATED 89 74  --  70 64   < > 69   MYRIAM 6.9*  --   --  8.0* 7.7*  --  8.2*   MAG 1.8  --   --   --   --   --   --    PROTTOTAL  --   --   --   --  5.4*  --  6.2*   ALBUMIN  --   --   --   --  1.9*  --  2.2*   BILITOTAL  --   --   --   --  0.6  --  0.5   ALKPHOS  --   --   --   --  88  --  67   AST  --   --   --   --  37  --  26   ALT  --   --   --   --  38  --  33    < > = values in this interval not displayed.     CBC  Recent Labs   Lab 04/30/22 1726 04/29/22  1839 04/27/22  0722 04/26/22  0831   WBC 21.5* 19.3* 18.2* 17.2*   RBC 2.93* 3.54* 3.29* 3.12*   HGB 9.0* 10.8* 10.0* 9.5*   HCT 27.7* 33.2* 30.7* 28.3*   MCV 95 94 93 91   MCH 30.7 30.5 30.4 30.4   MCHC 32.5 32.5 32.6 33.6   RDW 15.4* 15.1* 15.5* 15.6*    298 246 239     INRNo lab results found in last 7 days.  No results found for: TROPI, TROPONIN, TROPR, TROPN  No results for input(s): CHOL, HDL, LDL, TRIG, CHOLHDLRATIO in the last 98538 hours.  No results found for: A1C  No results found for: TSH         Imaging and Additional Data:   Additional data personally reviewed:  Recent Results (from the past 4320 hour(s))   Echocardiogram LOKESH   Result Value    LVEF  60-65%    Narrative    [id number]  [id number]  [id number]^CAITLIN^[facility]  Echocardiography  Laboratory  [address] [place] [id number]     Name: RAGHAVENDRA MILLAN  MRN: [id number]  : 1935  Study Date: 2022 03:09 PM  Age: 87 yrs  Gender: Female  Patient Location: [facility]  Reason For Study: Endocarditis  Ordering Physician: KAYLYN TUCKER  Referring Physician: Armida Betancourt  Performed By: Miguel Angel Zhang     BSA: 1.7 m2  Height: 67 in  Weight: 132 lb  HR: 101  BP: 109/53 mmHg  ______________________________________________________________________________  Procedure  Complete LOKESH Adult. LOKESH Probe serial #[id number] was used during the procedure.  The heart rate, respiratory rate and response to care were monitored  throughout the procedure with the assistance of the nurse.  ______________________________________________________________________________  Interpretation Summary     The left ventricle is normal in size.  The visual ejection fraction is 60-65%.  No regional wall motion abnormalities noted.  There is a large vegetation or mass on the aortic valve.  Mobile 1cm mass associated with the left coronary leaflet of the aortic valve  HIGHLY SUSPICIOUS for infective endocarditis with associated aortic  regurgitation.  There is moderately severe (3+) aortic regurgitation.  There is a small vegetation or mass on the mitral valve.  0.5 cm posterior leaflet homogenous density  There is mild to moderate (1-2+) mitral regurgitation.     Compared to the TTE from 2022 the aortic regurgitation is new and along  with the visible mass on the aortic and to a lesser extent the mitral valve is  HIGHLY SUGGESTIVE OF INFECTIVE ENDOCARDITIS with damage to the function of the  aortic valve.     Dr. Kaylyn Martinez messaged at 4:05pm of results with response  confirmed.  ______________________________________________________________________________  LOKESH  I determined this patient to be an appropriate candidate for the planned  sedation and procedure and have reassessed the patient  immediately prior to  sedation and procedure. Total sedation time: 24 minutes of continuous bedside  1:1 monitoring. Versed (3mg) was given intravenously. Fentanyl (75mcg) was  given intravenously. The transesophageal probe was passed without difficulty.     Left Ventricle  The left ventricle is normal in size. The visual ejection fraction is 60-65%.  No regional wall motion abnormalities noted.     Right Ventricle  The right ventricle is normal in size and function.     Atria  The left atrium is moderately dilated. The right atrium is moderately dilated.  A contrast injection (Bubble Study) was performed that was negative for flow  across the interatrial septum. No thrombus is detected in the left atrial  appendage.     Mitral Valve  There is mild mitral annular calcification. The mitral valve leaflets appear  thickened, but open well. There is a small vegetation or mass on the mitral  valve. 0.5 cm posterior leaflet homogenous density. There is mild to moderate  (1-2+) mitral regurgitation.     Tricuspid Valve  The tricuspid valve is normal in structure and function. There is trace  tricuspid regurgitation.     Aortic Valve  There is a large vegetation or mass on the aortic valve. Mobile 1cm mass  associated with the left coronary leaflet of the aortic valve HIGHLY  SUSPICIOUS for infective endocarditis with associated aortic regurgitation.  There is moderately severe (3+) aortic regurgitation.     Pulmonic Valve  The pulmonic valve is normal in structure and function.     Vessels  The aortic root is normal size.     Pericardial/Pleural  Trivial pericardial effusion.     Rhythm  The rhythm was sinus tachycardia.  ______________________________________________________________________________  Report approved by: Justin Tijerina 04/29/2022 04:21 PM     ______________________________________________________________________________      Echocardiogram Complete   Result Value    LVEF  65-70%    Narrative     [id number]  [id number]  [id number]  [id number]^DANE^KAYLEE     [facility]  Echocardiography Laboratory  [address] [facility] [place] [address]     Name: RAGHAVENDRA MILLAN  MRN: [id number]  : 1935  Study Date: 2022 12:28 PM  Age: 87 yrs  Gender: Female  Patient Location: [facility]  Reason For Study: Endocarditis  Ordering Physician: KAYLEE VELA  Referring Physician: Armida Betancourt  Performed By: Candida Morse     BSA: 1.7 m2  Height: 67 in  Weight: 130 lb  HR: 83  BP: 90/46 mmHg  ______________________________________________________________________________  Procedure  Complete Portable Echo Adult.  ______________________________________________________________________________  Interpretation Summary     The left ventricle is normal in size.  The visual ejection fraction is 65-70%.  Diastolic Doppler findings (E/E' ratio and/or other parameters) suggest left  ventricular filling pressures are indeterminate.  No regional wall motion abnormalities noted.  The right ventricle is normal in size and function.  Moderate to severe valvular aortic stenosis.  There is moderate mitral annular calcification.  There is mild (1+) mitral regurgitation.     Calcific shadowing makes assessment for endocarditis extremely limited. If  clinical concern, consider LOKESH.  ______________________________________________________________________________  Left Ventricle  The left ventricle is normal in size. There is normal left ventricular wall  thickness. The visual ejection fraction is 65-70%. Diastolic Doppler findings  (E/E' ratio and/or other parameters) suggest left ventricular filling  pressures are indeterminate. No regional wall motion abnormalities noted.     Right Ventricle  The right ventricle is normal in size and function.     Atria  The left atrium is mildly dilated. The right atrium is mildly dilated. There  is no color Doppler evidence of an atrial shunt.     Mitral Valve  The mitral valve leaflets are  moderately thickened. There is moderate mitral  annular calcification. There is mild (1+) mitral regurgitation. The mean  mitral valve gradient is 3.3 mmHg.     Tricuspid Valve  There is mild (1+) tricuspid regurgitation. The right ventricular systolic  pressure is approximated at 48.7 mmHg plus the right atrial pressure. IVC  diameter and respiratory changes fall into an intermediate range suggesting an  RA pressure of 8 mmHg. Right ventricular systolic pressure is elevated,  consistent with moderate to severe pulmonary hypertension.     Aortic Valve  No aortic regurgitation is present. The mean AoV pressure gradient is 33.7  mmHg. Moderate to severe valvular aortic stenosis.     Pulmonic Valve  There is trace pulmonic valvular regurgitation.     Vessels  Normal size aorta. The aortic root is normal size.     Pericardium  There is no pericardial effusion.     Rhythm  Sinus rhythm was noted.  ______________________________________________________________________________  MMode/2D Measurements & Calculations  IVSd: 0.94 cm     LVIDd: 4.0 cm  LVIDs: 2.4 cm  LVPWd: 1.0 cm  FS: 38.9 %  LV mass(C)d: 123.2 grams  LV mass(C)dI: 73.1 grams/m2  Ao root diam: 3.2 cm  LA dimension: 2.9 cm  asc Aorta Diam: 3.1 cm  LA/Ao: 0.90  LVOT diam: 2.1 cm  LVOT area: 3.4 cm2  LA Volume (BP): 54.0 ml  LA Volume Index (BP): 32.1 ml/m2  RWT: 0.51     Doppler Measurements & Calculations  MV E max tolu: 93.6 cm/sec  MV A max tolu: 88.4 cm/sec  MV E/A: 1.1  MV max P.4 mmHg  MV mean PG: 3.3 mmHg  MV V2 VTI: 32.0 cm  MVA(VTI): 2.3 cm2  MV dec time: 0.15 sec  Ao V2 max: 355.6 cm/sec  Ao max P.0 mmHg  Ao V2 mean: 278.2 cm/sec  Ao mean P.7 mmHg  Ao V2 VTI: 82.3 cm  LIBERTAD(I,D): 0.88 cm2  LIBERTAD(V,D): 0.94 cm2  LV V1 max PG: 3.9 mmHg  LV V1 max: 98.2 cm/sec  LV V1 VTI: 21.2 cm  SV(LVOT): 72.4 ml  SI(LVOT): 43.0 ml/m2  PA acc time: 0.16 sec  TR max tolu: 332.9 cm/sec  TR max P.7 mmHg  AV Tolu Ratio (DI): 0.28  LIBERTAD Index (cm2/m2): 0.52  E/E'  av.7  Lateral E/e': 12.6  Medial E/e': 10.9     ______________________________________________________________________________  Report approved by: Justin Tijerina 2022 02:01 PM            Clinically Significant Risk Factors Present on Admission                 Cardiovascular: Endocarditis and heart valve disorders in diseases classified elsewhere  Non-Rheumatic Valve Disease: Aortic valve stenosis with insufficiency  Mitral valve insufficiency    Systemic: Chronic Fatigue and Other Debilities: Age-related physical debility

## 2022-05-01 NOTE — PLAN OF CARE
Shift Summary    Converting to sinus rhythm at 2345 last evening. Vital signs remaining within parameters. Arterial line becoming occluded and removed this morning. Only complaining of pain with movement. Dressing over right shoulder remains CDI. Urine output adequate. Resting between cares.

## 2022-05-01 NOTE — PROGRESS NOTES
Addendum:  Patient went to OR for repeat I&D today. Posty -op, spoke with anesthesiologist as patient developed new -onset Afib with RVR and complained of chest discomfort. Discussed with RRT team to evaluate ( please see House NP,s (Peace Lock's CNP) note for details.  Patient will now be transferred to the ICU post op instead of CCU for closer monitoring. Case discussed with intensivist on call who has graciously agreed to assist in management of patient - consult placed.     Kaylyn Poole MD  Hospitalist

## 2022-05-01 NOTE — PROGRESS NOTES
[facility]    Infectious Disease Progress Note    Date of Service : 05/01/2022       Assessment:  87YF who is admitted with acute onset right shoulder pain/septic arthritis following steroid injection with high grade S.aureus MSSA sepsis with endocarditis of the aortic and mitral valves . She  had pneumonia about 2 weeks prior which may also be the source of infection.      -Aortic valve endocarditis with 1cm mass on the left coronary leaflet with aortic regurgitation, possible small vegetation on the mitral valve  -Chest pain and atrial fibrillation with RVR likely related to complications of endocarditis   -High grade MSSA bacteremia  -Right shoulder septic arthritis with hx of steroid injection prior to onset of symptoms. S/p arthroscopic debridement with uncontrolled infection  -Ongoing leukocytosis and CRP elevation  -Right renal infarct  -Recent pneumonia treated with oral antibiotics. CT shows bilateral ground glass opacities. Resolving pneumonia vs staph pneumonia as source for sepsis  -Systolic murmur  -Pseudogout with positive synovial fluid crystals for calcium pyrophosphate  -chronic medical conditions - Hx of colon cancer, osteoporosis, microscopic colitis, hyperlipidemia     Recommendations  1. Being followed by cardiology and cardiac surgery -undecided about surgery  2. Continue Cefazolin  3. Monitor for complications of endocarditis , embolizatioin, CVA, cardiac arrythmias    Enedina Nathan MD, MD    Interval History   Resting, family at bedside. Overnight events noted. Developed atrial fibrillation with RVR and is being monitored in the ICU. Afebrile now, persistent leukocytosis. Shoulder pain is controlled.    Physical Exam   Temp: 97.9  F (36.6  C) Temp src: Axillary BP: 105/43 Pulse: 71   Resp: 16 SpO2: 98 % O2 Device: Simple face mask Oxygen Delivery: 2 LPM  Vitals:    04/28/22 0620 04/29/22 0700 05/01/22 0600   Weight: 59.9 kg (132 lb 0.9 oz) 61.9 kg (136 lb 7.4 oz) 61 kg  (134 lb 7.7 oz)     Vital Signs with Ranges  Temp:  [33.8  F (1  C)-98.8  F (37.1  C)] 97.9  F (36.6  C)  Pulse:  [] 71  Resp:  [9-34] 16  BP: ()/(32-71) 105/43  MAP:  [54 mmHg-111 mmHg] 63 mmHg  Arterial Line BP: ()/(31-88) 97/37  SpO2:  [89 %-100 %] 98 %       Constitutional: Awake, alert, cooperative,feels poorly  Lungs: Clear to auscultation bilaterally  Cardiovascular: S1S2 loud systolic murmur  Abdomen: soft with some suprapubic tenderness  Skin: thin skin, ecchymosis  MS : Right shoulder in surgical dressing    Other:    Medications     amiodarone 0.5 mg/min (05/01/22 0800)     sodium chloride 100 mL/hr at 04/30/22 1639       aspirin  81 mg Oral BID     ceFAZolin  2 g Intravenous Q8H     polyethylene glycol  17 g Oral Daily     rosuvastatin  20 mg Oral Daily     senna-docusate  1 tablet Oral BID    Or     senna-docusate  2 tablet Oral BID     sodium chloride (PF)  3 mL Intracatheter Q8H       Data   All microbiology laboratory data reviewed.  Recent Labs   Lab Test 04/30/22  1726 04/29/22  1839 04/27/22  0722   WBC 21.5* 19.3* 18.2*   HGB 9.0* 10.8* 10.0*   HCT 27.7* 33.2* 30.7*   MCV 95 94 93    298 246     Recent Labs   Lab Test 05/01/22  0418 04/30/22  1726 04/28/22  0733   CR 0.64 0.54 0.78     Microbiology  4/24 and 4/25 R shoulder   Shoulder, Right; Synovial fluid            0 Result Notes     Culture 4+ Staphylococcus aureus Abnormal               Resulting Agency: [facility]         Susceptibility              Staphylococcus aureus       [place]       Clindamycin   Resistant 1       Erythromycin 4.0 ug/mL Intermediate       Gentamicin <=0.5 ug/mL Susceptible       Oxacillin 0.5 ug/mL Susceptible 2       Tetracycline <=1.0 ug/mL Susceptible       Trimethoprim/Sulfamethoxazole <=0.5/9.5 u... Susceptible       Vancomycin 1.0 ug/mL Susceptible               4/29 Blood cx no growth so far, 4/28 negative cx pending, 4/27 Positive, 4/25 +, 4/24 +   Line, venous; Blood          0 Result  Notes     Culture Positive on the 1st day of incubation Abnormal         Staphylococcus aureus Panic     2 of 2 bottles         Resulting Agency: [facility]         Susceptibility              Staphylococcus aureus       [place]IS       Clindamycin   Resistant 1       Erythromycin 1.0 ug/mL Intermediate       Gentamicin <=0.5 ug/mL Susceptible       Oxacillin 0.5 ug/mL Susceptible 2       Tetracycline <=1.0 ug/mL Susceptible       Trimethoprim/Sulfamethoxazole <=0.5/9.5 u... Susceptible       Vancomycin 1.0 ug/mL Susceptible                 Imaging  4/29 LOKESH  Interpretation Summary     The left ventricle is normal in size.  The visual ejection fraction is 60-65%.  No regional wall motion abnormalities noted.  There is a large vegetation or mass on the aortic valve.  Mobile 1cm mass associated with the left coronary leaflet of the aortic valve  HIGHLY SUSPICIOUS for infective endocarditis with associated aortic  regurgitation.  There is moderately severe (3+) aortic regurgitation.  There is a small vegetation or mass on the mitral valve.  0.5 cm posterior leaflet homogenous density  There is mild to moderate (1-2+) mitral regurgitation.     Compared to the TTE from 4/26/2022 the aortic regurgitation is new and along  with the visible mass on the aortic and to a lesser extent the mitral valve is  HIGHLY SUGGESTIVE OF INFECTIVE ENDOCARDITIS with damage to the function of the  aortic valve.     4/24 CT abdomen pelvis  EXAM: CT ABDOMEN PELVIS W CONTRAST  LOCATION: [facility]  DATE/TIME: 4/24/2022 2:14 PM     INDICATION: Abdominal pain, acute, nonlocalized  COMPARISON: None.  TECHNIQUE: CT scan of the abdomen and pelvis was performed following injection of IV contrast. Multiplanar reformats were obtained. Dose reduction techniques were used.  CONTRAST: 60mL Isovue 370     FINDINGS:   LOWER CHEST: Bibasilar primarily groundglass opacities, most pronounced in the right lower lobe. No pleural effusion. Small  hiatal hernia.     HEPATOBILIARY: Cholelithiasis without evidence of acute cholecystitis. No biliary obstruction.     PANCREAS: Normal.     SPLEEN: Normal.     ADRENAL GLANDS: Normal.     KIDNEYS/BLADDER: Wedge-shaped area of hypoenhancement in the upper pole of the right kidney measuring up to 1.9 cm (coronal image 59). No hydronephrosis. Urinary bladder is distended but otherwise unremarkable.     BOWEL: Prior right hemicolectomy without evidence of bowel obstruction. There is mucosal hyperenhancement and submucosal edema involving the gastric antrum (series 4 image 55).     LYMPH NODES: Normal.     VASCULATURE: Ectasia of the infrarenal abdominal aorta measuring up to 2.7 cm with scattered calcified atherosclerosis.     PELVIC ORGANS: Prior hysterectomy.     MUSCULOSKELETAL: Prior vertebroplasty of L2, L3 and L5 with age-indeterminate compression deformity of L1. 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                                                                      IMPRESSION:   1.  Wedge-shaped area of hypoattenuation the upper pole of the right kidney. Given morphology, favor evolving infarction but consider correlation with urinalysis.  2.  Possible gastritis involving the distal stomach.  3.  Bibasilar groundglass pulmonary opacities, most pronounced in the right lower lobe, can be seen with aspiration and/or pneumonia.  4.  Multiple prior vertebroplasties with additional age-indeterminate compression deformity of L1.

## 2022-05-02 NOTE — PROGRESS NOTES
"0800: pt in HERRERA w/ HR sustained in 140s-150s, asymptomatic, states \"heart racing\". Gave PRN metoprolol IV and scheduled PO amiodarone.  HR now 90s-110s and pt denies sx. MD notified as FYI. No new orders.   "

## 2022-05-02 NOTE — PROVIDER NOTIFICATION
Pt in H[facility] RVR most of shift today. PRN IV Metoprolol given x2 for HR sustained in 140's. Spoke with cardiology and they will see her this afternoon. They will not add or change anything at the moment as palliative care is being consulted. Pt denies CP, c/o minor SOB at times. Pt getting 2LPM via NC with SaO2 in low to mid 90's. Pt currently most concerned with her right shoulder pain. Will continue to monitor closely.

## 2022-05-02 NOTE — PROGRESS NOTES
Orthopedic Surgery  Tamar Rose  2022  Admit Date:  2022  POD: 2 Days Post-Op   Procedure(s):  IRRIGATION AND DEBRIDEMENT, SHOULDER Arthroscopic  RIGHT    Alert and oriented.   Continues to have shoulder pain    Tolerating oral intake.    Denies nausea or vomiting    Vital Sign Ranges  Temperature Temp  Av.3  F (36.8  C)  Min: 98.1  F (36.7  C)  Max: 98.6  F (37  C)   Blood pressure Systolic (24hrs), Av , Min:87 , Max:143        Diastolic (24hrs), Av, Min:45, Max:110      Pulse Pulse  Av.5  Min: 78  Max: 144   Respirations Resp  Av.3  Min: 17  Max: 33   Pulse oximetry SpO2  Av.5 %  Min: 91 %  Max: 98 %       Dressing is clean, dry, and intact.   Pain with passive motion of the shoulder  NVI with equal sensation and pulses  Able to flex/extend wrist and fingers   Labs:  Recent Labs   Lab Test 22  1028 22  1726 22  1839   WBC 18.1* 21.5* 19.3*     Recent Labs   Lab Test 22  1028 22  1726 22  1839   HGB 9.1* 9.0* 10.8*     No results for input(s): INR in the last 53651 hours.  Recent Labs   Lab Test 22  1028 22  1726 22  1839    257 298     CRP Inflammation   Date Value Ref Range Status   2022 78.7 (H) 0.0 - 8.0 mg/L Final       A/P  1. S/p repeat arthroscopic I&D of septic native right shoulder.               Continue amb and SCDs for DVT prophylaxis.                Mobilize with PT/OT               WBAT, ROMAT               Sling for comfort              IV abx per ID.                Continue current pain regimen.     2. Disposition              Anticipate d/c to TCU based on medical clearance       Serena Ashley PA-C

## 2022-05-02 NOTE — PLAN OF CARE
BP softer this afternoon. Tele AFib RVR, cardiology aware. Palliative care consult placed by hospitalist. ID following. C/o pain to right shoulder. Gave PRN tylenol and PRN oxycodone.     Pt transferred to [facility] on tele with all belongings via flying squad nurse. Report given to receiving RN.

## 2022-05-02 NOTE — PROGRESS NOTES
[facility]    Cardiology Progress Note     Assessment & Plan     This is a 87 year old female with a history of colon cancer s/p colectomy 2017, dyslipidemia, osteoporosis, colitis, and iron deficiency anemia who was admitted to [facility] 04/24 with several day history of nausea, diarrhea, and worsening right shoulder pain found to have MSSA bacteremia. Blood cultures on admission growing MSSA, shoulder arthrocentesis 04/24 growing MSSA. To OR 04/25 with ortho for I&D. LOKESH 04/29 which demonstrated large mobile 1 cm mass on left coronary cusp of aortic valve with associated 3+ AI, and small ~0.5 cm mass on posterior leaflet of mitral valve with 1-2+ mitral regurgitation. Patient was declined surgical intervention given age and high mortality risk.       1. Chest pain, likely due to atrial fibrillation with RVR (heart rate as high as 190 bpm).  Currently chest pain-free.    -Given large mobile vegetation on aortic valve and also vegetation on mitral valve, considered embolization down to coronary artery, however ECG did not demonstrate any significant ischemic changes (when heart rate was 100 bpm).  Troponin only mildly elevated.  CT PE study negative for PE, did demonstrate small to moderate bilateral pleural effusions.  TTE 4/30/2022 LVEF >70%.     2. Atrial fibrillation with RVR, new onset at approx 1600 in PACU s/p Right shoulder I&D 4/30/22. Then converted to NSR on amiodarone infusion however now back in rapid AFIB which is expected given mitral regurgitation and valvular disease   -continue amiodarone oral  -continue metoprolol   -can consider IV digoxin however she is likely going to live in high rates given systemic illness, severe valvular disease that will be difficult to control    3. Endocarditis with valvular destruction, mod-severe AI, mod MR.    -Given non surgical candidate and poor prognosis would highly consider palliative care.     Cardiology will sign off. Please page with  questions or if you'd like our assistance in palliative discussions.        Thalia Crocker MD  Text Page  (Monday - Friday, 8 am- 5 pm)    Interval History     CVTS saw patient, no surgical intervention. Patient went back into rapid AFIB.     Physical Exam   Temp: 98.3  F (36.8  C) Temp src: Oral BP: 118/68 Pulse: 118   Resp: (!) 33 SpO2: 95 % O2 Device: Nasal cannula Oxygen Delivery: 2 LPM  Vitals:    04/29/22 0700 05/01/22 0600 05/02/22 0500   Weight: 61.9 kg (136 lb 7.4 oz) 61 kg (134 lb 7.7 oz) 62.3 kg (137 lb 5.6 oz)     Vital Signs with Ranges  Temp:  [98.1  F (36.7  C)-98.6  F (37  C)] 98.3  F (36.8  C)  Pulse:  [] 118  Resp:  [17-33] 33  BP: ()/() 118/68  SpO2:  [91 %-98 %] 95 %  I/O last 3 completed shifts:  In: 3408.38 [P.O.:200; I.V.:3208.38]  Out: 3160 [Urine:3160]  Patient Active Problem List   Diagnosis     CARDIOVASCULAR SCREENING; LDL GOAL LESS THAN 160     Pyogenic arthritis of right shoulder region, due to unspecified organism (H)       Constitutional: Lethargic  Respiratory: Clear to auscultation bilaterally, no crackles or wheezing  Cardiovascular: tachycardic, holosystolic murmur at apex   GI: Normal bowel sounds, soft, non-distended, non-tender  Skin/Integumen: No rashes, no cyanosis, no edema  Other:      Medications       amiodarone  200 mg Oral BID     [START ON 5/16/2022] amiodarone  200 mg Oral Daily     aspirin  81 mg Oral BID     ceFAZolin  2 g Intravenous Q8H     polyethylene glycol  17 g Oral Daily     rosuvastatin  20 mg Oral Daily     senna-docusate  1 tablet Oral BID    Or     senna-docusate  2 tablet Oral BID     sodium chloride (PF)  3 mL Intracatheter Q8H       Data   Results for orders placed or performed during the hospital encounter of 04/24/22 (from the past 24 hour(s))   Asymptomatic COVID-19 Virus (Coronavirus) by PCR Nasopharyngeal    Specimen: Nasopharyngeal; Swab   Result Value Ref Range    SARS CoV2 PCR Negative Negative    Narrative    Testing was  performed using the Xpert Xpress SARS-CoV-2 Assay on the   Cepheid Gene-Xpert Instrument Systems. Additional information about   this Emergency Use Authorization (EUA) assay can be found via the Lab   Guide. This test should be ordered for the detection of SARS-CoV-2 in   individuals who meet SARS-CoV-2 clinical and/or epidemiological   criteria. Test performance is unknown in asymptomatic patients. This   test is for in vitro diagnostic use under the FDA EUA for   laboratories certified under CLIA to perform high complexity testing.   This test has not been FDA cleared or approved. A negative result   does not rule out the presence of PCR inhibitors in the specimen or   target RNA in concentration below the limit of detection for the   assay. The possibility of a false negative should be considered if   the patient's recent exposure or clinical presentation suggests   COVID-19. This test was validated by the [facility]. This laboratory is certified under the Clinical Laboratory Improvement Amendments of 1988 (CLIA-88) as qualified to perform high complexity laboratory testing.     CRP inflammation   Result Value Ref Range    CRP Inflammation 78.7 (H) 0.0 - 8.0 mg/L

## 2022-05-02 NOTE — PROGRESS NOTES
[facility]    Medicine Progress Note - Hospitalist Service    Date of Admission:  4/24/2022    Assessment & Plan   Tamar Rose is a 87 year old female admitted on 4/24/2022.  Past history of colon cancer, hyperlipidemia, osteoporosis, microscopic colitis, and iron deficiency anemia who presented to the ED with 3 days of nausea, diarrhea, and progressive right shoulder pain.  Arthrocentesis in ED concerning for septic joint.  ID consulted and started on antibiotic treatment.  Has had persistent bacteremia so far.  Status post I&D x2, last I&D on 4/30/2022.  Postop complication with new onset A. fib.  Cardiology consulted and started on amiodarone drip now transition to amiodarone p.o.  LOKESH has revealed aortic and mitral vegetations.  Cardiac surgery consulted and recommend valve replacement, however there is concern this is high risk due to patient's frail status.  Family meeting held 5/1/2022, and at this time they are still undecided about surgery but leaning more towards not getting it.  We will consult palliative care to assist with goals of care.        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  * admission leukocytosis of 22 with lactate of 2.4;   * arthrocentesis 4/24 with turbid fluid with Staph aureus on culture; also noted calcium pyrophosphate crystals  * admission blood cultures positive for MSSA  * persistent bacteremia; repeat cultures daily  * TTE withouth vegetations  * Repeat I&D 4/30/22  * LOKESH with aortic and mitral vegetations seen  * CT surgery consult, Care conference had on 05/01to discuss urgent valve replacement; it appears patient and family remain undecided due to patient's frail status but are leaning more towards not getting it.  * ID  following, recommending continue cefazolin.  -Monitor temp profile, WBC, CRP [downtrending however continued elevated.].  -Palliative Care consult.       Possible pneumonia  Admission CXR demonstrating right basilar infiltrates, CT abd/pelvis with groundglass opacities of right base.  Treated for CAP with 7-day course of Ceftin starting 4/1/22, with resolution of productive cough.  Unclear if admission imaging reflects resolved PNA vs new MSSA PNA.   - currently on room air  - Ancef as above    Atrial fibrillation  --New onset developed 4/30/2022 post -op   -- Cardiology consult  --Initially on amiodarone drip, now transition to amiodarone p.o.  --Continue to monitor on telemetry.  --Contraindication to anticoagulation at this time.  -Continue episodes of A. fib RVR, intermittently responds to IV metoprolol.  BP stable, TFTs WNL.  Please see Cardiology consult.     Acute blood loss anemia  -Hemoglobin generally stable nines.  -CBC in AM.     Urinary retention  Noted to be retaining urine with nearly 1000 ml out on straight cath.    * very difficult catheterization requiring 8 Fr cath following admission  - unfortunately, carpenter was removed in PACU 4/25  - per Urology, follow PVR and straight cath with 8Fr if >500     Right upper pole kidney hypoattenuation-concern for infarction  No back or flank pain reported.  UA not congruent with infection.  Finding was noted on CT incidentally.  Renal US 4/24 wnl; lesion on CT not appreciated sonographically.     Nausea, vomiting, and diarrhea  Correlating with progressive worsening of right shoulder, possible gastritis on CT.  No bloody or black BM reported.  - No further nausea, emesis.  However due to CT findings start IV PPI.  CBC in AM.     Hyponatremia, resolved  Admission Na 132, suspect hypovolemic.  Normalized with IVF.      Chronic conditions  Osteoporosis  FRANCESCO  Hyperlipidemia: continue PTA statin  History of colon cancer s/p right hemicolectomy 8/2017; in  remission            Diet: Regular Diet Adult  Snacks/Supplements Adult: Ensure Enlive; Between Meals    DVT Prophylaxis: Pneumatic Compression Devices  Coley Catheter: PRESENT, indication: Strict 1-2 Hour I&O  Central Lines: None  Cardiac Monitoring: None  Code Status: Full Code      Disposition Plan   Expected Discharge: TBD in complex patient pending clinical improvement versus outcome of Palliative Care consult.    Denis Myers MD  Hospitalist Service  [facility]  Securely message with the Vocera Web Console (learn more here)  Text page via [employer] Paging/Directory     Total unit/floor time 35 minutes:  time consisted of the following assuming Patient care in complex Patient with bacteremia, septic shoulder joint, endocarditis, examination of patient, review of records including labs, imaging results, medications, interdisciplinary notes and completing documentation; > 50% Counseling/discussion with Patient regarding current condition including A. fib, RVR and Coordination of Care time with Nursing regarding A. fib RVR and Specialists, ID regarding antibiotic care plan, management and surveillance.         Clinically Significant Risk Factors Present on Admission                   ______________________________________________________________________    Interval History   Patient feels palpitations, no chest pain, dyspnea, continued episodic shoulder pain, she dislikes use of narcotic analgesia, on schedule acetaminophen.    Data reviewed today: I reviewed all medications, new labs and imaging results over the last 24 hours.    Physical Exam   Vital Signs: Temp: 98.6  F (37  C) Temp src: Oral BP: 106/64 Pulse: (!) 124   Resp: 25 SpO2: 97 % O2 Device: Nasal cannula Oxygen Delivery: 2 LPM  Weight: 137 lbs 5.55 oz  General Appearance:  NAD, alert, calm, cooperative.  Appears tired.  Respiratory: Rations nonlabored on RA.  Cardiovascular: A. Fib rate 120;   GI: Soft, nontender.  No  rebound, guarding or peritoneal signs.  Musculoskeletal, right shoulder dressed.  ROM not tested  Neuro.  Gross motor tested, nonfocal, sensory intact  Psych oriented, affect calm     Data   Recent Labs   Lab 05/01/22  1028 05/01/22  0836 05/01/22  0418 04/30/22  1726 04/29/22  1839 04/28/22  0733 04/27/22  0555 04/26/22  0831   WBC 18.1*  --   --  21.5* 19.3*  --    < > 17.2*   HGB 9.1*  --   --  9.0* 10.8*  --    < > 9.5*   MCV 92  --   --  95 94  --    < > 91     --   --  257 298  --    < > 239   NA  --   --  140 138  --   --   --  136   POTASSIUM  --   --  4.1 3.7  --   --   --  4.0   CHLORIDE  --   --  111* 108  --   --   --  109   CO2  --   --  21 20  --   --   --  21   BUN  --   --  11 10  --   --   --  15   CR  --   --  0.64 0.54  --  0.78  --  0.81   ANIONGAP  --   --  8 10  --   --   --  6   MYRIAM  --   --  6.8* 6.9*  --   --   --  8.0*   GLC  --  96 98 110*  --   --    < > 100*   ALBUMIN  --   --  1.4*  --   --   --   --   --    PROTTOTAL  --   --  4.5*  --   --   --   --   --    BILITOTAL  --   --  0.2  --   --   --   --   --    ALKPHOS  --   --  125  --   --   --   --   --    ALT  --   --  30  --   --   --   --   --    AST  --   --  39  --   --   --   --   --     < > = values in this interval not displayed.

## 2022-05-02 NOTE — PROGRESS NOTES
[facility]    Infectious Disease Progress Note    Date of Service : 05/02/2022     Assessment:  87YF who is admitted with acute onset right shoulder pain/septic arthritis following steroid injection with high grade S.aureus MSSA sepsis with endocarditis of the aortic and mitral valves , now with cardiac arrythmia concerning for valvular abscess     -Aortic valve endocarditis with 1cm mass on the left coronary leaflet with aortic regurgitation, and small vegetation on the mitral valve. Surgery is indicated but she is a poor surgical candidates and ma transition to hospice   -Chest pain and atrial fibrillation with RVR related to complications of endocarditis . With persistent arrythmia concerned about valvular abscess  -High grade MSSA bacteremia  -Right shoulder septic arthritis with hx of steroid injection prior to onset of symptoms. S/p arthroscopic debridement with uncontrolled infection  -Ongoing leukocytosis and CRP elevation  -Right renal infarct  -Recent pneumonia treated with oral antibiotics. CT shows bilateral ground glass opacities. Resolving pneumonia vs staph pneumonia as source for sepsis  -Systolic murmur  -Pseudogout with positive synovial fluid crystals for calcium pyrophosphate  -chronic medical conditions - Hx of colon cancer, osteoporosis, microscopic colitis, hyperlipidemia     Recommendations  1. Cardiology following   2. Continue Cefazolin  3. At risk for additional complications of endocarditis , embolizatioin, CVA, cardiac arrythmias  4. Poor candidate for valve replacement surgery  5. May transition to comfort care, if ongoing medical treatment is desired, she can receive a PICC line for ongoing antibiotic therapy. However, given persistent arrythmias, she may have valve ring abscess and antibiotics alone will not control infection. Palliative care would be recommended      Enedina Nathan MD, MD    Interval History   Persistent atrial fibrillation , no surgery is  planned, feels tired and weak, ongoing right shoulder pain, remained afebrile.    Physical Exam   Temp: 98.6  F (37  C) Temp src: Oral BP: 106/64 Pulse: (!) 124   Resp: 25 SpO2: 97 % O2 Device: Nasal cannula Oxygen Delivery: 2 LPM  Vitals:    04/29/22 0700 05/01/22 0600 05/02/22 0500   Weight: 61.9 kg (136 lb 7.4 oz) 61 kg (134 lb 7.7 oz) 62.3 kg (137 lb 5.6 oz)     Vital Signs with Ranges  Temp:  [98.1  F (36.7  C)-98.6  F (37  C)] 98.6  F (37  C)  Pulse:  [] 124  Resp:  [17-33] 25  BP: ()/() 106/64  SpO2:  [91 %-98 %] 97 %      Constitutional: tired and sleepy, cooperative, feels poorly  Lungs: Clear to auscultation bilaterally  Cardiovascular: S1S2 loud systolic murmur  Abdomen: soft with some suprapubic tenderness  Skin: thin skin, ecchymosis  MS : Right shoulder in surgical dressing    Other:    Medications       amiodarone  200 mg Oral BID     [START ON 5/16/2022] amiodarone  200 mg Oral Daily     aspirin  81 mg Oral BID     ceFAZolin  2 g Intravenous Q8H     polyethylene glycol  17 g Oral Daily     rosuvastatin  20 mg Oral Daily     senna-docusate  1 tablet Oral BID    Or     senna-docusate  2 tablet Oral BID     sodium chloride (PF)  3 mL Intracatheter Q8H       Data   All microbiology laboratory data reviewed.  Recent Labs   Lab Test 05/01/22  1028 04/30/22  1726 04/29/22  1839   WBC 18.1* 21.5* 19.3*   HGB 9.1* 9.0* 10.8*   HCT 27.4* 27.7* 33.2*   MCV 92 95 94    257 298     Recent Labs   Lab Test 05/01/22  0418 04/30/22  1726 04/28/22  0733   CR 0.64 0.54 0.78     Imaging  4/29 LOKESH  Interpretation Summary     The left ventricle is normal in size.  The visual ejection fraction is 60-65%.  No regional wall motion abnormalities noted.  There is a large vegetation or mass on the aortic valve.  Mobile 1cm mass associated with the left coronary leaflet of the aortic valve  HIGHLY SUSPICIOUS for infective endocarditis with associated aortic  regurgitation.  There is moderately severe  (3+) aortic regurgitation.  There is a small vegetation or mass on the mitral valve.  0.5 cm posterior leaflet homogenous density  There is mild to moderate (1-2+) mitral regurgitation.     Compared to the TTE from 4/26/2022 the aortic regurgitation is new and along  with the visible mass on the aortic and to a lesser extent the mitral valve is  HIGHLY SUGGESTIVE OF INFECTIVE ENDOCARDITIS with damage to the function of the  aortic valve.     4/24 CT abdomen pelvis  EXAM: CT ABDOMEN PELVIS W CONTRAST  LOCATION: [facility]  DATE/TIME: 4/24/2022 2:14 PM     INDICATION: Abdominal pain, acute, nonlocalized  COMPARISON: None.  TECHNIQUE: CT scan of the abdomen and pelvis was performed following injection of IV contrast. Multiplanar reformats were obtained. Dose reduction techniques were used.  CONTRAST: 60mL Isovue 370     FINDINGS:   LOWER CHEST: Bibasilar primarily groundglass opacities, most pronounced in the right lower lobe. No pleural effusion. Small hiatal hernia.     HEPATOBILIARY: Cholelithiasis without evidence of acute cholecystitis. No biliary obstruction.     PANCREAS: Normal.     SPLEEN: Normal.     ADRENAL GLANDS: Normal.     KIDNEYS/BLADDER: Wedge-shaped area of hypoenhancement in the upper pole of the right kidney measuring up to 1.9 cm (coronal image 59). No hydronephrosis. Urinary bladder is distended but otherwise unremarkable.     BOWEL: Prior right hemicolectomy without evidence of bowel obstruction. There is mucosal hyperenhancement and submucosal edema involving the gastric antrum (series 4 image 55).     LYMPH NODES: Normal.     VASCULATURE: Ectasia of the infrarenal abdominal aorta measuring up to 2.7 cm with scattered calcified atherosclerosis.     PELVIC ORGANS: Prior hysterectomy.     MUSCULOSKELETAL: Prior vertebroplasty of L2, L3 and L5 with age-indeterminate compression deformity of L1. 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                                                                      IMPRESSION:   1.  Wedge-shaped area of hypoattenuation the upper pole of the right kidney. Given morphology, favor evolving infarction but consider correlation with urinalysis.  2.  Possible gastritis involving the distal stomach.  3.  Bibasilar groundglass pulmonary opacities, most pronounced in the right lower lobe, can be seen with aspiration and/or pneumonia.  4.  Multiple prior vertebroplasties with additional age-indeterminate compression deformity of L1.

## 2022-05-02 NOTE — PROGRESS NOTES
CV Surgery Brief Progress Note    Tamar Rose is a 87 year old female with a history of colon cancer s/p colectomy 2017, dyslipidemia, osteoporosis, colitis, and iron deficiency anemia who was admitted to [facility] 04/24 with several day history of nausea, diarrhea, and worsening right shoulder pain. Blood cultures on admission growing MSSA, shoulder arthrocentesis 04/24 growing MSSA. To OR 04/25 with ortho for I&D. Initial TTE on 04/24 without obvious vegetation or AI, but due to persistently positive blood cultures underwent LOKESH 04/29 which demonstrated large mobile 1 cm mass on left coronary cusp of aortic valve with associated 3+ AI, and small ~0.5 cm mass on posterior leaflet of mitral valve with 1-2+ mitral regurgitation.  CV surgery consulted for consideration of AVR/MVR.      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 Surgical AVR/MVR indicated, although high risk morbidity/mortality given age, frailty, and large surgery. Dr. Del Angel met with patient and family 05/01 and recommended medical management. Will defer pre-operative work-up. She would not be a candidate for urgent or emergent surgery if she acutely decompensates.   - High risk for hemodynamic decompensation with progressive AI. Recommend cardiology consultation, daily EKG, and surveillance for heart block which may indicate abscess formation. Palliative consultation may also be helpful.   - Other cares per primary team  - Thank you for the opportunity to participate in the care of this patient. CV surgery will follow peripherally.    Bing Lo PA-C  Cardiothoracic Surgery  Pager [phone]

## 2022-05-02 NOTE — PROVIDER NOTIFICATION
Wheezing    D: Patient noted to have more audible wheezing later in shift. Remains on 2 liters nasal canula with oxygen saturations in the mid 90s, but work of breathing slightly increased. IV fluids running at 100 ml/hr.  A: Updated hospitalist. Order received to stop IV fluids, and give lasix and PRN nebs.  R: Work of breathing slightly improved after interventions. Will continue to monitor.

## 2022-05-02 NOTE — PROGRESS NOTES
Xcoverage: paged by RN because the patient c/o some SOB and also some wheezing noted; chart reviewed; will stop iv fluids, Lasix 20 mg ivX1, alb neb prn ordered.    Citlalli Culver MD

## 2022-05-02 NOTE — PLAN OF CARE
Shift Summary    Vital signs remaining within parameters. Cardiac monitor showing sinus rhythm with no episodes. Having roughly 1700ml of urine output after lasix was given. Work of breathing much improved after lasix and neb given. Complaining of shoulder pain, but refusing to take Oxy IR this shift. One small bowel movement noted. Repositioning self in bed. Resting between cares.

## 2022-05-03 NOTE — CONSULTS
"[facility]  Palliative Care Consultation Note    Patient: Tamar Rose  Date of Admission:  4/24/2022    Requesting Clinician / Team: Dr Myers/Hospitalist  Reason for consult: Goals of care    Recommendations:    Goals of Care (see in depth discussion below): Tamar would like to continue current cares for a few more days to see how things go. She would like to continue discussion regarding goals of care at that time. She and the family were leaning away from cardiac surgical due to high risk. Palliative care will continue to follow for ongoing goals of care discussions.    I spoke with daughter Viktoriya on the phone: they would possibly consider comfort care if treating MDs confer to them that Tamar will likely \"not get better\" or improve to where she can leave hospital setting.    Code status: Full Code    Advanced Care Planning:    Patient has a completed Health Care Directive: No.       Shoulder pain related to arthritis, recent surgery, infection.  Has good relief with current medications so would recommend to continue current regimen.  -Agree with oxycodone 5 mg every 6 hours as needed  Agree with hydromorphone 0.2 through 0.4 mg IV every 2 hours as needed-for severe pain when fast relief needed.  -Agree with acetaminophen 650 mg every 6 hours as needed.    Weakness/fatigue related to severe illness, infection, deconditioning.  -PT/OT for strengthening  -Assist with transfers and repositioning as needed    Patient case was reviewed with Dr. Myers and RN.    Shyann Pennington, [facility]  Contact information available via [facility] Paging/Directory        Thank you for the opportunity to participate in the care of this patient and family. Our team: will continue to follow.     During regular M-F work hours ([id number]) -- if you are not sure who specifically to contact -- please contact us on [facility] Smart Web.     After regular work hours and on weekends/holidays, " you can call our answering service at [phone].     Attestation:  Total time on the floor involved in the patient's care: 85 minutes  Total time spent in counseling/care coordination: >50% discussing patient condition, assessment of symptoms, reviewing current status with MD and RN,     Assessments:  Tamar Rose is a 87 year old female with PMH significant for osteoporosis, arthritis, colon cancer, microscopic colitis, iron deficiency anemia.  She presented to ED on 4/24/2022 for severe shoulder pain status post cortisone injection, ultimately leading to nausea, and diarrhea.  She underwent 2 I&D's of her shoulder and found to have MSSA with bacteremia.  She developed postop complications with new onset A. fib.  LOKESH discovered aortic and mitral valve vegetations.  Cardiac surgery evaluated and recommended valve replacement however due to patient's poor functional status and age she would be at high risk for poor outcomes and quality of life post surgery.  Patient and family have decided to forego the surgery at this time.    Today, the patient was seen for:   Goals of care  Decisional support    I introduced palliative care services and our multidisciplinary team. Explained that our service offers an extra layer of support for individuals who are experiencing serious, life threatening illnesses, which can include assistance with symptom management, emotional and spiritual support as well as complex medical decision making. Reviewed past medical history and patient/family understanding of the current medical situation.  Daughter Viktoriya was unable to come in for meeting due to working and usually comes in evening,  but we did speak on the phone.  Tamar has had a long and difficult course since admission to hospital with 2 shoulder surgeries, finding she has infected MSSA bacterial infection, infective endocarditis, and leakage of aorta and mitral valves.  Surgery has been consulted and cautioned that that he  may not tolerate surgery well due to her functional status.  If she did have the surgery the thought was she would not be able to recover her strength or previous quality of life.  They are fairly certain they do not want to pursue surgical interventions.  Without the replacement of valves it is likely that her cardiac function will continue to decompensate.  There has been some consideration of comfort focused care. We discussed the concept of comfort focused care where the focus goes from cure to promoting treatment of distressing symptoms such as pain, nausea, anxiety with goal of not pursuing hospitalization or further aggressive medical cares.     Viktoriya is hoping her mother could continue antibiotics and eventually find some stability medically.   Viktoriya states that they would give comfort care more serious thought if treating doctors determined that she will not prove medically.  If there is some hope of her becoming somewhat stable medically they would like to give her that chance. When speaking with Tamar in her room she explains how there was a conference on Sunday with cardiology and difficult news to hear about her condition.  Her current wishes are to have a few more days to see how she does on antibiotics and continue discussion at that point.  She would like the opportunity to improve if she can.  Palliative care will return on Thursday or Friday to continue discussion regarding goals of care.    Prognosis, Goals, & Planning:        Prognosis, Goals, and/or Advance Care Planning were addressed today: Yes      Functional Status just prior to hospitalization: 1 (Restricted in physically strenuous activity but ambulatory and able to carry out work of a light or sedentary nature)          Patient has decision-making capacity today for complex decisions: Partial (needs assistance with complex decisions)      Patient's decision making preferences: shared with support from loved ones          I have concerns  about the patient/family's health literacy today: No           Coping, Meaning, & Spirituality:   Mood, coping, and/or meaning in the context of serious illness were addressed today: Yes  Summary/Comments: Patient is [employer] and would likely enjoy visit from .      Social:     Living situation: Tamar lives with her son in [facility]    Key family / caregivers: Daughter Viktoriya is primary contact    History of Present Illness:   History gathered today from: patient, family/loved ones, medical chart, medical team members  Adopted from H&P:  Tamar Rose is a 87 year old female with PMH significant for osteoporosis, arthritis, colon cancer, microscopic colitis, iron deficiency anemia.  She presented to ED on 4/24/2022 for severe shoulder pain status post cortisone injection, ultimately leading to nausea, and diarrhea.  She underwent 2 I&D's of her shoulder and found to have MSSA with bacteremia.  She developed postop complications with new onset A. fib.  LOKESH discovered aortic and mitral valve vegetations.  Cardiac surgery evaluated and recommended valve replacement however due to patient's poor functional status and age she would be at high risk for poor outcomes and quality of life post surgery.  Patient and family have decided to forego the surgery at this time.    Key Palliative Symptom Data:  # Pain severity the last 12 hours: moderate  # Dyspnea severity the last 12 hours: none  # Nausea severity the last 12 hours: none  # Anxiety severity the last 12 hours: none    Patient is on opioids: assessed and bowels ok/no needed changes to plan of care today. Had a BM today.    ROS:  Comprehensive ROS is reviewed and is negative except as here & per HPI: N/A     Past Medical History:  Past Medical History:   Diagnosis Date     NO ACTIVE PROBLEMS         Past Surgical History:  Past Surgical History:   Procedure Laterality Date     ARTHROSCOPY SHOULDER, OPEN DEBRIDEMENT, COMBINED Right 4/25/2022     Procedure: IRRIGATION AND DEBRIDEMENT, RIGHT NATIVE SHOULDER;  Surgeon: Baljinder Cline MD;  Location:  OR     HYSTERECTOMY, MATT       IRRIGATION AND DEBRIDEMENT SHOULDER, COMBINED Right 4/30/2022    Procedure: IRRIGATION AND DEBRIDEMENT, SHOULDER Arthroscopic  RIGHT;  Surgeon: Wero Murphy MD;  Location:  OR         Family History:  Family History   Problem Relation Age of Onset     Cancer Maternal Grandmother          Allergies:  Allergies   Allergen Reactions     Sulfa Drugs Rash        Medications:  I have reviewed this patient's medication profile and medications from this hospitalization.     Noted scheduled meds are:    acetaminophen  650 mg Oral 4x Daily     amiodarone  200 mg Oral BID     [START ON 5/16/2022] amiodarone  200 mg Oral Daily     aspirin  81 mg Oral BID     ceFAZolin  2 g Intravenous Q8H     hydrOXYzine  25 mg Oral TID     polyethylene glycol  17 g Oral Daily     rosuvastatin  20 mg Oral Daily     senna-docusate  1 tablet Oral BID    Or     senna-docusate  2 tablet Oral BID     sodium chloride (PF)  3 mL Intracatheter Q8H       Noted PRN meds are:  acetaminophen **OR** acetaminophen, albuterol, sore throat lozenge, bisacodyl, calcium carbonate, cyclobenzaprine, HYDROmorphone, lidocaine 4%, lidocaine (buffered or not buffered), magnesium hydroxide, melatonin, metoprolol, naloxone **OR** naloxone **OR** naloxone **OR** naloxone, nitroGLYcerin, ondansetron **OR** ondansetron, oxyCODONE, polyethylene glycol, prochlorperazine **OR** prochlorperazine, sodium chloride (PF)    Physical Exam:  Vital Signs: Temp: 98  F (36.7  C) Temp src: Axillary BP: 99/53 Pulse: (!) 157   Resp: 16 SpO2: 92 % O2 Device: Nasal cannula Oxygen Delivery: 3 LPM  Weight: 137 lbs 1.6 oz    Physical Exam  GENERAL:  Alert, fatigued, no distress, interactive.  HEAD: Normocephalic atraumatic  SCLERA: Anicteric  EXTREMITIES: Warm; no edema, bilateral LE bruising.  ABDOMEN:  Soft, nontender  RESPIRATORY: Breathing relaxed  and nonlabored.  CARDIOVASCULAR: RRR  NEUROLOGIC: Alert.  PSYCH: Calm, cooperative.    Data reviewed:  Recent imaging reviewed.  Results for orders placed or performed during the hospital encounter of 04/24/22   CT Abdomen Pelvis w Contrast    Impression    IMPRESSION:   1.  Wedge-shaped area of hypoattenuation the upper pole of the right kidney. Given morphology, favor evolving infarction but consider correlation with urinalysis.  2.  Possible gastritis involving the distal stomach.  3.  Bibasilar groundglass pulmonary opacities, most pronounced in the right lower lobe, can be seen with aspiration and/or pneumonia.  4.  Multiple prior vertebroplasties with additional age-indeterminate compression deformity of L1.   US Renal Complete    Impression    IMPRESSION:  1.  Normal kidney ultrasound. Hypodense lesion upper pole right kidney seen on CT not appreciated sonographically.   XR Chest Port 1 View    Impression    IMPRESSION: Moderate infiltrates within the right lower lung most consistent with pneumonia. Additional mild infiltrates in the left lung base may be related. Normal heart size and pulmonary vascularity.   XR Chest Port 1 View    Impression    IMPRESSION: Previously noted bilateral lower lung infiltrates have improved. There may be a small amount of pleural fluid bilaterally. Heart size is stable. Pulmonary vascularity is within normal limits.   CT Chest Pulmonary Embolism w Contrast    Impression    IMPRESSION:  1.  No evidence for pulmonary embolism.  2.  Smooth interlobular septal thickening in both lungs likely represents pulmonary edema.  3.  Small to moderate bilateral pleural effusions, with mild associated atelectasis at both lung bases.  4.  Mild patchy groundglass opacity at the right lung apex may also be related to edema, although a superimposed infectious process cannot be excluded.   Echocardiogram Complete   Result Value Ref Range    LVEF  65-70%    Echocardiogram LOKESH   Result Value Ref  Range    LVEF  60-65%    Echocardiogram Limited   Result Value Ref Range    LVEF  >70%           Lab Results   Component Value Date    WBC 16.0 (H) 05/03/2022    WBC 18.1 (H) 05/01/2022    WBC 21.5 (H) 04/30/2022    HGB 8.6 (L) 05/03/2022    HGB 9.1 (L) 05/01/2022    HGB 9.0 (L) 04/30/2022    HCT 26.2 (L) 05/03/2022    HCT 27.4 (L) 05/01/2022    HCT 27.7 (L) 04/30/2022     05/03/2022     05/01/2022     04/30/2022     05/03/2022     05/01/2022     04/30/2022    POTASSIUM 3.6 05/03/2022    POTASSIUM 4.1 05/01/2022    POTASSIUM 3.7 04/30/2022    CHLORIDE 109 05/03/2022    CHLORIDE 111 (H) 05/01/2022    CHLORIDE 108 04/30/2022    CO2 25 05/03/2022    CO2 21 05/01/2022    CO2 20 04/30/2022    BUN 14 05/03/2022    BUN 11 05/01/2022    BUN 10 04/30/2022    CR 0.61 05/03/2022    CR 0.64 05/01/2022    CR 0.54 04/30/2022     (H) 05/03/2022    GLC 96 05/01/2022    GLC 98 05/01/2022    NTBNPI 11,044 (H) 05/03/2022    AST 39 05/01/2022    AST 37 04/25/2022    AST 26 04/24/2022    ALT 30 05/01/2022    ALT 38 04/25/2022    ALT 33 04/24/2022    ALKPHOS 125 05/01/2022    ALKPHOS 88 04/25/2022    ALKPHOS 67 04/24/2022    BILITOTAL 0.2 05/01/2022    BILITOTAL 0.6 04/25/2022    BILITOTAL 0.5 04/24/2022      Lab Results   Component Value Date    ALBUMIN 1.4 05/01/2022

## 2022-05-03 NOTE — PROGRESS NOTES
SPIRITUAL HEALTH SERVICES  SPIRITUAL ASSESSMENT Progress Note  [facility]     REFERRAL SOURCE: Staff referral    Introduced myself and spiritual health support to Tamar who declined a visit today, but requested a follow up tomorrow.     PLAN: Will follow up tomorrow per patient request.    Reyna Vasquez  Associate   Pager [phone]  Phone [phone]

## 2022-05-03 NOTE — PLAN OF CARE
"Goal Outcome Evaluation:    Neuro- A&Ox4  Most Recent Vitals- BP 90/42   Pulse 84   Temp 97.7  F (36.5  C) (Oral)   Resp 16   Ht 1.702 m (5' 7\")   Wt 62.2 kg (137 lb 1.6 oz)   SpO2 97%   BMI 21.47 kg/m    Soft BP's.   Tele/Cardiac- A fib RVR, converted to SR overnight, then back to a fib RVR. PRN metoprolol given per orders at 0420 am.  1900-A fib RVR,   2330-SR  0230-A fib, RVR  0547-SR  Resp- Room air  Activity- Up with 2   Pain- R shoulder pain treated with PRN Tylenol and oxycodone  Drips- Intermittent antibiotics   Drains/Tubes- PIV SL  Skin- Fragile skin, bruised and miguel. Turns encouraged. Dressing to R shoulder CDI   GI/- Coley with yellow urine output, No BM overnight.   Aggression Color- green  Plan- Plan for palliative care consult today.   Misc-      Malu Marroquin RN    "

## 2022-05-03 NOTE — PROGRESS NOTES
[facility]    Infectious Disease Progress Note    Date of Service : 05/03/2022     Assessment:  87YF who is admitted with high grade S.aureus MSSA sepsis and endocarditis of the aortic and mitral valves with right shoulder septic arthritis. Course isfurther complicated by cardiac arrythmia with concern for valve abscess     -Aortic valve endocarditis with 1cm mass on the left coronary leaflet with aortic regurgitation, and small vegetation on the mitral valve. Surgery is indicated but she is a poor surgical candidates and ma transition to hospice   -Chest pain and atrial fibrillation with RVR related to complications of endocarditis . With persistent arrythmia concerned about valvular abscess  -High grade MSSA bacteremia  -Right shoulder septic arthritis with hx of steroid injection prior to onset of symptoms. S/p arthroscopic debridement with uncontrolled infection  -Ongoing leukocytosis and CRP elevation  -Right renal infarct  -Recent pneumonia treated with oral antibiotics. CT shows bilateral ground glass opacities. Resolving pneumonia vs staph pneumonia as source for sepsis  -Systolic murmur  -Pseudogout with positive synovial fluid crystals for calcium pyrophosphate  -chronic medical conditions - Hx of colon cancer, osteoporosis, microscopic colitis, hyperlipidemia     Recommendations  1. Cardiology following   2. Continue Cefazolin  3. At risk for additional complications of endocarditis , embolizatioin, CVA, cardiac arrythmias  4. Poor candidate for valve replacement surgery  5. Palliative care consult has been requested     Enedina Nathan MD, MD    Interval History   Not doing well, continues to feel poorly, short of breath, ongoing right shoulder pain, chest discomfort. Persistent leukocytosis, CRP is rising, blood cxs are negative    Physical Exam   Temp: 98  F (36.7  C) Temp src: Axillary BP: 93/49 Pulse: 87   Resp: 16 SpO2: 97 % O2 Device: Nasal cannula Oxygen Delivery: 3  LPM  Vitals:    05/01/22 0600 05/02/22 0500 05/03/22 0405   Weight: 61 kg (134 lb 7.7 oz) 62.3 kg (137 lb 5.6 oz) 62.2 kg (137 lb 1.6 oz)     Vital Signs with Ranges  Temp:  [97.7  F (36.5  C)-98.4  F (36.9  C)] 98  F (36.7  C)  Pulse:  [] 87  Resp:  [16-27] 16  BP: ()/(39-93) 93/49  SpO2:  [92 %-99 %] 97 %    Constitutional: tired and sleepy, cooperative, feels poorly  Lungs: Clear to auscultation bilaterally  Cardiovascular: S1S2 loud systolic murmur  Abdomen: soft   Skin: thin skin, ecchymosis  MS : Right shoulder in surgical dressing    Other:    Medications       acetaminophen  650 mg Oral 4x Daily     amiodarone  200 mg Oral BID     [START ON 5/16/2022] amiodarone  200 mg Oral Daily     aspirin  81 mg Oral BID     ceFAZolin  2 g Intravenous Q8H     hydrOXYzine  25 mg Oral TID     [START ON 5/4/2022] pantoprazole (PROTONIX) IV  40 mg Intravenous Daily with breakfast     polyethylene glycol  17 g Oral Daily     rosuvastatin  20 mg Oral Daily     senna-docusate  1 tablet Oral BID    Or     senna-docusate  2 tablet Oral BID     sodium chloride (PF)  3 mL Intracatheter Q8H       Data   All microbiology laboratory data reviewed.  Recent Labs   Lab Test 05/03/22  0606 05/01/22  1028 04/30/22  1726   WBC 16.0* 18.1* 21.5*   HGB 8.6* 9.1* 9.0*   HCT 26.2* 27.4* 27.7*   MCV 93 92 95    290 257     Recent Labs   Lab Test 05/03/22  0606 05/01/22  0418 04/30/22  1726   CR 0.61 0.64 0.54

## 2022-05-03 NOTE — PLAN OF CARE
Care plan note:      Recent Vitals:  Temp: 98  F (36.7  C) Temp src: Axillary BP: (!) 89/48 Pulse: 87   Resp: 16 SpO2: 97 % O2 Device: Nasal cannula      Orientation/Neuro: Alert and Oriented x 4  Pain: Pain is not well controlled.  Medication(s) being used: tylenol, atarax, oxycodone and dilaudid for breakthrough pain.   Tele: Sinus rhythm  and Atrial fibrillation - rapid- received PRn IV metoprolol x1  for sustained HR greater than 140   IV medications: IV antibiotics   Mobility: Assist of 2   Skin: rt shoulder wound   GI:  loose stool this shift  : Coley Catheter     Diet: Tolerating diet:  Needs encouragement to eat  Orders Placed This Encounter      Regular Diet Adult      Safety/Concerns:  Fall Risk and Dion Risk  Aggression Color: Green    Plan: having palliative consult and increase meds for adequate pain control. On IV antibiotics.  Pt heart rhythm in between A-fib RVR and SR. Low BP between  systolic.    Continue to monitor.      Cassie Cantu RN

## 2022-05-03 NOTE — PROGRESS NOTES
[facility]    Medicine Progress Note - Hospitalist Service    Date of Admission:  4/24/2022    Assessment & Plan   Tamar Rose is a 87 year old female admitted on 4/24/2022.  Past history of colon cancer, hyperlipidemia, osteoporosis, microscopic colitis, and iron deficiency anemia who presented to the ED with 3 days of nausea, diarrhea, and progressive right shoulder pain.  Arthrocentesis in ED concerning for septic joint.  ID consulted and started on antibiotic treatment.  Has had persistent bacteremia so far.  Status post I&D x2, last I&D on 4/30/2022.  Postop complication with new onset A. fib.  Cardiology consulted and started on amiodarone drip now transition to amiodarone p.o.  LOKESH has revealed aortic and mitral vegetations.  Cardiac surgery consulted and recommend valve replacement, however there is concern this is high risk due to patient's frail status.  Family meeting held 5/1/2022, and at this time they are still undecided about surgery but leaning more towards not getting it.  We will consult palliative care to assist with goals of care.        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  * admission leukocytosis of 22 with lactate of 2.4;   * arthrocentesis 4/24 with turbid fluid with Staph aureus on culture; also noted calcium pyrophosphate crystals  * admission blood cultures positive for MSSA  * persistent bacteremia; repeat cultures daily  * TTE withouth vegetations  * Repeat I&D 4/30/22  * LOKESH with aortic and mitral vegetations seen  * CT surgery consult, Care conference had on 05/01to discuss urgent valve replacement; it appears patient and family remain undecided due to patient's frail status but are leaning more towards not getting it.  * ID  following, recommending continue cefazolin.  -ongoing pain.  Has as needed p.o. oxycodone, IV hydromorphone, will add scheduled acetaminophen in attempts to decrease use of narcotics, add adjuvant hydroxyzine scheduled trial, and as pain increased with movement trial as needed cyclobenzaprine.  -Poor surgical candidacy for CorThor intervention, discussed with Dr. Crocker today, Cardiology given severe MR/AI likely continued A. fib RVR difficult to control with 2' rate related ischemia/chest pain.  For now continue amiodarone, use of diltiazem drip or metoprolol limited by hypotension.  Discussed with Palliative Care today to see patient with family to discuss goals of care, poor prognosis per assessment of cardiothoracic surgery, Cardiology.    Possible pneumonia  Admission CXR demonstrating right basilar infiltrates, CT abd/pelvis with groundglass opacities of right base.  Treated for CAP with 7-day course of Ceftin starting 4/1/22, with resolution of productive cough.  Unclear if admission imaging reflects resolved PNA vs new MSSA PNA.   - currently on room air  - Ancef as above    Atrial fibrillation RVR  --New onset developed 4/30/2022 post -op   -- Cardiology consult, see #1.   --Initially on amiodarone drip, now transition to amiodarone p.o.  --Continue to monitor on telemetry.  --Contraindication to anticoagulation at this time.  -Continue episodes of A. fib RVR, intermittently responds to IV metoprolol.  Limited by soft BP; see ##1.      Acute blood loss anemia  -Hemoglobin generally stable nines.  -CBC in AM.     Urinary retention  Noted to be retaining urine with nearly 1000 ml out on straight cath.    * very difficult catheterization requiring 8 Fr cath following admission  - unfortunately, carpenter was removed in PACU 4/25  - per Urology, follow PVR and straight cath with 8Fr if >500     Right upper pole kidney hypoattenuation-concern for infarction  No back or flank pain reported.  UA not congruent with  infection.  Finding was noted on CT incidentally.  Renal US 4/24 wnl; lesion on CT not appreciated sonographically.     Nausea, vomiting, and diarrhea  Correlating with progressive worsening of right shoulder, possible gastritis on CT.  No bloody or black BM reported.  - No further nausea, emesis.  However due to CT findings start IV PPI.  CBC in AM.     Hyponatremia, resolved  Admission Na 132, suspect hypovolemic.  Normalized with IVF.      Chronic conditions  Osteoporosis  FRANCESCO  Hyperlipidemia: continue PTA statin  History of colon cancer s/p right hemicolectomy 8/2017; in remission            Diet: Regular Diet Adult  Snacks/Supplements Adult: Ensure Enlive; Between Meals  Room Service    DVT Prophylaxis: Pneumatic Compression Devices  Coley Catheter: PRESENT, indication: Strict 1-2 Hour I&O  Central Lines: None  Cardiac Monitoring: None  Code Status: Full Code      Disposition Plan   Expected Discharge: TBD in complex patient pending clinical improvement versus outcome of Palliative Care consult.    Denis Myers MD  Hospitalist Service  [facility]  Securely message with the Vocera Web Console (learn more here)  Text page via [employer] Paging/Directory     Total unit/floor time 35 minutes:  time consisted of the following, examination of patient, review of records including labs, imaging results, medications, interdisciplinary notes and completing documentation; > 50% Counseling/discussion with Patient regarding current condition including pain mgmt and Coordination of Care time with Nursing and Specialists, Palliative Care re: goals of care and Cardiology re: AR/MR; a fib RVR  care plan, management and surveillance.      Clinically Significant Risk Factors Present on Admission                   ______________________________________________________________________    Interval History   On morning encounters patient denies pain, per nursing had been receiving as needed p.o. oxycodone.   Denies chest pain.    Data reviewed today: I reviewed all medications, new labs and imaging results over the last 24 hours.    Physical Exam   Vital Signs: Temp: 98  F (36.7  C) Temp src: Axillary BP: 99/53 Pulse: (!) 157   Resp: 16 SpO2: 92 % O2 Device: Nasal cannula Oxygen Delivery: 3 LPM  Weight: 137 lbs 1.6 oz  General Appearance:   NAD, more alert, calm, cooperative    Respiratory:  respirations nonlabored on RA.  Cardiovascular: A. Fib rate 80s  GI: Soft, nontender.  No rebound, guarding or peritoneal signs.  Musculoskeletal, right shoulder dressed.  ROM not tested  Neuro.  Gross motor tested, nonfocal, sensory intact  Psych oriented, affect calm     Data   Recent Labs   Lab 05/03/22  0606 05/01/22  1028 05/01/22  0836 05/01/22  0418 04/30/22  1726   WBC 16.0* 18.1*  --   --  21.5*   HGB 8.6* 9.1*  --   --  9.0*   MCV 93 92  --   --  95    290  --   --  257     --   --  140 138   POTASSIUM 3.6  --   --  4.1 3.7   CHLORIDE 109  --   --  111* 108   CO2 25  --   --  21 20   BUN 14  --   --  11 10   CR 0.61  --   --  0.64 0.54   ANIONGAP 4  --   --  8 10   MYRIAM 8.0*  --   --  6.8* 6.9*   *  --  96 98 110*   ALBUMIN  --   --   --  1.4*  --    PROTTOTAL  --   --   --  4.5*  --    BILITOTAL  --   --   --  0.2  --    ALKPHOS  --   --   --  125  --    ALT  --   --   --  30  --    AST  --   --   --  39  --

## 2022-05-04 NOTE — PROGRESS NOTES
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 Course isfurther complicated by cardiac arrythmia with concern for valve abscess     -Aortic valve endocarditis with 1cm mass on the left coronary leaflet with aortic regurgitation, and small vegetation on the mitral valve. Surgery is indicated but she is a poor surgical candidates and ma transition to hospice   -Chest pain and atrial fibrillation with RVR related to complications of endocarditis . With persistent arrythmia concerned about valvular abscess  -High grade MSSA bacteremia  -Right shoulder septic arthritis with hx of steroid injection prior to onset of symptoms. S/p arthroscopic debridement with uncontrolled infection  -Ongoing leukocytosis and CRP elevation  -Right renal infarct  -Recent pneumonia treated with oral antibiotics. CT shows bilateral ground glass opacities. Resolving pneumonia vs staph pneumonia as source for sepsis  -Systolic murmur  -Pseudogout with positive synovial fluid crystals for calcium pyrophosphate  -chronic medical conditions - Hx of colon cancer, osteoporosis, microscopic colitis, hyperlipidemia     Recommendations  1. Goals of care discussed with Tamar masha. She seems to be failing medical therapy. Worsening dyspnea and WBC, worrisome for valvular abscess. She is not a surgical candidate. Antibiotics alone would not resolve this infection  2. Continue Cefazolin  3. At risk for additional complications of endocarditis , embolizatioin, CVA, cardiac arrythmias  4. Poor candidate for valve replacement surgery  5. Palliative care following    Enedina Nathan MD, MD    Interval History   Doing poorly, remains very short of breath, complains of pain all over, WBC remains elevated seems to be failing medical  therapy    Physical Exam   Temp: 98.9  F (37.2  C) Temp src: Axillary BP: 134/57 Pulse: (!) 134   Resp: 24 SpO2: 95 % O2 Device: Nasal cannula Oxygen Delivery: 3 LPM  Vitals:    05/02/22 0500 05/03/22 0405 05/04/22 0618   Weight: 62.3 kg (137 lb 5.6 oz) 62.2 kg (137 lb 1.6 oz) 64.9 kg (143 lb 1.6 oz)     Vital Signs with Ranges  Temp:  [97.8  F (36.6  C)-98.9  F (37.2  C)] 98.9  F (37.2  C)  Pulse:  [] 134  Resp:  [16-28] 24  BP: ()/(41-74) 134/57  SpO2:  [90 %-100 %] 95 %    Constitutional: Awake, alert, cooperative, no apparent distress  Lungs: diminished at bases  Cardiovascular: S1S2 loud systolic murmur  Abdomen: Normal bowel sounds, soft, non-distended, non-tender  Skin: No rashes, no cyanosis, no edema    Other:    Medications       acetaminophen  650 mg Oral 4x Daily     amiodarone  200 mg Oral BID     [START ON 5/16/2022] amiodarone  200 mg Oral Daily     aspirin  81 mg Oral BID     ceFAZolin  2 g Intravenous Q8H     hydrOXYzine  25 mg Oral TID     pantoprazole (PROTONIX) IV  40 mg Intravenous Daily with breakfast     polyethylene glycol  17 g Oral Daily     rosuvastatin  20 mg Oral Daily     senna-docusate  1 tablet Oral BID    Or     senna-docusate  2 tablet Oral BID     sodium chloride (PF)  3 mL Intracatheter Q8H       Data   All microbiology laboratory data reviewed.  Recent Labs   Lab Test 05/04/22  0624 05/03/22  0606 05/01/22  1028   WBC 16.9* 16.0* 18.1*   HGB 8.9* 8.6* 9.1*   HCT 27.2* 26.2* 27.4*   MCV 94 93 92    340 290     Recent Labs   Lab Test 05/04/22  0624 05/03/22  0606 05/01/22  0418   CR 0.68 0.61 0.64

## 2022-05-04 NOTE — PLAN OF CARE
Cognition/Mentation: A/O, forgetful    Neuro/CMS: intact    VS: stable, 3L NC    Cardiac: NSR, denies CP    Respiratory: LS diminished    GI/: carpenter for retention    Activity: turn/repo overnight    Pain: PRN oxycodone given for right shoulder pain    Drains/Tubes: PIV SL, carpenter    Skin: dressing to right shoulder CDI, mepilex to left elbow    Disposition: pending

## 2022-05-04 NOTE — PLAN OF CARE
Goal Outcome Evaluation: Pt is disoriented to time, forgetful, VSS except IV Metoprolol 5 mg given for  A-Fib RVR and pt converted to SR, on 3 L NC, ice pack to R shoulder, Tylenol 650 mg and Atarax  25 mg and Cefazolin 2g/100 ml given, poor appetite. Plan to monitor, palliative consult.    Plan of Care Reviewed With: patient, daughter     Overall Patient Progress: no change

## 2022-05-04 NOTE — PROGRESS NOTES
[facility]    Medicine Progress Note - Hospitalist Service    Date of Admission:  4/24/2022    Assessment & Plan   Tamar Rose is a 87 year old female admitted on 4/24/2022.  Past history of colon cancer, hyperlipidemia, osteoporosis, microscopic colitis, and iron deficiency anemia who presented to the ED with 3 days of nausea, diarrhea, and progressive right shoulder pain.  Arthrocentesis in ED concerning for septic joint.  ID consulted and started on antibiotic treatment.  Has had persistent bacteremia so far.  Status post I&D x2, last I&D on 4/30/2022.  Postop complication with new onset A. fib.  Cardiology consulted and started on amiodarone drip now transition to amiodarone p.o.  LOKESH has revealed aortic and mitral vegetations.  Cardiac surgery consulted and recommend valve replacement, however there is concern this is high risk due to patient's frail status.  Family meeting held 5/1/2022, and at this time they are still undecided about surgery but leaning more towards not getting it.  We will consult palliative care to assist with goals of care.        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  * admission leukocytosis of 22 with lactate of 2.4;   * arthrocentesis 4/24 with turbid fluid with Staph aureus on culture; also noted calcium pyrophosphate crystals  * admission blood cultures positive for MSSA  * persistent bacteremia; repeat cultures daily  * TTE withouth vegetations  * Repeat I&D 4/30/22  * LOKESH with aortic and mitral vegetations seen  * CT surgery consult, Care conference had on 05/01to discuss urgent valve replacement; it appears patient and family remain undecided due to patient's frail status but are leaning more towards not getting it.  * ID  following, recommending continue cefazolin.  -ongoing pain.  Has as needed p.o. oxycodone, IV hydromorphone, will add scheduled acetaminophen in attempts to decrease use of narcotics, add adjuvant hydroxyzine scheduled trial, and as pain increased with movement trial as needed cyclobenzaprine.  Pain controlled with current regimen.  No excess sedation.  -Poor surgical candidacy for CorThor intervention, discussed with Dr. Crocker, Cardiology given severe MR/AI likely continued A. fib RVR difficult to control with 2' rate related ischemia/chest pain.  For now continue amiodarone, use of diltiazem drip or metoprolol limited by hypotension.  Discussed with Palliative Care saw patient with family to discuss goals of care 5/26/2022 given poor prognosis per assessment of cardiothoracic surgery, Cardiology.  See Palliative care note, for now daughter wishes to continue current regimen.  Follow-up call to Palliative Care today 5/4/2022 reiterating Cardiology opinion of poor prognosis given significant thrombus load on aortic and mitral valve, nonsurgical candidate.  Discussed with Dr. Crocker Cardiology who reiterated poor prognosis and stated she discussed with Palliative care.  Palliative care to discuss with patient/daughter.      Possible pneumonia  Admission CXR demonstrating right basilar infiltrates, CT abd/pelvis with groundglass opacities of right base.  Treated for CAP with 7-day course of Ceftin starting 4/1/22, with resolution of productive cough.  Unclear if admission imaging reflects resolved PNA vs new MSSA PNA.   - currently on room air  - Ancef as above    Atrial fibrillation RVR  --New onset developed 4/30/2022 post -op   -- Cardiology consult, see #1.   --Initially on amiodarone drip, now transition to amiodarone p.o.  --Continue to monitor on telemetry.  --Contraindication to anticoagulation at this time.  -Continue episodes of A. fib RVR, fortunately BP has permitted PRN IV metoprolol, currently rate  controlled.  As above poor prognosis with thrombus atrial aortic valve, nonsurgical.       Acute blood loss anemia  -Hemoglobin generally stable nines.  -CBC in AM.     Urinary retention  Noted to be retaining urine with nearly 1000 ml out on straight cath.    * very difficult catheterization requiring 8 Fr cath following admission  - unfortunately, carpenter was removed in PACU 4/25  - per Urology, follow PVR and straight cath with 8Fr if >500     Right upper pole kidney hypoattenuation-concern for infarction  No back or flank pain reported.  UA not congruent with infection.  Finding was noted on CT incidentally.  Renal US 4/24 wnl; lesion on CT not appreciated sonographically.     Nausea, vomiting, and diarrhea  Correlating with progressive worsening of right shoulder, possible gastritis on CT.  No bloody or black BM reported.  - No further nausea, emesis.  However due to CT findings start IV PPI.  CBC in AM.     Hyponatremia, resolved  Admission Na 132, suspect hypovolemic.  Normalized with IVF.      Chronic conditions  Osteoporosis  FRANCESCO  Hyperlipidemia: continue PTA statin  History of colon cancer s/p right hemicolectomy 8/2017; in remission            Diet: Regular Diet Adult  Room Service  Snacks/Supplements Adult: Ensure Enlive; Between Meals    DVT Prophylaxis: Pneumatic Compression Devices  Carpenter Catheter: PRESENT, indication: Retention  Central Lines: None  Cardiac Monitoring: None  Code Status: Full Code      Disposition Plan   Expected Discharge: TBD in complex patient pending clinical improvement versus outcome of Palliative Care consult.    Denis Myers MD  Hospitalist Service  [facility]  Securely message with the Vocera Web Console (learn more here)  Text page via [employer] Paging/Directory     Total unit/floor time 35 minutes:  time consisted of the following, examination of patient, review of records including labs, imaging results, medications, interdisciplinary notes and  completing documentation; > 50% Counseling/discussion with Patient regarding current condition including pain management and Coordination of Care time with Nursing pain, A. fib RVR, endocarditis and Specialists, palliative Care, cardiology regarding overall care plan with poor prognosis.      Clinically Significant Risk Factors Present on Admission                   ______________________________________________________________________    Interval History   Patient appeared more alert today, denies pain although nursing stated she just received oxycodone.  BP permissive for IV metoprolol as needed for A. fib RVR.  Generally responds.    Data reviewed today: I reviewed all medications, new labs and imaging results over the last 24 hours.    Physical Exam   Vital Signs: Temp: 98.9  F (37.2  C) Temp src: Axillary BP: 107/55 Pulse: 85   Resp: 20 SpO2: 99 % O2 Device: Nasal cannula Oxygen Delivery: 3 LPM  Weight: 143 lbs 1.6 oz  General Appearance:   NAD, alert, calm, cooperative      Respiratory:  respirations nonlabored on RA.  Cardiovascular: A. Fib rate 80s  GI: Soft, nontender.  No rebound, guarding or peritoneal signs.  Musculoskeletal, right shoulder dressed.  ROM not tested  Neuro.  Gross motor tested, nonfocal, sensory intact  Psych oriented, affect calm     Data   Recent Labs   Lab 05/04/22  0624 05/03/22  0606 05/01/22  1028 05/01/22  0836 05/01/22  0418   WBC 16.9* 16.0* 18.1*  --   --    HGB 8.9* 8.6* 9.1*  --   --    MCV 94 93 92  --   --     340 290  --   --     138  --   --  140   POTASSIUM 3.5 3.6  --   --  4.1   CHLORIDE 108 109  --   --  111*   CO2 28 25  --   --  21   BUN 12 14  --   --  11   CR 0.68 0.61  --   --  0.64   ANIONGAP 2* 4  --   --  8   MYRIAM 8.0* 8.0*  --   --  6.8*   * 119*  --  96 98   ALBUMIN  --   --   --   --  1.4*   PROTTOTAL  --   --   --   --  4.5*   BILITOTAL  --   --   --   --  0.2   ALKPHOS  --   --   --   --  125   ALT  --   --   --   --  30   AST  --   --    --   --  39

## 2022-05-04 NOTE — PROGRESS NOTES
"SPIRITUAL HEALTH SERVICES  SPIRITUAL ASSESSMENT Progress Note  [facility]    ILLNESS CIRCUMSTANCES:   Saw Tamar per follow up request by patient.  Assessed emotional/spiritual needs and resources while offering reflective conversation, which integrated elements of illness and family narratives.     Context of Serious Illness/Symptom(s) - came to the hospital due to infection in her shoulder and later diagnosed with heart valve issues    Persons/Resources Involved - Family (daughter, son, grandchildren, ex-); krystal community ([facility] in [place]); friends    DISTRESS:     Emotional/Existential/Relational Distress -     Tamar named her wondering related to her hopes around goals of care and the need to decide whether to pursue restorative versus comfort focused care. \"How much suffering does a person want to have to go through?\"     She reflected on her quality of life until recently \"I was able to cook, bake, care for myself and my house. Now I feel so weak.\" After asking for care team support for daily tasks she said \"I feel like an infant.\"    Spiritual/Cheondoism Distress - Not named    Social/Cultural/Economic Distress - Tamar expressed her concern of not being a burden to her family    SPIRIT (Coping):     Buddhism/Krystal      \"I believe that God will forgive me and that God loves me.\"    Tamar shared that she was going to speaking to her ex- who is \"very involved in the [employer]\" and would ask him to contact the [employer] regarding her hospitalization.    Spiritual Practice(s) - prayer    Emotional/Existential/Relational Connections     Tamar spoke to her daughter by phone earlier, saying \"my family says they will support me in whatever I decide\" about restorative vs comfort care.    SENSE-MAKING:    Goals of Care - Tamar expressed her desire to \"take another day or two to decide.\"     Meaning/Sense-Making - \"I know that I'll see my family again.\"    I offered spiritual and " emotional support through reflective listening that affirmed emotions, experience, and meaning. Offered assurance through prayer which incorporated conversational themes and we said the Lord's Prayer together. Tamar welcomed an olive wood palm cross.    PLAN: Delivered an olive wood palm cross. Will follow up in 1-2 days. [facility] remains available for support.    Reyna Vasquez  Associate , Jania  [phone] (pager)

## 2022-05-04 NOTE — PLAN OF CARE
Care plan note:      Recent Vitals:  Temp: 98.9  F (37.2  C) Temp src: Axillary BP: 107/55 Pulse: 85   Resp: 20 SpO2: 99 % O2 Device: Nasal cannula       Orientation/Neuro: Alert and Oriented x 4  Pain: Pain is better controlled after receiving AM dilaudid. Crying and uncomfortable with any reposition. Medication(s) being used: tylenol, atarax, oxycodone and dilaudid for breakthrough pain.               Tele: Sinus rhythm  and Atrial fibrillation - rapid- received PRn IV metoprolol x1  for sustained HR greater than 140              IV medications: IV antibiotics              Mobility: Assist of 2- bedrest r/t pain              Skin: rt shoulder wound- dressing intact              GI:  WDL  : Coley Catheter                 Diet: Tolerating diet:  Needs encouragement to eat  Orders Placed This Encounter      Regular Diet Adult                 Safety/Concerns:  Fall Risk and Dion Risk  Aggression Color: Green     Plan: Dtr called for update, left message but unable to get in contact.  Pt expressed understanding that their is not much else to do medically. palliative following.  Pt heart rhythm in between A-fib RVR and SR. Low BP between  systolic.               Continue to monitor.        Cassie Cantu RN

## 2022-05-05 NOTE — PLAN OF CARE
Goal Outcome Evaluation:   A&O x4, VSS ex tachy HR. Tele Afib w/ RVR.  Gave prn metoprolol for HR>140s with result to 90 to 100s. SOB relieved with prn neb. Denies palpitations/CP. R shoulder pain managed with prn oxycodone. Will continue to monitor.

## 2022-05-05 NOTE — PLAN OF CARE
Care plan note:      Recent Vitals:  Temp: 98.4  F (36.9  C) Temp src: Axillary BP: 120/65 Pulse: 106   Resp: 24 SpO2: 92 % O2 Device: Nasal cannula     Orientation/Neuro: Alert and Oriented x 4  Pain: Pain control switch to PO dilaudid.  Continues with sig pain with movement.               Tele: Sinus rhythm - did not require PRN metoprolol for rapid rates this shift.               IV medications: IV antibiotics              Mobility: Assist of 2- bedrest r/t pain              Skin: rt shoulder wound- dressing intact              GI:  WDL  : Coley Catheter                 Diet: Tolerating diet:  Needs encouragement to eat  Orders Placed This Encounter      Regular Diet Adult                 Safety/Concerns:  Fall Risk and Dion Risk  Aggression Color: Green     Plan: palliative and ID following. repeat cxray and echo,  goals of care discussions in progress.         Continue to monitor.        Cassie Cantu RN

## 2022-05-05 NOTE — PROGRESS NOTES
[facility]    Medicine Progress Note - Hospitalist Service    Date of Admission:  4/24/2022    Assessment & Plan   Tamar Rose is a 87 year old female admitted on 4/24/2022.  Past history of colon cancer, hyperlipidemia, osteoporosis, microscopic colitis, and iron deficiency anemia who presented to the ED with 3 days of nausea, diarrhea, and progressive right shoulder pain.  Arthrocentesis in ED concerning for septic joint.  ID consulted and started on antibiotic treatment.  Has had persistent bacteremia so far.  Status post I&D x2, last I&D on 4/30/2022.  Postop complication with new onset A. fib.  Cardiology consulted and started on amiodarone drip now transition to amiodarone p.o.  LOKESH has revealed aortic and mitral vegetations.  Cardiac surgery consulted and recommend valve replacement, however there is concern this is high risk due to patient's frail status.  Family meeting held 5/1/2022, and at this time they are still undecided about surgery but leaning more towards not getting it.  We will consult palliative care to assist with goals of care.        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  * admission leukocytosis of 22 with lactate of 2.4;   * arthrocentesis 4/24 with turbid fluid with Staph aureus on culture; also noted calcium pyrophosphate crystals  * admission blood cultures positive for MSSA  * persistent bacteremia; repeat cultures daily  * TTE withouth vegetations  * Repeat I&D 4/30/22  * LOKESH with aortic and mitral vegetations seen  * CT surgery consult, Care conference had on 05/01to discuss urgent valve replacement; it appears patient and family remain undecided due to patient's frail status but are leaning more towards not getting it.  * ID  following, recommending continue cefazolin.  -ongoing pain.  Has as needed p.o. oxycodone, IV hydromorphone, will add scheduled acetaminophen in attempts to decrease use of narcotics, add adjuvant hydroxyzine scheduled trial, and as pain increased with movement trial as needed cyclobenzaprine.  Pain controlled with current regimen.  No excess sedation.  -Poor surgical candidacy for CorThor intervention, discussed with Dr. Crocker, Cardiology given severe MR/AI likely continued A. fib RVR difficult to control with 2' rate related ischemia/chest pain.  For now continue amiodarone, use of diltiazem drip or metoprolol limited by hypotension.  Discussed with Palliative Care saw patient with family to discuss goals of care 5/3/2022 given poor prognosis per assessment of cardiothoracic surgery, Cardiology.  See Palliative care note, for now daughter wishes to continue current regimen.  Follow-up call to Palliative Care  5/4/2022 reiterating Cardiology opinion of poor prognosis given significant thrombus load on aortic and mitral valve, nonsurgical candidate.  Discussed with Dr. Crocker Cardiology who reiterated poor prognosis and stated she discussed with Palliative care; no Pall Care Note 5/4/22  -5/5/22 Pall Care discussion with Patient and 2 [place] pending; it appears that patient is ambivalent regarding goals of care, she is concerned regarding son.  Daughter Viktoriya appears to be major decision maker and upon last Pall Care visit wanted continued restorative care.  Although patient is leaning towards care directed at comfort as she is aware that her condition/septic endocarditis is nonsurgical she remains ambivalent and wants to wait upon discussion with daughter Cara later today re: goals of care.    Possible pneumonia  Admission CXR demonstrating right basilar infiltrates, CT abd/pelvis with groundglass opacities of right base.  Treated for CAP with 7-day course of Ceftin starting 4/1/22, with resolution of productive  cough.  Unclear if admission imaging reflects resolved PNA vs new MSSA PNA.   - currently on room air  - Ancef as above    Atrial fibrillation RVR  --New onset developed 4/30/2022 post -op   -- Cardiology consult, see #1.   --Initially on amiodarone drip, now transition to amiodarone p.o.  --Continue to monitor on telemetry.  --Contraindication to anticoagulation at this time.  -Continue episodes of A. fib RVR, fortunately BP has permitted PRN IV metoprolol, currently rate controlled.  As above poor prognosis with thrombus atrial aortic valve, nonsurgical.       Acute blood loss anemia  -Hemoglobin generally stable nines.  -CBC in AM.     Urinary retention  Noted to be retaining urine with nearly 1000 ml out on straight cath.    * very difficult catheterization requiring 8 Fr cath following admission  - unfortunately, carpenter was removed in PACU 4/25  - per Urology, follow PVR and straight cath with 8Fr if >500     Right upper pole kidney hypoattenuation-concern for infarction  No back or flank pain reported.  UA not congruent with infection.  Finding was noted on CT incidentally.  Renal US 4/24 wnl; lesion on CT not appreciated sonographically.     Nausea, vomiting, and diarrhea  Correlating with progressive worsening of right shoulder, possible gastritis on CT.  No bloody or black BM reported.  - No further nausea, emesis.  However due to CT findings start IV PPI.  CBC in AM.     Hyponatremia, resolved  Admission Na 132, suspect hypovolemic.  Normalized with IVF.      Chronic conditions  Osteoporosis  FRANCESCO  Hyperlipidemia: continue PTA statin  History of colon cancer s/p right hemicolectomy 8/2017; in remission       Diet: Regular Diet Adult  Room Service  Snacks/Supplements Adult: Ensure Enlive; Between Meals    DVT Prophylaxis: Pneumatic Compression Devices  Carpenter Catheter: PRESENT, indication: Retention  Central Lines: None  Cardiac Monitoring: None  Code Status: Full Code      Disposition Plan   Expected  Discharge: TBD in complex patient pending clinical improvement versus outcome of Palliative Care consult/goals of care.    Denis Myers MD  Hospitalist Service  [facility]  Securely message with the Vocera Web Console (learn more here)  Text page via [employer] Paging/Directory     Total unit/floor time 35 minutes:  time consisted of the following, examination of patient, review of records including labs, imaging results, medications, interdisciplinary notes and completing documentation; > 50% Counseling/discussion with Patient regarding current condition including pain management and Coordination of Care time with Nursing goals of care and Specialists, Cardiology, Palliative Care regarding care plan, management and surveillance.      Clinically Significant Risk Factors Present on Admission                   ______________________________________________________________________    Interval History   Patient appears tired today, denies pain.  No dyspnea, palpitations.     Data reviewed today: I reviewed all medications, new labs and imaging results over the last 24 hours.    Physical Exam   Vital Signs: Temp: 98.5  F (36.9  C) Temp src: Oral BP: 127/60 Pulse: 102   Resp: 24 SpO2: 94 % O2 Device: Nasal cannula Oxygen Delivery: 2 LPM  Weight: 145 lbs 0 oz  General Appearance:   NAD, appears tired; calm, cooperative.  Respiratory:  respirations nonlabored on RA.  Cardiovascular: A. Fib rate 95  GI: Soft, nontender.  No rebound, guarding or peritoneal signs.  Musculoskeletal, right shoulder dressed.  ROM not tested  Neuro.  Gross motor tested, nonfocal, sensory intact  Psych oriented, affect tired, calm     Data   Recent Labs   Lab 05/04/22  0624 05/03/22  0606 05/01/22  1028 05/01/22  0836 05/01/22  0418   WBC 16.9* 16.0* 18.1*  --   --    HGB 8.9* 8.6* 9.1*  --   --    MCV 94 93 92  --   --     340 290  --   --     138  --   --  140   POTASSIUM 3.5 3.6  --   --  4.1   CHLORIDE  108 109  --   --  111*   CO2 28 25  --   --  21   BUN 12 14  --   --  11   CR 0.68 0.61  --   --  0.64   ANIONGAP 2* 4  --   --  8   MYRIAM 8.0* 8.0*  --   --  6.8*   * 119*  --  96 98   ALBUMIN  --   --   --   --  1.4*   PROTTOTAL  --   --   --   --  4.5*   BILITOTAL  --   --   --   --  0.2   ALKPHOS  --   --   --   --  125   ALT  --   --   --   --  30   AST  --   --   --   --  39

## 2022-05-05 NOTE — PLAN OF CARE
Goal Outcome Evaluation: Pt is A&Ox4, forgetful, VSS except IV for HR switching back and forth A-Fib RVR and SR, on 2 L NC, ice pack to R shoulder, Tylenol 650 mg and Atarax  25 mg and Cefazolin 2g/100 ml given, poor appetite. Plan to continue Cefazolin 2 mg/100 ml every 8 hrs and possible  palliative consult.    Plan of Care Reviewed With: patient, daughter     Overall Patient Progress: no change

## 2022-05-05 NOTE — PROGRESS NOTES
[facility]  Palliative Care Daily Progress Note       Recommendations & Counseling       Reviewed current medical status and fact that there are no good treatment options for Tamar's medical condition.  Comfort care is recommended to allow her to be as comfortable as possible with her symptoms of pain .  The benefits and burdens of CPR were reviewed.    Tamar is considering change of code status and comfort focused care as there are no good treatment options for her endocarditis and valve damage. She wishes to speak with her daughter Viktoriya and son Osman this evening regarding her decisions.  Palliative care will check in with Tamar and Emy tomorrow to see if they have any questions or concerns.  Continue current cares at this time.    Tamar prefers to make major decisions together with her daughter's input.    Pain of shoulders, chest.  Was taking 5 mg of oxycodone every 4 hours however daughter states that patient reacts badly to oxycodone and sometimes has hallucinations.  Discussed alternate medications we could try such as Dilaudid.  Patient and daughter are in agreement.  -Start hydromorphone 1 through 2 mg orally every 4 hours as needed for moderate to severe pain.  -Continue hydromorphone 0.2 through 0.4 mg IV every 2 hours as needed for severe pain if unable to tolerate oral hydromorphone.  -Discontinue hydroxyzine due to high risk of delirium.  Patient stating she has been seeing strange visual images on the wall.       High risk for delirium related to medications, severe illness, infection, hospital.  -Maintain day/night routines and orientation.  -Avoid medications such as steroids, benzodiazepines and anticholinergics.   -Optimize hydration/nutrition, and sleep.  -Utilize sensory aids to maintain orientation such as eye glasses, hearing aids or pocket talkers.  -Calm environment, quiet/reassuring voices, orienting cues, minimized noise/night disruptions, when possible.   -Maximize  mobility and prevent/treat pain.    Case was reviewed with Dr. Myers, and patient's RN Cassie Pennington, [facility]  Contact information available via [facility] Paging/Directory        Thank you for the opportunity to participate in the care of this patient and family. Our team: will continue to follow.     During regular M-F work hours ([id number]) -- if you are not sure who specifically to contact -- please contact us in [facility] Smart Web.     After regular work hours and on weekends/holidays, you can call our answering service at [phone].     Attestation:  Total time on the floor involved in the patient's care: 35 minutes  Total time spent in counseling/care coordination, symptom assessment, goals of care,: >50%    Time in addition to above E/M time with patient contact was 30 minutes in room with patient and relatives from 9 AM through 9:30 AM discussing current condition, goals of care, comfort focused care, hospice services.    Time in addition to above E/M time without patient contact was 20 minutes from 9:45 AM through 10:15 AM on the phone with patient's daughter Viktoriya discussing current condition, goals of care, comfort focused care, hospice services.    Total time 85 minutes   Assessments          Tamar Rose is a 87 year old female with PMH significant for osteoporosis, arthritis, colon cancer, microscopic colitis, iron deficiency anemia.  She presented to ED on 4/24/2022 for severe shoulder pain status post cortisone injection, ultimately leading to nausea, and diarrhea.  She underwent 2 I&D's of her shoulder and found to have MSSA with bacteremia.  She developed postop complications with new onset A. fib.  LOKESH discovered aortic and mitral valve vegetations.  Cardiac surgery evaluated and recommended valve replacement however due to patient's poor functional status and age she would be at high risk for poor outcomes and quality of life post  surgery.  Patient and family have decided to forego the surgery at this time.       Today, the patient was seen for:  Goals of care, pain, symptom management, CODE STATUS discussion.    Prognosis, Goals, or Advance Care Planning was addressed today with: Yes.  Mood, coping, and/or meaning in the context of serious illness were addressed today: Yes.  Summary/Comments: Tamar had a good conversation with the palliative  which she enjoyed very much.            Interval History:     Chart review/discussion with unit or clinical team members:   Good patient case with patient's RN and Dr. Myers.    Per patient or family/caregivers today:  Long discussion with daughter Viktoriya on the phone she is unable to come in in person due to work commitments.  We reviewed Tamar's medical condition and no would options for treatment; she has a good understanding of the medical situation but wants her mother to come to a decision regarding CODE STATUS and option of comfort care.. Viktoriya is unable to come in to hospital but would like me to discuss with Tamar and simon, who are currently in Tamar's room.   [place] discussion with Tamar, her nieces Kyra and Danielle in room.  Reviewed that he is endocarditis and damage to heart valves-how valve replacement surgery would be the recommendation however due to her functional status she is not a good candidate.  IV antibiotics will not be enough to cure the infection of her heart valves.  In addition there is damage to the heart valves so they are not functioning properly.  Her cardiac function will continue to worsen and this condition is considered terminal.  Reviewed the benefits and burdens of CPR given her very fragile medical status and if she survive CPR would likely be on life support machines with no hope of improvement.  Tamar is considering the information but would like to have her daughter come in this evening to discuss with her before making a final decision.  We also reviewed  the concept of comfort care. It was explained to patient and family that comfort care is a good option when the there are no treatments available for a serious, terminal illness. Comfort care focuses on optimizing quality of life and relief from distressing symptoms such as pain, shortness of breath, nausea, and anxiety and allowing a natural dying process. When comfort care is initiated, restorative focused care and all artificial means to sustain life are discontinued, including further diagnostic testing, lab draws, blood glucose testing/insulin,  IV fluids, antibiotics, medications not for comfort, and vital signs. We could improve her symptom management for pain control on comfort focused care.  And there would be more visitors allowed here at the hospital with 6/day, 2 at a time.  Once comfort care is initiated we would see how Tamar's condition stabilizes and if stable could discharge to an outpatient hospice setting at a facility as loli Brenner states that the family would be unable to provide in-home care for hospice.     Tamar will consider this information and wishes to discuss with loli Sadler this evening.  I encouraged Viktoriya to call up her brother Osman to participate in the discussion as well as this seemed important to Tamar.  Tamar wonders how long she can stay on IV antibiotics here at the hospital and I explained that there is no stop date for this but the antibiotics but remind her that the antibiotics will not cure her endocarditis infection.  Palliative care will check in with patient and daughter Emy tomorrow to see if they have any questions following their discussion this evening.    Key Palliative Symptoms:  # Pain severity the last 12 hours: moderate  # Dyspnea severity the last 12 hours: none  # Nausea severity the last 12 hours: none  # Anxiety severity the last 12 hours: low    Patient is on opioids: assessed and bowels ok/no needed changes to plan of care today.           Review  of Systems:     Besides above, an additional  system ROS was reviewed and is unremarkable          Medications:     I have reviewed this patient's medication profile and medications during this hospitalization.    Noted meds:      acetaminophen  975 mg Oral TID     amiodarone  200 mg Oral BID     [START ON 5/16/2022] amiodarone  200 mg Oral Daily     aspirin  81 mg Oral BID     ceFAZolin  2 g Intravenous Q8H     [Held by provider] hydrOXYzine  25 mg Oral TID     pantoprazole (PROTONIX) IV  40 mg Intravenous Daily with breakfast     polyethylene glycol  17 g Oral Daily     rosuvastatin  20 mg Oral Daily     senna-docusate  1 tablet Oral BID    Or     senna-docusate  2 tablet Oral BID     sodium chloride (PF)  3 mL Intracatheter Q8H     acetaminophen **OR** acetaminophen, albuterol, sore throat lozenge, bisacodyl, calcium carbonate, cyclobenzaprine, HYDROmorphone, HYDROmorphone, lidocaine 4%, lidocaine (buffered or not buffered), magnesium hydroxide, melatonin, metoprolol, naloxone **OR** naloxone **OR** naloxone **OR** naloxone, nitroGLYcerin, ondansetron **OR** ondansetron, polyethylene glycol, prochlorperazine **OR** prochlorperazine, sodium chloride (PF)               Physical Exam:   Temp: 98.5  F (36.9  C) Temp src: Oral BP: 127/60 Pulse: 102   Resp: 24 SpO2: 92 % O2 Device: Nasal cannula Oxygen Delivery: 2 LPM  GENERAL:  Alert, fatigued, no distress, .  HEAD: Normocephalic atraumatic  SCLERA: Anicteric  EXTREMITIES: Warm; no edema; bilateral LE bruising.  ABDOMEN:  Soft, nontender  RESPIRATORY: Breathing relaxed and non labored.  NEUROLOGIC: Alert, some visual hallucinations.  PSYCH: Calm, cooperative.           Data Reviewed:     Recent imaging reviewed, my comments on pertinents:   Results for orders placed or performed during the hospital encounter of 04/24/22   CT Abdomen Pelvis w Contrast    Impression    IMPRESSION:   1.  Wedge-shaped area of hypoattenuation the upper pole of the right kidney. Given  morphology, favor evolving infarction but consider correlation with urinalysis.  2.  Possible gastritis involving the distal stomach.  3.  Bibasilar groundglass pulmonary opacities, most pronounced in the right lower lobe, can be seen with aspiration and/or pneumonia.  4.  Multiple prior vertebroplasties with additional age-indeterminate compression deformity of L1.   US Renal Complete    Impression    IMPRESSION:  1.  Normal kidney ultrasound. Hypodense lesion upper pole right kidney seen on CT not appreciated sonographically.   XR Chest Port 1 View    Impression    IMPRESSION: Moderate infiltrates within the right lower lung most consistent with pneumonia. Additional mild infiltrates in the left lung base may be related. Normal heart size and pulmonary vascularity.   XR Chest Port 1 View    Impression    IMPRESSION: Previously noted bilateral lower lung infiltrates have improved. There may be a small amount of pleural fluid bilaterally. Heart size is stable. Pulmonary vascularity is within normal limits.   CT Chest Pulmonary Embolism w Contrast    Impression    IMPRESSION:  1.  No evidence for pulmonary embolism.  2.  Smooth interlobular septal thickening in both lungs likely represents pulmonary edema.  3.  Small to moderate bilateral pleural effusions, with mild associated atelectasis at both lung bases.  4.  Mild patchy groundglass opacity at the right lung apex may also be related to edema, although a superimposed infectious process cannot be excluded.   Echocardiogram Complete   Result Value Ref Range    LVEF  65-70%    Echocardiogram LOKESH   Result Value Ref Range    LVEF  60-65%    Echocardiogram Limited   Result Value Ref Range    LVEF  >70%      Lab Results   Component Value Date    WBC 16.9 (H) 05/04/2022    WBC 16.0 (H) 05/03/2022    WBC 18.1 (H) 05/01/2022    HGB 8.9 (L) 05/04/2022    HGB 8.6 (L) 05/03/2022    HGB 9.1 (L) 05/01/2022    HCT 27.2 (L) 05/04/2022    HCT 26.2 (L) 05/03/2022    HCT 27.4 (L)  05/01/2022     05/04/2022     05/03/2022     05/01/2022     05/04/2022     05/03/2022     05/01/2022    POTASSIUM 3.5 05/04/2022    POTASSIUM 3.6 05/03/2022    POTASSIUM 4.1 05/01/2022    CHLORIDE 108 05/04/2022    CHLORIDE 109 05/03/2022    CHLORIDE 111 (H) 05/01/2022    CO2 28 05/04/2022    CO2 25 05/03/2022    CO2 21 05/01/2022    BUN 12 05/04/2022    BUN 14 05/03/2022    BUN 11 05/01/2022    CR 0.68 05/04/2022    CR 0.61 05/03/2022    CR 0.64 05/01/2022     (H) 05/04/2022     (H) 05/03/2022    GLC 96 05/01/2022    NTBNPI 11,044 (H) 05/03/2022    AST 39 05/01/2022    AST 37 04/25/2022    AST 26 04/24/2022    ALT 30 05/01/2022    ALT 38 04/25/2022    ALT 33 04/24/2022    ALKPHOS 125 05/01/2022    ALKPHOS 88 04/25/2022    ALKPHOS 67 04/24/2022    BILITOTAL 0.2 05/01/2022    BILITOTAL 0.6 04/25/2022    BILITOTAL 0.5 04/24/2022

## 2022-05-05 NOTE — PROGRESS NOTES
Orthopedic Surgery  Tamar Rose  2022  Admit Date:  2022  POD: 5 Days Post-Op   Procedure(s):  IRRIGATION AND DEBRIDEMENT, SHOULDER Arthroscopic  RIGHT    Alert and oriented, drowsy .   Patient resting comfortably in bed.    Pain remains in shoulder     Vital Sign Ranges  Temperature Temp  Av.2  F (36.8  C)  Min: 98  F (36.7  C)  Max: 98.4  F (36.9  C)   Blood pressure Systolic (24hrs), Av , Min:96 , Max:142        Diastolic (24hrs), Av, Min:44, Max:83      Pulse Pulse  Av.8  Min: 84  Max: 148   Respirations Resp  Av.1  Min: 16  Max: 28   Pulse oximetry SpO2  Av.2 %  Min: 92 %  Max: 99 %       Dressing is changed to bandaids  Pain with passive motion  Sensation and pulses intact    Labs:  Recent Labs   Lab Test 22  0624 22  0606 22  1028   WBC 16.9* 16.0* 18.1*     Recent Labs   Lab Test 22  0624 22  0606 22  1028   HGB 8.9* 8.6* 9.1*     No results for input(s): INR in the last 36388 hours.  Recent Labs   Lab Test 22  0624 22  0606 22  1028    340 290       1. PLAN:   Continue Abx per ID   WBAT right shoulder   Will continue to trend CRP    Serena Ashley PA-C

## 2022-05-05 NOTE — PROGRESS NOTES
Talked to palliative LILLIAN and Dr. Myers regarding pt and plan of care. Pt reluctant to make a discusion . Went to talk to pt and asked what her understanding is regarding everything the doctors have told her. Pt stated she knows that her condition is terminal.  Expressed that writer wanted to ensure she was understadning what it means and that there is concerns from the doctors that at some point  Her heart could stop and recessitation measure could put her in a lot of pain.  Discussed that pt already having a lot of pain and having a hard time controlling it. Pt acknowledged this.  Pt stated she did not know how her son was going to manage with out her. nieces at bedside and were encouraging that she has helped them so much and greatful for their time together and that Viktoriya would be checking in on Osman.  Pt acknowledged and stated she would let herself pass if it came to that. Pt willing to discuss DNR/DNI when dtr is at bedside tonight. Paged Dr. Myers and palliative to update.

## 2022-05-05 NOTE — PROGRESS NOTES
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 Course  complicated by cardiac arrythmia with concern for valve abscess. 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 Surgery is indicated but she is a poor surgical candidates   -Chest pain and atrial fibrillation with RVR related to complications of endocarditis . With persistent arrythmia concerned about valvular abscess  -High grade MSSA bacteremia  -Right shoulder septic arthritis with hx of steroid injection prior to onset of symptoms. S/p arthroscopic debridement with uncontrolled infection  -Ongoing leukocytosis and CRP elevation  -Right renal infarct  -Recent pneumonia treated with oral antibiotics. CT shows bilateral ground glass opacities. Resolving pneumonia vs staph pneumonia as source for sepsis  -Systolic murmur  -Pseudogout with positive synovial fluid crystals for calcium pyrophosphate  -chronic medical conditions - Hx of colon cancer, osteoporosis, microscopic colitis, hyperlipidemia     Recommendations  1. Repeat CXR, consider repeating echocardiogram given ongoing and worsening shortness of breath.   2. I have been discussing goals of care daily with Tamar again. She seems to be failing medical therapy with worsening dyspnea, rising WBC and CRP, worrisome for valvular abscess or worsening CHF. She is not a surgical candidate. Antibiotics alone would not resolve this infection  2. Continue Cefazolin  3. At risk for additional complications of endocarditis , embolizatioin, CVA, cardiac arrythmias, CHF  4. Palliative care  following    Enedina Nathan MD    Interval History   Continues to feel poorly, shoulder pain is better, ongoing shortness of breath, pain all over and reports feeling poorly    Physical Exam   Temp: 98.5  F (36.9  C) Temp src: Oral BP: 127/60 Pulse: 102   Resp: 24 SpO2: 92 % O2 Device: Nasal cannula Oxygen Delivery: 2 LPM  Vitals:    05/03/22 0405 05/04/22 0618 05/05/22 0559   Weight: 62.2 kg (137 lb 1.6 oz) 64.9 kg (143 lb 1.6 oz) 65.8 kg (145 lb)     Vital Signs with Ranges  Temp:  [98  F (36.7  C)-98.5  F (36.9  C)] 98.5  F (36.9  C)  Pulse:  [] 102  Resp:  [16-24] 24  BP: ()/(44-70) 127/60  SpO2:  [92 %-99 %] 92 %    Constitutional: Awake, ill appearing, using accessory muscles to breathe  Lungs: diminished at bases  Cardiovascular: S1S2 loud systolic murmur  Abdomen: Normal bowel sounds, soft, non-distended, non-tender  Skin: No rashes, no cyanosis, no edema    Other:    Medications       acetaminophen  650 mg Oral 4x Daily     amiodarone  200 mg Oral BID     [START ON 5/16/2022] amiodarone  200 mg Oral Daily     aspirin  81 mg Oral BID     ceFAZolin  2 g Intravenous Q8H     [Held by provider] hydrOXYzine  25 mg Oral TID     pantoprazole (PROTONIX) IV  40 mg Intravenous Daily with breakfast     polyethylene glycol  17 g Oral Daily     rosuvastatin  20 mg Oral Daily     senna-docusate  1 tablet Oral BID    Or     senna-docusate  2 tablet Oral BID     sodium chloride (PF)  3 mL Intracatheter Q8H       Data   All microbiology laboratory data reviewed.  Recent Labs   Lab Test 05/04/22  0624 05/03/22  0606 05/01/22  1028   WBC 16.9* 16.0* 18.1*   HGB 8.9* 8.6* 9.1*   HCT 27.2* 26.2* 27.4*   MCV 94 93 92    340 290     Recent Labs   Lab Test 05/04/22  0624 05/03/22  0606 05/01/22  0418   CR 0.68 0.61 0.64     Microbiology  4/24 and 4/25 R shoulder   Shoulder, Right; Synovial fluid            0 Result Notes     Culture 4+ Staphylococcus aureus Abnormal               Resulting Agency: [facility]          Susceptibility                   Staphylococcus aureus       [place]IS       Clindamycin   Resistant 1       Erythromycin 4.0 ug/mL Intermediate       Gentamicin <=0.5 ug/mL Susceptible       Oxacillin 0.5 ug/mL Susceptible 2       Tetracycline <=1.0 ug/mL Susceptible       Trimethoprim/Sulfamethoxazole <=0.5/9.5 u... Susceptible       Vancomycin 1.0 ug/mL Susceptible                 4/29 Blood cx no growth so far, 4/28 negative cx pending, 4/27 Positive, 4/25 +, 4/24 +    Line, venous; Blood          0 Result Notes     Culture Positive on the 1st day of incubation Abnormal         Staphylococcus aureus Panic     2 of 2 bottles         Resulting Agency: [facility]         Susceptibility                   Staphylococcus aureus       [place]IS       Clindamycin   Resistant 1       Erythromycin 1.0 ug/mL Intermediate       Gentamicin <=0.5 ug/mL Susceptible       Oxacillin 0.5 ug/mL Susceptible 2       Tetracycline <=1.0 ug/mL Susceptible       Trimethoprim/Sulfamethoxazole <=0.5/9.5 u... Susceptible       Vancomycin 1.0 ug/mL Susceptible                 Imaging  4/29 LOKESH  Interpretation Summary     The left ventricle is normal in size.  The visual ejection fraction is 60-65%.  No regional wall motion abnormalities noted.  There is a large vegetation or mass on the aortic valve.  Mobile 1cm mass associated with the left coronary leaflet of the aortic valve  HIGHLY SUSPICIOUS for infective endocarditis with associated aortic  regurgitation.  There is moderately severe (3+) aortic regurgitation.  There is a small vegetation or mass on the mitral valve.  0.5 cm posterior leaflet homogenous density  There is mild to moderate (1-2+) mitral regurgitation.     Compared to the TTE from 4/26/2022 the aortic regurgitation is new and along  with the visible mass on the aortic and to a lesser extent the mitral valve is  HIGHLY SUGGESTIVE OF INFECTIVE ENDOCARDITIS with damage to the function of the  aortic valve.     4/24 CT abdomen  pelvis  EXAM: CT ABDOMEN PELVIS W CONTRAST  LOCATION: [facility]  DATE/TIME: 4/24/2022 2:14 PM     INDICATION: Abdominal pain, acute, nonlocalized  COMPARISON: None.  TECHNIQUE: CT scan of the abdomen and pelvis was performed following injection of IV contrast. Multiplanar reformats were obtained. Dose reduction techniques were used.  CONTRAST: 60mL Isovue 370     FINDINGS:   LOWER CHEST: Bibasilar primarily groundglass opacities, most pronounced in the right lower lobe. No pleural effusion. Small hiatal hernia.     HEPATOBILIARY: Cholelithiasis without evidence of acute cholecystitis. No biliary obstruction.     PANCREAS: Normal.     SPLEEN: Normal.     ADRENAL GLANDS: Normal.     KIDNEYS/BLADDER: Wedge-shaped area of hypoenhancement in the upper pole of the right kidney measuring up to 1.9 cm (coronal image 59). No hydronephrosis. Urinary bladder is distended but otherwise unremarkable.     BOWEL: Prior right hemicolectomy without evidence of bowel obstruction. There is mucosal hyperenhancement and submucosal edema involving the gastric antrum (series 4 image 55).     LYMPH NODES: Normal.     VASCULATURE: Ectasia of the infrarenal abdominal aorta measuring up to 2.7 cm with scattered calcified atherosclerosis.     PELVIC ORGANS: Prior hysterectomy.     MUSCULOSKELETAL: Prior vertebroplasty of L2, L3 and L5 with age-indeterminate compression deformity of L1. 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                                                                      IMPRESSION:   1.  Wedge-shaped area of hypoattenuation the upper pole of the right kidney. Given morphology, favor evolving infarction but consider correlation with urinalysis.  2.  Possible gastritis involving the distal stomach.  3.  Bibasilar groundglass pulmonary opacities, most pronounced in the right lower lobe, can be seen with aspiration and/or pneumonia.  4.  Multiple prior  vertebroplasties with additional age-indeterminate compression deformity of L1.

## 2022-05-06 NOTE — PLAN OF CARE
Goal Outcome Evaluation:           Tamar is transitioning to comfort care today. At the beginning of this shift, she was tachypneic, anxious, and struggling to breathe with Sa02 at 90% on 7L. She received IV Lasix x 1. With family present, she agreed to transition to hospice care. She received PO Dilaudid for right shoulder/generalized discomfort. Incontinent of diarrhea this morning. Tele: rapid afib with rates in 130s-140s after IV metoprolol. Declined repositioning. She transferred to station [id number] with all of her belongings.

## 2022-05-06 NOTE — PROGRESS NOTES
CLINICAL NUTRITION SERVICES - BRIEF NOTE    Chart reviewed as patient due for nutrition follow-up visit today. Noted that patient has elected to pursue comfort care measures. PO diet for comfort. RD to sign off at this time, no further nutrition interventions warranted. Please page with any needs.     Cici Rodríguez RD, LD  [facility] [address] Pager: [phone]

## 2022-05-06 NOTE — PROGRESS NOTES
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 Course  complicated by cardiac arrythmia with concern for valve abscess and now worsening dyspnea and CHF. She has worsening CRP, ongoing dyspnea and persistent leukocytosis suggestive of uncontrolled infection.      -Aortic valve endocarditis with 1cm mass on the left coronary leaflet with aortic regurgitation, and small vegetation on the mitral valve. Surgery is indicated but she is a poor surgical candidates   -Chest pain and atrial fibrillation with RVR related to complications of endocarditis . With persistent arrythmia concerned about valvular abscess  -High grade MSSA bacteremia  -Right shoulder septic arthritis with hx of steroid injection prior to onset of symptoms. S/p arthroscopic debridement with uncontrolled infection  -Ongoing leukocytosis and CRP elevation  -Right renal infarct  -Recent pneumonia treated with oral antibiotics. CT shows bilateral ground glass opacities. Resolving pneumonia vs staph pneumonia as source for sepsis  -Systolic murmur  -Pseudogout with positive synovial fluid crystals for calcium pyrophosphate  -chronic medical conditions - Hx of colon cancer, osteoporosis, microscopic colitis, hyperlipidemia     Recommendations  1. CXR/ Echo reviewed  2. Discussed findings at length with Tamar. She  severe valvular dysfunction related to infection which will not improve with antibiotics alone and has severe pulmonary HTN with CHF all contributing to shortness of breath. Advised palliative care. Tamar has been waiting for her son who will come on Monday but she expressed she cannot wait any longer. I called and spoke with her daughter Viktoriya and updated her , also advised family come up now and consider transition to Hospice  3.  Continue antibiotics until final goals of care have been determined by patient/family    Enedina Nathan MD    Interval History   Really struggling to breathe this morning, feels horrible    Physical Exam   Temp: 98.5  F (36.9  C) Temp src: Axillary BP: 109/52 Pulse: 80   Resp: 20 SpO2: 96 %   Oxygen Delivery: 4 LPM  Vitals:    05/04/22 0618 05/05/22 0559 05/06/22 0527   Weight: 64.9 kg (143 lb 1.6 oz) 65.8 kg (145 lb) 65 kg (143 lb 3.2 oz)     Vital Signs with Ranges  Temp:  [98.4  F (36.9  C)-98.5  F (36.9  C)] 98.5  F (36.9  C)  Pulse:  [] 80  Resp:  [16-28] 20  BP: (109-145)/(52-66) 109/52  SpO2:  [92 %-100 %] 96 %    Constitutional: Very uncomfortable, very dyspneic, using accessory muscles  Lungs: basal crackles  Cardiovascular: loud systolic murmur  Skin: No rashes, no cyanosis, no edema    Other:    Medications       acetaminophen  975 mg Oral TID     amiodarone  200 mg Oral BID     [START ON 5/16/2022] amiodarone  200 mg Oral Daily     aspirin  81 mg Oral BID     ceFAZolin  2 g Intravenous Q8H     [Held by provider] hydrOXYzine  25 mg Oral TID     pantoprazole (PROTONIX) IV  40 mg Intravenous Daily with breakfast     polyethylene glycol  17 g Oral Daily     rosuvastatin  20 mg Oral Daily     senna-docusate  1 tablet Oral BID    Or     senna-docusate  2 tablet Oral BID     sodium chloride (PF)  3 mL Intracatheter Q8H       Data   All microbiology laboratory data reviewed.  Recent Labs   Lab Test 05/04/22  0624 05/03/22  0606 05/01/22  1028   WBC 16.9* 16.0* 18.1*   HGB 8.9* 8.6* 9.1*   HCT 27.2* 26.2* 27.4*   MCV 94 93 92    340 290     Recent Labs   Lab Test 05/04/22  0624 05/03/22  0606 05/01/22  0418   CR 0.68 0.61 0.64     Microbiology  4/24 and 4/25 R shoulder   Shoulder, Right; Synovial fluid            0 Result Notes     Culture 4+ Staphylococcus aureus Abnormal               Resulting Agency: [facility]         Susceptibility                   Staphylococcus aureus       [place]       Clindamycin    Resistant 1       Erythromycin 4.0 ug/mL Intermediate       Gentamicin <=0.5 ug/mL Susceptible       Oxacillin 0.5 ug/mL Susceptible 2       Tetracycline <=1.0 ug/mL Susceptible       Trimethoprim/Sulfamethoxazole <=0.5/9.5 u... Susceptible       Vancomycin 1.0 ug/mL Susceptible                 4/29 Blood cx no growth so far, 4/28 negative cx pending, 4/27 Positive, 4/25 +, 4/24 +    Line, venous; Blood          0 Result Notes     Culture Positive on the 1st day of incubation Abnormal         Staphylococcus aureus Panic     2 of 2 bottles         Resulting Agency: [facility]         Susceptibility                   Staphylococcus aureus       [place]IS       Clindamycin   Resistant 1       Erythromycin 1.0 ug/mL Intermediate       Gentamicin <=0.5 ug/mL Susceptible       Oxacillin 0.5 ug/mL Susceptible 2       Tetracycline <=1.0 ug/mL Susceptible       Trimethoprim/Sulfamethoxazole <=0.5/9.5 u... Susceptible       Vancomycin 1.0 ug/mL Susceptible                 Imaging  5/5 Echocardiogram  Interpretation Summary     1. The left ventricle is normal in structure, function and size. The visual  ejection fraction is estimated at 60%.  2. The right ventricle is normal in structure, function and size.  3. Mitral valve has small mobile echodensity at the base of the posterior  leaflet, suspect mobile calcification.  4. There is moderate (2+) mitral regurgitation.  5. Severe (>55mmHg) pulmonary hypertension is present. The right ventricular  systolic pressure is approximated at 57mmHg plus the right atrial pressure.  6. Aortic valve has echodensity on the left cusp, seen on LOKESH from 4-29-22,  suspicious for vegetation.  7. There is moderately severe (3+) aortic regurgitation.  8. Moderate valvular aortic stenosis. Mean 22mmHg, Vmax 3.2m/s.     No changes from echo 4-30-22.

## 2022-05-06 NOTE — PROGRESS NOTES
"SPIRITUAL HEALTH SERVICES Progress Note  [facility]    REFERRAL SOURCE: consult for emotional support/palliative    Distress - Tamar shared that she wasn't doing well today and that \"this is it, [she's] reaching the end.\" She shared concern for her son and daughter as they process the news. She's lived with her son for the last ten years.    Coping - Tamar finds prayers meaningful, and we prayed together. She's looking forward to her son being able to visit this weekend and spoke to taking comfort in visits with her daughter, as well as knowing how many people are thinking and caring about her (as evidenced in the cards and flowers filling her room).    Meaning-Making - As Tamar considered her own illness in the context of world problems, she named making sense of it all as \"the beginning of the end.\" At the same time, she reflected on where she sees hope in the world (She named \"helpers\" and \"volunteers\") and her own hopes for heaven. She processed a dream she'd had years prior related to heaven.    Offered emotional and spiritual support through reflective conversation, prayer, and validation of Tamar's experience and wonderings around death and \"what comes next.\"    Plan - Will continue to follow. Spiritual Health remain available.    Rossy Florian  Associate   Phone: [phone]   "

## 2022-05-06 NOTE — PROGRESS NOTES
[facility]    Medicine Progress Note - Hospitalist Service    Date of Admission:  4/24/2022    Assessment & Plan   Tamar Rose is a 87 year old female admitted on 4/24/2022.  Past history of colon cancer, hyperlipidemia, osteoporosis, microscopic colitis, and iron deficiency anemia who presented to the ED with 3 days of nausea, diarrhea, and progressive right shoulder pain.  Arthrocentesis in ED concerning for septic joint.  ID consulted and started on antibiotic treatment.  Has had persistent bacteremia so far.  Status post I&D x2, last I&D on 4/30/2022.  Postop complication with new onset A. fib.  Cardiology consulted and started on amiodarone drip now transition to amiodarone p.o.  LOKESH has revealed aortic and mitral vegetations.  Cardiac surgery consulted and recommend valve replacement, however there is concern this is high risk due to patient's frail status.  Family meeting held 5/1/2022, and at this time they are still undecided about surgery but leaning more towards not getting it.  We will consult palliative care to assist with goals of care.        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 fib RVR secondary to above, see below.  Pulmonary vascular congestion, hypoxia secondary to above  Per family, underwent steroid injection to shoulder 4/19/22 with subsequent increasing edema and pain refractory to PTA oxycodone.  * admission leukocytosis of 22 with lactate of 2.4;   * arthrocentesis 4/24 with turbid fluid with Staph aureus on culture; also noted calcium pyrophosphate crystals  * admission blood cultures positive for MSSA  * persistent bacteremia; repeat cultures daily  * TTE withouth vegetations  * Repeat I&D 4/30/22  * LOKESH with aortic and mitral vegetations seen  * CT surgery consult, Care conference had on 05/01to discuss urgent valve  replacement; it appears patient and family remain undecided due to patient's frail status but are leaning more towards not getting it.  * ID following, recommending continue cefazolin.  -ongoing pain.  Has as needed p.o. oxycodone, IV hydromorphone, will add scheduled acetaminophen in attempts to decrease use of narcotics, add adjuvant hydroxyzine scheduled trial, and as pain increased with movement trial as needed cyclobenzaprine.  Pain controlled with current regimen.  No excess sedation.  -Poor surgical candidacy for CorThor intervention, discussed with Dr. Crocker, Cardiology given severe MR/AI likely continued A. fib RVR difficult to control with 2' rate related ischemia/chest pain.  For now continue amiodarone, use of diltiazem drip or metoprolol limited by hypotension.  Discussed with Palliative Care saw patient with family to discuss goals of care 5/3/2022 given poor prognosis per assessment of cardiothoracic surgery, Cardiology.  See Palliative care note, for now daughter wishes to continue current regimen.  Follow-up call to Palliative Care  5/4/2022 reiterating Cardiology opinion of poor prognosis given significant thrombus load on aortic and mitral valve, nonsurgical candidate.  Discussed with Dr. Crocker Cardiology who reiterated poor prognosis and stated she discussed with Palliative care; no Pall Care Note 5/4/22  -5/5/22 Pall Care discussion with Patient and [place] pending; it appears that patient is ambivalent regarding goals of care, she is concerned regarding son.  Daughter Viktoriya appears to be major decision maker and upon last Pall Care visit wanted continued restorative care.  Although patient is leaning towards care directed at comfort as she is aware that her condition/septic endocarditis is nonsurgical she remains ambivalent and wants to wait upon discussion with daughter Cara later today re: goals of care.  -5/6/2022.  This morning patient with progressive respiratory distress with hypoxia,  suspect pulmonary vascular congestion, she did not improve and was more comfortable after IV furosemide however remains requiring increased supplemental O2.  Discussed with Patient and Daughter Viktoriya today with Shyann from Pall Care with progressive decline in patient with nonsurgerical endocarditis with significant AI/MR 2' a fib RVR recurrent and now with increased hypoxia 2' pulmonary vascular congestion that intervention for these conditions would not be restorative or reverse her progressive decline and underlying condition.  Discussed progression of underlying septic arthritis with increasing CRP. With knowledge of patient's wishes regarding the quality of her life it was decided by Patient and Daughter Cara to transition patient to Comfort Care. Both Patient and Daughter Viktoriya are aware that consistent with Comfort Care all restorative medications, diagnostic testing and vital signs will be discontinued and cares focused on patient's Comfort.   Pall care to enter comfort care medications.  Viktoriya states she has informed Brother of Patient's condition since admission and Son elects to visit Patient tomorrow; Patient and Daughter elect to proceed with comfort care today.     Possible pneumonia    Treated for CAP with 7-day course of Ceftin starting 4/1/22   Patient transitioned to Comfort Care 5/6/2022.  All restorative treatments, further diagnostic testing, vital signs are discontinued consistent with cares directed at Comfort. see #1.    Atrial fibrillation RVR   Patient transitioned to Comfort Care 5/6/2022.  All restorative treatments, further diagnostic testing, vital signs are discontinued consistent with cares directed at Comfort. see #1.     Acute blood loss anemia   Patient transitioned to Comfort Care 5/6/2022.  All restorative treatments, further diagnostic testing, vital signs are discontinued consistent with cares directed at Comfort. see #1. Patient transitioned to      Urinary retention  Coley to  remain in place for Patient comfort     Right upper pole kidney hypoattenuation-concern for infarction   Patient transitioned to Comfort Care 5/6/2022.  All restorative treatments, further diagnostic testing, vital signs are discontinued consistent with cares directed at Comfort. see #1.     Nausea, vomiting, and diarrhea  Anti-emetics available; no noted diarrhea     Hyponatremia, resolved  Admission Na 132, suspect hypovolemic.  Normalized with IVF.    Patient transitioned to Comfort Care 5/6/2022.  All restorative treatments, further diagnostic testing, vital signs are discontinued consistent with cares directed at Comfort. see #1.     Chronic conditions  Osteoporosis  FRANCESCO  Hyperlipidemia: continue PTA statin  History of colon cancer s/p right hemicolectomy 8/2017; in remission       Diet: Regular Diet Adult  Room Service  Snacks/Supplements Adult: Ensure Enlive; Between Meals    DVT Prophylaxis: Pneumatic Compression Devices  Coley Catheter: PRESENT, indication: Retention  Central Lines: None  Cardiac Monitoring: None  Code Status: No CPR- Do NOT Intubate      Disposition Plan   Expected Discharge: TBD  Patient transitioned to Comfort Care 5/6/2022.  Pending status within the next 24 hours consider need to consult  regarding hospice.    Denis Myers MD  Hospitalist Service  [facility]  Securely message with the Vocera Web Console (learn more here)  Text page via [employer] Paging/Directory     Total unit/floor time 45 minutes:  time consisted of the following, examination of patient, review of records including labs, imaging results, medications, interdisciplinary notes and completing documentation; > 50% Counseling/discussion with Patient and Daughter Viktoriya regarding current condition; prognosis and transition to Comfort Care, see above.  Discussion with Pall Care, NP.  ________________________________________________________________    Interval History   On morning encounter  patient increasingly tachypneic, hypoxic with increased supplemental O2 requirements, given after IV furosemide.  See discussion above with Patient, Daughter, Palliative Care.    Data reviewed today: I reviewed all medications, new labs and imaging results over the last 24 hours.    Physical Exam   Vital Signs: Temp: 98.5  F (36.9  C) Temp src: Axillary BP: (!) 165/86 Pulse: (!) 182   Resp: 20 SpO2: 95 %   Oxygen Delivery: 7 LPM  Weight: 143 lbs 3.2 oz  General Appearance: Mild distress due to tachypnea, hypoxia.  Appears tired.  Calm.  Respiratory: Tachypnea, mild air hunger.  Supplemental O2.  Cardiovascular: Irregular, irregular.  GI: Soft, nontender.  No rebound, guarding or peritoneal signs.  Musculoskeletal, right shoulder dressed.  ROM not tested  Neuro.  Gross motor tested, nonfocal, sensory intact  Psych oriented, affect tired, calm     Data   Recent Labs   Lab 05/06/22  0659 05/04/22  0624 05/03/22  0606 05/01/22  0836 05/01/22  0418   WBC 18.3* 16.9* 16.0*   < >  --    HGB 8.9* 8.9* 8.6*   < >  --    MCV 97 94 93   < >  --     359 340   < >  --     138 138  --  140   POTASSIUM 4.1 3.5 3.6  --  4.1   CHLORIDE 105 108 109  --  111*   CO2 29 28 25  --  21   BUN 11 12 14  --  11   CR 0.69 0.68 0.61  --  0.64   ANIONGAP 4 2* 4  --  8   MYRIAM 8.1* 8.0* 8.0*  --  6.8*   GLC 94 112* 119*   < > 98   ALBUMIN  --   --   --   --  1.4*   PROTTOTAL  --   --   --   --  4.5*   BILITOTAL  --   --   --   --  0.2   ALKPHOS  --   --   --   --  125   ALT  --   --   --   --  30   AST  --   --   --   --  39    < > = values in this interval not displayed.

## 2022-05-06 NOTE — PROGRESS NOTES
[facility]  Palliative Care Daily Progress Note       Recommendations & Counseling     -Comfort care initiated today after discussion with Tamar, Dr Myers, daughter Viktoriya and palliative care APRN.  -If Tamar remains stable on comfort care, outpatient hospice may be a appropriate consideration.     Pain of shoulders, chest.   -Start scheduled hydromorphone 1 mg every 6 hours for baseline pain control.   -Hydromorphone 1 through 2 mg orally every 2 hours as needed for moderate to severe pain.  -Continue hydromorphone 0.2 through 0.4 mg IV every 2 hours as needed for severe pain if unable to tolerate oral hydromorphone.  -Continue Flexeril as needed for muscle spasms/discomfort.    Shortness of breath related to heart failure.  -Recommend scheduled hydromorphone 1 mg every 6 hours for baseline pain control.   -Hydromorphone 1 through 2 mg orally every 2 hours as needed.  -Continue hydromorphone 0.2 through 0.4 mg IV every 2 hours as needed if unable to tolerate oral hydromorphone.     Anxiety related to serious illness.  -Recommend lorazepam 1 mg as needed for anxiety    High risk for delirium related to medications, severe illness, infection, hospital.  -Maintain day/night routines and orientation.  -Optimize hydration/nutrition, and sleep.  -Utilize sensory aids to maintain orientation such as eye glasses, hearing aids or pocket talkers.  -Calm environment, quiet/reassuring voices, orienting cues, minimized noise/night disruptions, when possible.   -prevent/treat pain.    Other comfort meds:   Robinul for secretions if they become problematic    Case was reviewed with Dr. Myers, and patient's RN    Shyann Pennington, [facility]  Contact information available via [facility] Paging/Directory        Thank you for the opportunity to participate in the care of this patient and family. Our team: will continue to follow.     During regular M-F work hours (9350-4316) -- if you  are not sure who specifically to contact -- please contact us in [facility] Smart Web.     After regular work hours and on weekends/holidays, you can call our answering service at [phone].     Attestation:  Total time on the floor involved in the patient's care: 35 minutes  Total time spent in counseling/care coordination, symptom assessment, goals of care,: >50%    Time in addition to above E/M time with patient contact was 30 minutes in room with patient and relatives from 9 AM through 9:30 AM discussing current condition, goals of care, comfort focused care, hospice services.    Total time 65 minutes   Assessments          Tamar Rose is a 87 year old female with PMH significant for osteoporosis, arthritis, colon cancer, microscopic colitis, iron deficiency anemia.  She presented to ED on 4/24/2022 for severe shoulder pain status post cortisone injection, ultimately leading to nausea, and diarrhea.  She underwent 2 I&D's of her shoulder and found to have MSSA with bacteremia.  She developed postop complications with new onset A. fib.  LOKESH discovered aortic and mitral valve vegetations.  Cardiac surgery evaluated and recommended valve replacement however due to patient's poor functional status and age she would be at high risk for poor outcomes and quality of life post surgery.  Patient and family have decided to forego the surgery at this time.     Today, the patient was seen for:  Goals of care, pain, symptom management, CODE STATUS discussion.    Prognosis, Goals, or Advance Care Planning was addressed today with: Yes.  Mood, coping, and/or meaning in the context of serious illness were addressed today: Yes.  Summary/Comments: Tamar had a good conversation with the palliative  which she enjoyed very much. Enjoys this support.            Interval History:     Chart review/discussion with unit or clinical team members:   Good patient case with patient's RN and Dr. Myers.    Per patient or  family/caregivers today:  Dr. Myers was discussing Tamar's current medical condition and no good treatment options for the infection of her heart.  The decision was made to initiate comfort care to avoid exacerbation of symptoms from additional IV fluids or other medications and focus on comfort. Tamar had an episode of dyspnea earlier this morning which was very frightening for her-she needed Lasix to diurese fluids from her body and this helped her breathing.  Comfort care she will likely be more comfortable and free from distressing symptoms.  Tamar and daughter Viktoriya are in agreement with this plan.  Reviewed some of the medications we would discontinue and focus on those that will relieve symptoms of pain, shortness of breath.     Key Palliative Symptoms:  # Pain severity the last 12 hours: moderate  # Dyspnea severity the last 12 hours: none  # Nausea severity the last 12 hours: none  # Anxiety severity the last 12 hours: low    Patient is on opioids: assessed and bowels ok/no needed changes to plan of care today.           Review of Systems:     Besides above, an additional  system ROS was reviewed and is unremarkable          Medications:     I have reviewed this patient's medication profile and medications during this hospitalization.    Noted meds:      acetaminophen  975 mg Oral TID     amiodarone  200 mg Oral BID     [START ON 5/16/2022] amiodarone  200 mg Oral Daily     [Held by provider] aspirin  81 mg Oral BID     ceFAZolin  2 g Intravenous Q8H     [Held by provider] hydrOXYzine  25 mg Oral TID     pantoprazole (PROTONIX) IV  40 mg Intravenous Daily with breakfast     polyethylene glycol  17 g Oral Daily     rosuvastatin  20 mg Oral Daily     senna-docusate  1 tablet Oral BID    Or     senna-docusate  2 tablet Oral BID     sodium chloride (PF)  3 mL Intracatheter Q8H     acetaminophen **OR** acetaminophen, albuterol, sore throat lozenge, bisacodyl, calcium carbonate, cyclobenzaprine, HYDROmorphone,  HYDROmorphone, lidocaine 4%, lidocaine (buffered or not buffered), magnesium hydroxide, melatonin, metoprolol, naloxone **OR** naloxone **OR** naloxone **OR** naloxone, nitroGLYcerin, ondansetron **OR** ondansetron, polyethylene glycol, prochlorperazine **OR** prochlorperazine, sodium chloride (PF)               Physical Exam:   Temp: 98.5  F (36.9  C) Temp src: Axillary BP: (!) 165/86 Pulse: (!) 182   Resp: 20 SpO2: 95 %   Oxygen Delivery: 7 LPM  GENERAL:  Alert, fatigued, no distress, weak.  HEAD: Normocephalic atraumatic  SCLERA: Anicteric  EXTREMITIES: Warm; no edema; bilateral LE bruising.  ABDOMEN:  Soft, nontender  RESPIRATORY: Breathing relaxed and non labored.  NEUROLOGIC: Alert, attentive discussion.  PSYCH: Calm, cooperative.           Data Reviewed:     Recent imaging reviewed, my comments on pertinents:   Results for orders placed or performed during the hospital encounter of 04/24/22   CT Abdomen Pelvis w Contrast    Impression    IMPRESSION:   1.  Wedge-shaped area of hypoattenuation the upper pole of the right kidney. Given morphology, favor evolving infarction but consider correlation with urinalysis.  2.  Possible gastritis involving the distal stomach.  3.  Bibasilar groundglass pulmonary opacities, most pronounced in the right lower lobe, can be seen with aspiration and/or pneumonia.  4.  Multiple prior vertebroplasties with additional age-indeterminate compression deformity of L1.   US Renal Complete    Impression    IMPRESSION:  1.  Normal kidney ultrasound. Hypodense lesion upper pole right kidney seen on CT not appreciated sonographically.   XR Chest Port 1 View    Impression    IMPRESSION: Moderate infiltrates within the right lower lung most consistent with pneumonia. Additional mild infiltrates in the left lung base may be related. Normal heart size and pulmonary vascularity.   XR Chest Port 1 View    Impression    IMPRESSION: Previously noted bilateral lower lung infiltrates have  improved. There may be a small amount of pleural fluid bilaterally. Heart size is stable. Pulmonary vascularity is within normal limits.   CT Chest Pulmonary Embolism w Contrast    Impression    IMPRESSION:  1.  No evidence for pulmonary embolism.  2.  Smooth interlobular septal thickening in both lungs likely represents pulmonary edema.  3.  Small to moderate bilateral pleural effusions, with mild associated atelectasis at both lung bases.  4.  Mild patchy groundglass opacity at the right lung apex may also be related to edema, although a superimposed infectious process cannot be excluded.   Echocardiogram Complete   Result Value Ref Range    LVEF  65-70%    Echocardiogram LOKESH   Result Value Ref Range    LVEF  60-65%    Echocardiogram Limited   Result Value Ref Range    LVEF  >70%      Lab Results   Component Value Date    WBC 18.3 (H) 05/06/2022    WBC 16.9 (H) 05/04/2022    WBC 16.0 (H) 05/03/2022    HGB 8.9 (L) 05/06/2022    HGB 8.9 (L) 05/04/2022    HGB 8.6 (L) 05/03/2022    HCT 28.2 (L) 05/06/2022    HCT 27.2 (L) 05/04/2022    HCT 26.2 (L) 05/03/2022     05/06/2022     05/04/2022     05/03/2022     05/06/2022     05/04/2022     05/03/2022    POTASSIUM 4.1 05/06/2022    POTASSIUM 3.5 05/04/2022    POTASSIUM 3.6 05/03/2022    CHLORIDE 105 05/06/2022    CHLORIDE 108 05/04/2022    CHLORIDE 109 05/03/2022    CO2 29 05/06/2022    CO2 28 05/04/2022    CO2 25 05/03/2022    BUN 11 05/06/2022    BUN 12 05/04/2022    BUN 14 05/03/2022    CR 0.69 05/06/2022    CR 0.68 05/04/2022    CR 0.61 05/03/2022    GLC 94 05/06/2022     (H) 05/04/2022     (H) 05/03/2022    NTBNPI 11,044 (H) 05/03/2022    AST 39 05/01/2022    AST 37 04/25/2022    AST 26 04/24/2022    ALT 30 05/01/2022    ALT 38 04/25/2022    ALT 33 04/24/2022    ALKPHOS 125 05/01/2022    ALKPHOS 88 04/25/2022    ALKPHOS 67 04/24/2022    BILITOTAL 0.2 05/01/2022    BILITOTAL 0.6 04/25/2022    BILITOTAL 0.5 04/24/2022

## 2022-05-06 NOTE — PLAN OF CARE
Cognition/Mentation: A/O x4    VS: Stable, 4L NC    Cardiac: SR    Respiratory: LS diminished, BOWENS    GI/: carpenter for retention    Activity: in bed overnight    Pain: significant pain with movement but patient says she's comfortable at rest; PRN Dilaudid given    Drains/Tubes: PIV SL    Skin: dressing to right shoulder CDI, scattered bruising    Disposition: pending

## 2022-05-06 NOTE — PROVIDER NOTIFICATION
Paged regarding Code status.     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 fib RVR. She has been deemed not to be a surgical candidate. Palliative care has been following.     The patient and her daughter have decided to switch the patient's code status to DNR/DNI.   They have not yet decided on comfort cares, but are leaning toward that decision likely in the coming days.     Code status changed to DNR/DNI    Anne Marie Whiting

## 2022-05-06 NOTE — PLAN OF CARE
Arrived to unit at 1600. Comfort care. AOx4. 4L NC, removes frequently. C/o pain 7/10, scheduled tylenol and dilaudid given. Generalized edema +1/2. Regular diet, good appetite. Coley for retention. Up A2, lift. R/L PIV SL. Repo q2 offered, pt refused. Family at bedside.

## 2022-05-07 NOTE — PLAN OF CARE
19:00-07:30  Comfort cares. A/Ox4. 4L NC. C/o R shoulder pain, tylenol and oral dilaudid given. Ativan given x1 for anxiety w/ relief.  PIV's removed due to pain while flushing. Tolerating regular diet. Ax2, refusing repos at times. Coley in place for retention. Discharge plan pending.

## 2022-05-07 NOTE — PROGRESS NOTES
[facility]    Medicine Progress Note - Hospitalist Service    Date of Admission:  4/24/2022    Assessment & Plan   Tamar Rose is a 87 year old female admitted on 4/24/2022.  Past history of colon cancer, hyperlipidemia, osteoporosis, microscopic colitis, and iron deficiency anemia who presented to the ED with 3 days of nausea, diarrhea, and progressive right shoulder pain.  Arthrocentesis in ED concerning for septic joint.  ID consulted and started on antibiotic treatment.  Has had persistent bacteremia so far.  Status post I&D x2, last I&D on 4/30/2022.  Postop complication with new onset A. fib.  Cardiology consulted and started on amiodarone drip now transition to amiodarone p.o.  LOKESH has revealed aortic and mitral vegetations.  Cardiac surgery consulted and recommend valve replacement, however there is concern this is high risk due to patient's frail status.  Family meeting held 5/1/2022, and at this time they are still undecided about surgery but leaning more towards not getting it.  We will consult palliative care to assist with goals of care.        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 fib RVR secondary to above, see below.  Pulmonary vascular congestion, hypoxia secondary to above  Per family, underwent steroid injection to shoulder 4/19/22 with subsequent increasing edema and pain refractory to PTA oxycodone.  * admission leukocytosis of 22 with lactate of 2.4;   * arthrocentesis 4/24 with turbid fluid with Staph aureus on culture; also noted calcium pyrophosphate crystals  * admission blood cultures positive for MSSA  * persistent bacteremia; repeat cultures daily  * TTE withouth vegetations  * Repeat I&D 4/30/22  * LOKESH with aortic and mitral vegetations seen  * CT surgery consult, Care conference had on 05/01to discuss urgent valve  replacement; high risk surgery   ID following,continue cefazolin.  -interim Notes see prior Progress Notes  -5/6/2022. Patient with progressive respiratory distress with hypoxia, suspect pulmonary vascular congestion, she did not improve and was more comfortable after IV furosemide however remains requiring increased supplemental O2.  Discussed with Patient and Daughter Viktoriya today with Shyann from Pall Care with progressive decline in patient with nonsurgerical endocarditis with significant AI/MR 2' a fib RVR recurrent and now with increased hypoxia 2' pulmonary vascular congestion that intervention for these conditions would not be restorative or reverse her progressive decline and underlying condition.  Discussed progression of underlying septic arthritis with increasing CRP. With knowledge of patient's wishes regarding the quality of her life it was decided by Patient and Daughter Cara to transition patient to Comfort Care. Both Patient and Daughter Viktoriya are aware that consistent with Comfort Care all restorative medications, diagnostic testing and vital signs will be discontinued and cares focused on patient's Comfort.   Pall care to enter comfort care medications.  Viktoriya states she has informed Brother of Patient's condition since admission and Son elects to visit Patient Saturday. Patient and Daughter elect to proceed with comfort care today.   -5/7/2022 increased work of breathing, complaints of chest pain.  Patient on comfort care, see above.  Likely related to increased pulmonary vascular congestion, known AI/MR secondary to septic emboli on valves; nonsurgical;, A. fib RVR.  Comfort measures, added high concentration MS sublingual, atropine drops for congestion, changed O2 per facemask for patient comfort.   With these measures patient more comfortable.  Daughter Emy at bedside.  Son in transit from [facility].    Possible pneumonia    Treated for CAP with 7-day course of Ceftin starting 4/1/22   Patient  transitioned to Comfort Care 5/6/2022.  All restorative treatments, further diagnostic testing, vital signs are discontinued consistent with cares directed at Comfort. see #1.    Atrial fibrillation RVR   Patient transitioned to Comfort Care 5/6/2022.  All restorative treatments, further diagnostic testing, vital signs are discontinued consistent with cares directed at Comfort. see #1.     Acute blood loss anemia   Patient transitioned to Comfort Care 5/6/2022.  All restorative treatments, further diagnostic testing, vital signs are discontinued consistent with cares directed at Comfort. see #1. Patient transitioned to      Urinary retention  Coley to remain in place for Patient comfort     Right upper pole kidney hypoattenuation-concern for infarction   Patient transitioned to Comfort Care 5/6/2022.  All restorative treatments, further diagnostic testing, vital signs are discontinued consistent with cares directed at Comfort. see #1.     Nausea, vomiting, and diarrhea  Anti-emetics available; no noted diarrhea     Hyponatremia, resolved  Admission Na 132, suspect hypovolemic.  Normalized with IVF.    Patient transitioned to Comfort Care 5/6/2022.  All restorative treatments, further diagnostic testing, vital signs are discontinued consistent with cares directed at Comfort. see #1.     Chronic conditions  Osteoporosis  FRANCESCO  Hyperlipidemia: continue PTA statin  History of colon cancer s/p right hemicolectomy 8/2017; in remission       Diet: Regular Diet Adult    DVT Prophylaxis: Pneumatic Compression Devices  Coley Catheter: PRESENT, indication: Retention  Central Lines: None  Cardiac Monitoring: None  Code Status: No CPR- Do NOT Intubate      Disposition Plan   Expected Discharge: TBD  Patient transitioned to Comfort Care 5/6/2022.  Pending status within over the 24 hours likely eminent passing; Daughter present; Son in transit    Denis Myers MD  Hospitalist Service  [facility]  Hospital  Securely message with the Vocera Web Console (learn more here)  Text page via [facility] Paging/Directory     Total unit/floor time 35 minutes:  time consisted of the following, examination of patient, review of records including reviewed medications, interdisciplinary notes and completing documentation; > 50% Counseling/discussion with Patient's Daughter  regarding current condition and comfort care measures  Coordination of Care time with Nursing re: increased comfort care medications; care plan    ________________________________________________________________    Interval History   On morning encounter increased work of breathing, tachypneic.  Complains of anterior chest pain.  See above, increase comfort measures MS sublingual, atropine for secretions, changed to face max.  Daughter, Viktoriya at bedside, son in transit.      Data reviewed today: I reviewed all medications over the last 24 hours.    Physical Exam   Vital Signs: Temp: 98.5  F (36.9  C) Temp src: Axillary BP: 119/70 Pulse: 100   Resp: 20   O2 Device: Oxymask Oxygen Delivery: 4 LPM  Weight: 143 lbs 3.2 oz  General Appearance: Mild distress due to tachypnea, appears tired, complains general pain.  Respiratory: Tachypnea, air hunger.  Supplemental O2 per NC changed to facemask  Cardiovascular: Irregular, irregular.  GI: Soft, nontender.

## 2022-05-07 NOTE — PLAN OF CARE
Comfort care. AOx4. 4L oxymask, removes frequently. C/o pain 7/10, scheduled tylenol and dilaudid given with no success. PRN morphine, ativan, haldol and atropine. Generalized edema +1/2. Extremities are miguel/bruised. Coccyx mepilex CDI. Regular diet. Coley for retention. Up A2, lift. Repo q2 offered, pt refused. Family at bedside.

## 2022-05-08 NOTE — DISCHARGE SUMMARY
[facility]    Death Summary  Hospitalist    Date of Admission:  2022  Date of Discharge: Discharge today due to death.  Discharging Provider: Denis Myers MD  Date of Service (when I saw the patient): 22    Please see progress note and LILLIAN death pronouncement from earlier today.    Briefly, patient with known MSSA septic right shoulder arthritis with bacteremia, secondary aortic and mitral valve endocarditis with AI/MR, secondary to A. fib RVR, secondary pulmonary vascular congestion with hypoxia.  Patient's septic atrial valve and mitral valve thrombus nonsurgical.  On 2022 following discussion with patient and daughter patient was transitioned to comfort care.  Patient passed peacefully today 2022, daughter and granddaughter had visited patient earlier today.  On death, daughter was notified and came to hospital with .  Informed of patient's death and circumstance.  Patient  peacefully.  Please see LILLIAN note regarding disposition of body, to hospital [facility] [place] [place], family wishes cremation Society.    MD Velia  Internal Medicine  [facility]

## 2022-05-08 NOTE — PROGRESS NOTES
SPIRITUAL HEALTH SERVICES Progress Note  [facility] 89    REFERRAL SOURCE: family request for prayer at time of death    Offered emotional/spiritual and grief support to Tamar's daughter, Viktoriya, and Viktoriya's . Prayer was provided and words of comfort shared.    PLAN: Spiritual Health remains available.    Rossy Florian  Associate   Phone: [phone]

## 2022-05-08 NOTE — DEATH PRONOUNCEMENT
NP DEATH PRONOUNCEMENT    Called to pronounce Tamar Rose dead.    Physical Exam: Spontaneous respirations absent, Breath sounds absent, Carotid pulse absent and Heart sounds absent    Patient was pronounced dead at 2:50 PM, May 8, 2022.    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 (examples: HIV, chicken pox, TB, Ebola, CJD) :  NO    Multi-drug resistant organism present? (example: MRSA): NO    Please consider an autopsy if any of the following exist:  NO Unexpected or unexplained death during or following any dental, medical, or surgical diagnostic treatment procedures.   NO Death of mother at or up to seven days after delivery.     NO All  and pediatric deaths.     NO Death where the cause is sufficiently obscure to delay completion of the death certificate.   NO Deaths in which autopsy would confirm a suspected illness/condition that would affect surviving family members or recipients of transplanted organs.     The following deaths must be reported to the 's Office:  NO A death that may be due entirely or in part to any factors other than natural disease (recent surgery, recent trauma, suspected abuse/neglect).   NO A death that may be an accident, suicide, or homicide.     NO Any sudden, unexpected death in which there is no prior history of significant heart disease or any other condition associated with sudden death.   NO A death under suspicious, unusual, or unexpected circumstances.    NO Any death which is apparently due to natural causes but in which the  does not have a  personal physician familiar with the patient s medical history, social, or environmental situation or the circumstances of the terminal event.   NO Any death apparently due to Sudden Infant Death Syndrome.     NO Deaths that occur during, in association with, or as consequences of a diagnostic, therapeutic, or anesthetic procedure.   NO Any death in which a fracture of a major bone has occurred within the past (6) six months.   NO A death of persons note seen by their physician within 120 days of demise.     NO Any death in which the  was an inmate of a public institution or was in the custody of Law Enforcement personnel.   NO  All unexpected deaths of children   NO Solid organ donors   NO Unidentified bodies   YES Deaths of persons whose bodies are to be cremated or otherwise disposed of so that the bodies will later be unavailable for examination;   NO Deaths unattended by a physician outside of a licensed healthcare facility or licensed residential hospice program   NO Deaths occurring within 24 hours of arrival to a health care facility if death is unexpected.    NO Deaths associated with the decedent s employment.   NO Deaths attributed to acts of terrorism.   NO Any death in which there is uncertainty as to whether it is a medical examiner s care should be discussed with the medical investigator.        Body disposition: Autopsy was discussed with family member:  Daughter Viktoriya in person.  Permission for autopsy was declined.    Usman Solis NP on 2022 at 3:49 PM  House and Hospitalist [facility]

## 2022-05-08 NOTE — PROGRESS NOTES
"SPIRITUAL HEALTH SERVICES Progress Note  [facility] 89    REFERRAL SOURCE: family request for  support    On this visit, Tamar's daughter and granddaughter were present at bedside. They reflected on their experience of vigiling, naming appreciation for the opportunity to \"say goodbyes\" and be present with Tamar. They also wondered about the dying process. Together, we explored their questions and what they're observing in Tamar.    I offered emotional support in reflective conversation and words of comfort, affirmed the care they're showing Tamar in these moments, and provided prayer at their request.    PLAN: Spiritual Health remains available.    Rossy Florian  Associate   Phone: [phone]   "

## 2022-05-08 NOTE — PLAN OF CARE
Pt  at 1450. MD notified. Family contacted.  came to bedside for prayer. Belongings sent with family. Pt sent to [facility].

## 2022-05-08 NOTE — PROGRESS NOTES
[facility]    Medicine Progress Note - Hospitalist Service    Date of Admission:  4/24/2022    Assessment & Plan   Tamar Rose is a 87 year old female admitted on 4/24/2022.  Past history of colon cancer, hyperlipidemia, osteoporosis, microscopic colitis, and iron deficiency anemia who presented to the ED with 3 days of nausea, diarrhea, and progressive right shoulder pain.  Arthrocentesis in ED concerning for septic joint.       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 fib RVR secondary to above, see below.  Pulmonary vascular congestion, hypoxia secondary to above  Per family, underwent steroid injection to shoulder 4/19/22 with subsequent increasing edema and pain refractory to PTA oxycodone.  * admission leukocytosis of 22 with lactate of 2.4;   * arthrocentesis 4/24 with turbid fluid with Staph aureus on culture; also noted calcium pyrophosphate crystals  * admission blood cultures positive for MSSA  * persistent bacteremia; repeat cultures daily  * TTE withouth vegetations  * Repeat I&D 4/30/22  * LOKESH with aortic and mitral vegetations seen  * CT surgery consult, Care conference had on 05/01to discuss urgent valve replacement; high risk surgery   ID following,continue cefazolin.  -interim Notes see prior Progress Notes  -5/6/2022. Patient with progressive respiratory distress with hypoxia, suspect pulmonary vascular congestion, increasing supplemental O2.  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 Patient and Daughter Cara to  wished transition patient to Comfort Care. Both Patient and Daughter Viktoriya are aware that consistent with Comfort Care all restorative medications, diagnostic testing and vital signs will be discontinued and cares focused on patient's Comfort.   Pall care to enter comfort care medications.  Viktoriya states she has informed Brother of Patient's condition  -Comfort care measures.  Patient appears comfortable, sedated for as needed PRN morphine, Ativan for patient's comfort.  Patient increasingly appears tired.  Family members have been present.    Possible pneumonia    Treated for CAP with 7-day course of Ceftin starting 4/1/22   Patient transitioned to Comfort Care 5/6/2022.  All restorative treatments, further diagnostic testing, vital signs are discontinued consistent with cares directed at Comfort. see #1.    Atrial fibrillation RVR   Patient transitioned to Comfort Care 5/6/2022.  All restorative treatments, further diagnostic testing, vital signs are discontinued consistent with cares directed at Comfort. see #1.     Acute blood loss anemia   Patient transitioned to Comfort Care 5/6/2022.  All restorative treatments, further diagnostic testing, vital signs are discontinued consistent with cares directed at Comfort. see #1. Patient transitioned to      Urinary retention  Coley to remain in place for Patient comfort     Right upper pole kidney hypoattenuation-concern for infarction   Patient transitioned to Comfort Care 5/6/2022.  All restorative treatments, further diagnostic testing, vital signs are discontinued consistent with cares directed at Comfort. see #1.     Nausea, vomiting, and diarrhea  Anti-emetics available; no noted diarrhea     Hyponatremia, resolved  Admission Na 132, suspect hypovolemic.  Normalized with IVF.    Patient transitioned to Comfort Care 5/6/2022.  All restorative treatments, further diagnostic testing, vital signs are discontinued consistent with cares directed at Comfort. see  #1.     Chronic conditions  Osteoporosis  FRANCESCO  Hyperlipidemia: continue PTA statin  History of colon cancer s/p right hemicolectomy 8/2017; in remission       Diet: Regular Diet Adult    DVT Prophylaxis: Pneumatic Compression Devices  Coley Catheter: PRESENT, indication: Retention;End of Life  Central Lines: None  Cardiac Monitoring: None  Code Status: No CPR- Do NOT Intubate      Disposition Plan   Expected Discharge: TBD  Patient transitioned to Comfort Care,  Pending status over the next 24 hours likely eminent passing; however after 24 hours may need SW consult for Hospice.     Denis yMers MD  Hospitalist Service  [facility]  Securely message with the Vocera Web Console (learn more here)  Text page via [employer] Paging/Directory     I spent 25 minutes on the unit/floor in management of care today reviewing labs, medications, interdisciplinary notes; and completing documentation of encounter and orders with over 50% of my time was spent  Coordinating Care and plan with Nursing regarding comfort care measures and medications.   ________________________________________________________________    Interval History   No history from patient, sedated.  Appears comfortable.  Respirations nonlabored, no increased secretions.  Nursing reports daughter and granddaughter present at bedside.       Data reviewed today: I reviewed all medications over the last 24 hours.    Physical Exam   Vital Signs:           Resp: 24        Weight: 143 lbs 3.2 oz  General Appearance: NAD, sedated.appears comfortable.    Respiratory: Nonlabored, no increased secretions.    Cardiovascular: Irregular, irregular.  GI: Soft, nontender.

## 2022-05-08 NOTE — PLAN OF CARE
19:00-07:30  Comfort cares. VS deferred. 4L oxymask. PRN ativan x1 and roxanol x2 given. Coley in place. Pt/family refusing repo.

## 2022-05-09 LAB — BACTERIA SNV CULT: NORMAL

## 2022-05-11 LAB
ATRIAL RATE - MUSE: 120 BPM
ATRIAL RATE - MUSE: 133 BPM
ATRIAL RATE - MUSE: 340 BPM
ATRIAL RATE - MUSE: 78 BPM
DIASTOLIC BLOOD PRESSURE - MUSE: NORMAL MMHG
INTERPRETATION ECG - MUSE: NORMAL
P AXIS - MUSE: 74 DEGREES
P AXIS - MUSE: NORMAL DEGREES
PR INTERVAL - MUSE: 216 MS
PR INTERVAL - MUSE: NORMAL MS
QRS DURATION - MUSE: 68 MS
QRS DURATION - MUSE: 72 MS
QRS DURATION - MUSE: 74 MS
QRS DURATION - MUSE: 84 MS
QT - MUSE: 240 MS
QT - MUSE: 302 MS
QT - MUSE: 312 MS
QT - MUSE: 382 MS
QTC - MUSE: 402 MS
QTC - MUSE: 412 MS
QTC - MUSE: 425 MS
QTC - MUSE: 435 MS
R AXIS - MUSE: 12 DEGREES
R AXIS - MUSE: 23 DEGREES
R AXIS - MUSE: 32 DEGREES
R AXIS - MUSE: 42 DEGREES
SYSTOLIC BLOOD PRESSURE - MUSE: NORMAL MMHG
T AXIS - MUSE: 215 DEGREES
T AXIS - MUSE: 39 DEGREES
T AXIS - MUSE: 61 DEGREES
T AXIS - MUSE: 68 DEGREES
VENTRICULAR RATE- MUSE: 100 BPM
VENTRICULAR RATE- MUSE: 112 BPM
VENTRICULAR RATE- MUSE: 189 BPM
VENTRICULAR RATE- MUSE: 78 BPM
